# Patient Record
Sex: FEMALE | Race: WHITE | Employment: OTHER | URBAN - NONMETROPOLITAN AREA
[De-identification: names, ages, dates, MRNs, and addresses within clinical notes are randomized per-mention and may not be internally consistent; named-entity substitution may affect disease eponyms.]

---

## 2018-01-17 ENCOUNTER — NURSE TRIAGE (OUTPATIENT)
Dept: ADMINISTRATIVE | Age: 83
End: 2018-01-17

## 2021-11-19 ENCOUNTER — OFFICE VISIT (OUTPATIENT)
Dept: SURGERY | Age: 86
End: 2021-11-19
Payer: MEDICARE

## 2021-11-19 ENCOUNTER — TELEPHONE (OUTPATIENT)
Dept: SURGERY | Age: 86
End: 2021-11-19

## 2021-11-19 VITALS
BODY MASS INDEX: 23.92 KG/M2 | WEIGHT: 130 LBS | HEIGHT: 62 IN | SYSTOLIC BLOOD PRESSURE: 127 MMHG | HEART RATE: 75 BPM | DIASTOLIC BLOOD PRESSURE: 59 MMHG | OXYGEN SATURATION: 99 %

## 2021-11-19 DIAGNOSIS — K62.3 RECTAL PROLAPSE: Primary | ICD-10-CM

## 2021-11-19 DIAGNOSIS — F03.90 DEMENTIA WITHOUT BEHAVIORAL DISTURBANCE, UNSPECIFIED DEMENTIA TYPE: ICD-10-CM

## 2021-11-19 PROCEDURE — 4040F PNEUMOC VAC/ADMIN/RCVD: CPT | Performed by: SURGERY

## 2021-11-19 PROCEDURE — G8484 FLU IMMUNIZE NO ADMIN: HCPCS | Performed by: SURGERY

## 2021-11-19 PROCEDURE — G8420 CALC BMI NORM PARAMETERS: HCPCS | Performed by: SURGERY

## 2021-11-19 PROCEDURE — 1036F TOBACCO NON-USER: CPT | Performed by: SURGERY

## 2021-11-19 PROCEDURE — G8427 DOCREV CUR MEDS BY ELIG CLIN: HCPCS | Performed by: SURGERY

## 2021-11-19 PROCEDURE — 1123F ACP DISCUSS/DSCN MKR DOCD: CPT | Performed by: SURGERY

## 2021-11-19 PROCEDURE — 1090F PRES/ABSN URINE INCON ASSESS: CPT | Performed by: SURGERY

## 2021-11-19 PROCEDURE — 99205 OFFICE O/P NEW HI 60 MIN: CPT | Performed by: SURGERY

## 2021-11-19 RX ORDER — SODIUM CHLORIDE 0.9 % (FLUSH) 0.9 %
5-40 SYRINGE (ML) INJECTION EVERY 12 HOURS SCHEDULED
Status: CANCELLED | OUTPATIENT
Start: 2021-11-19

## 2021-11-19 RX ORDER — AMLODIPINE BESYLATE 5 MG/1
5 TABLET ORAL NIGHTLY
COMMUNITY
Start: 2021-11-15

## 2021-11-19 RX ORDER — ATORVASTATIN CALCIUM 20 MG/1
20 TABLET, FILM COATED ORAL NIGHTLY
COMMUNITY
Start: 2021-11-15

## 2021-11-19 RX ORDER — ACETAMINOPHEN 325 MG/1
1000 TABLET ORAL ONCE
Status: CANCELLED | OUTPATIENT
Start: 2021-11-19 | End: 2021-11-19

## 2021-11-19 RX ORDER — TRIAMTERENE AND HYDROCHLOROTHIAZIDE 37.5; 25 MG/1; MG/1
1 TABLET ORAL DAILY
COMMUNITY
Start: 2021-11-15

## 2021-11-19 RX ORDER — SODIUM CHLORIDE 9 MG/ML
25 INJECTION, SOLUTION INTRAVENOUS PRN
Status: CANCELLED | OUTPATIENT
Start: 2021-11-19

## 2021-11-19 RX ORDER — SODIUM CHLORIDE 0.9 % (FLUSH) 0.9 %
5-40 SYRINGE (ML) INJECTION PRN
Status: CANCELLED | OUTPATIENT
Start: 2021-11-19

## 2021-11-19 NOTE — TELEPHONE ENCOUNTER
Spoke with patient's son Hany Marquez, they have placed a hold on time at [facility] for surgery in January. She does have to see her PCP first for clearance, this will decide if they are going to have the surgery or not. Time held. Hany Marquez will call us.

## 2021-11-19 NOTE — PATIENT INSTRUCTIONS
RECTAL PROLAPSE INFORMATION    Rectal prolapse is a condition in which the rectum (the last part of the large intestine) loses the normal attachments that keep it fixed inside the body, allowing it to slide or telescope out through the anal opening, turning it inside out.  Rectal prolapse affects mostly adults, but women ages 48 and older have six times the risk as men. It can be embarrassing and often has a negative effect on a patients quality of life. Although not always required, the most effective treatment for rectal prolapse is surgery. CAUSES    While a number of factors are thought to be linked to rectal prolapse, there is no clear cut cause.  An estimated 30% to 67% of patients have chronic constipation (infrequent stools or severe straining) and an additional 15% have diarrhea. In the past, this condition was assumed to be linked to giving birth multiple times by vaginal delivery. However, as many as 35% of patients with rectal prolapse never gave birth, and it can occur in men. Occasionally, rectal prolapse can be caused by a polyp or mass that serves as the \"lead point\" of the bowel protrusion. SYMPTOMS    A common question is whether hemorrhoids and rectal prolapse are the same. Bleeding and/or tissue that protrudes from the rectum are common symptoms of both, but there is a major difference. Rectal prolapse involves an entire segment of the bowel located higher up within the body. Hemorrhoids only involve the inner layer of the bowel near the anal opening. Rectal prolapse can lead to fecal incontinence (not being able to fully control gas or bowel movements), bleeding, mucus discharge, and discomfort. DIAGNOSIS    During the first visit, your colon and rectal surgeon will perform a thorough medical history and anorectal exam. In some cases, a rectal prolapse may be \"hidden\" or internal, making diagnosis more difficult.  You may be asked to sit on a toilet at your physicians office and strain as if having a bowel movement. Other tests used for diagnosis include:    Video defecogram: X-rays are taken while you are having a bowel movement to test muscle movement. Anorectal Manometry: Evaluates muscle functions and reflexes of the pelvis, rectum and anus used during bowel movements. Colonoscopy or sigmoidoscopy: In addition to being helpful to regularly screen for colon and rectal cancer and polyps, your surgeon will want to make sure you have had a recent colonoscopy (full colon exam) or sigmoidoscopy (limited exam) to insure that the inside of the prolapsing segment is healthy. TREATMENT    Constipation and straining play a major role in worsening and causing this condition, so this must be addressed and corrected to optimize results after surgery. However, it is unlikely that the prolapse will \"fix itself\", and most patients require surgical correction. STOOL REGIMEN for constipation:    1) Eat a healthy diet with lots of fruits and vegetables. Exercise and be active. 2) Use a fiber supplement twice daily (Metamucil, Benefiber, Konsyl, Citrucel, generic brand, etc) per package instructions. - Women should aim for 25 grams of fiber daily.   - Men should aim for 30-35 grams of fiber daily. 3) Drink 6-8 glasses of water per day. 4) MiraLax can be used as needed daily to help lubricate stool. 5) Colace stool softeners can be used as needed as well. Avoid Senna and sennakot laxative products, as they may damage the colon with long-term use. Stools should be soft, mushy but formed consistency, not diarrhea, without the need to strain. Only a few minutes should be spent on the commode. SURGICAL TREATMENT OF RECTAL PROLAPSE:    There are several methods used to surgically repair rectal prolapse. Your colon and rectal surgeon will make the decision what surgery to use based on your age, physical condition, extent of prolapse and the results of tests.  Options include removing part of the rectum or pulling the rectum back up and anchoring it. Surgical approaches include:    Abdominal repair through traditional surgery (open approach)  Laparoscopic surgery  Robotically assisted surgery   Transperineal approach (incision through the rectum itself from below - no skin scars)    POST-TREATMENT PROGNOSIS    For a large majority of patients, surgery relieves or greatly improves symptoms. Prolapse or some other condition may have weakened the anal sphincter muscles. However, these muscles have the potential to regain strength after the prolapse has been corrected. Factors that influence outcome include:    Condition of the anal sphincter muscles before surgery  Whether the prolapse is internal or external  Overall health of the patient    It may take as long as one year to determine the impact of surgery on bowel function. Chronic constipation and straining after surgical correction should be avoided to avoid recurrence. WHAT IS A COLON AND RECTAL SURGEON? Colon and rectal surgeons are experts in the surgical and non-surgical treatment of diseases of the colon, rectum, and anus. They have completed advanced surgical training in the treatment of these diseases, as well as full general surgical training. They are well versed in the treatment of both benign and malignant diseases of the colon, rectum, and anus and are able to perform routine screening examinations and surgically treat conditions, if indicated to do so.     If you have any questions, please call Dr Jakob Kaba office at: [phone]

## 2021-11-19 NOTE — PROGRESS NOTES
[address] [facility]  [address] [facility]. [address]  [address] [place] [id number]  Dept: [phone]  Dept Fax: [phone]: [phone]    Visit Date: 11/19/2021    Karrie Lane is a 80 y.o. female who presents today for: New Patient (Rectal Prolapse)      HPI:       Karrie Lane is a 80 y.o. female referred to me for further evaluation regarding full-thickness rectal prolapse. Kinga Vu is here today with her  and her son. She states she has been having mucus and incontinence for almost 6 months. She does have a history of colonoscopy in the past.  Has had a surgical history of cholecystectomy about 3 to 4 years ago. States she has trouble with sensation and does have incontinence. Intermittent liquid and constipation. Patient's problem list, medications, past medical, surgical, family, and social histories were reviewed and updated in the chart as indicated today. Past Medical History:   Diagnosis Date    Hyperlipidemia     Hypertension        Past Surgical History:   Procedure Laterality Date    CHOLECYSTECTOMY      HERNIA REPAIR      HYSTERECTOMY, TOTAL ABDOMINAL         Cancer-related family history is not on file. Social History:   Social History     Tobacco Use    Smoking status: Never Smoker    Smokeless tobacco: Never Used   Substance Use Topics    Alcohol use: Yes      Tobacco cessation counseling provided as appropriate. REVIEW OF SYSTEMS:    Pertinent positives and negatives are mentioned in the HPI above. Otherwise, all other systems were reviewed and negative. Objective:     Physical Exam   BP (!) 127/59   Pulse 75   Ht 5' 2\" (1.575 m)   Wt 130 lb (59 kg)   SpO2 99%   BMI 23.78 kg/m²   Constitutional: Appears well-developed and well-nourished. Grooming appropriate. No gross deformities. Body mass index is 23.78 kg/m². Eyes: No scleral icterus. Conjunctiva/lids normal. Vision intact grossly. Pupils equal/symmetric, reactive bilaterally. ENT: External ears/nose without defect, scars, or masses. Hearing grossly intact. No facial deformity. Lips normal, normal dentition. Neck: No masses. Trachea midline. No crepitus. Thyroid not enlarged. Cardiovascular: Normal rate. No peripheral edema. Abdominal aorta normal size to palpation. Pulmonary/Chest: Effort normal. No respiratory distress. No wheezes. No use of accessory muscles. Musculoskeletal: Normal range of motion x all 4 extremities and head/neck, without deformity, pain, or crepitus, with normal strength and tone. Normal gait. Nails without clubbing or cyanosis. Neurological: Alert and oriented to person, place, and time. No gross deficits. Sensation intact. Skin: Skin is dry. No rashes noted. No pallor. No induration of nodules. Psychiatric: Normal mood and affect. Behavior normal. Oriented to person, place, and time. Judgment and insight reasonable. Abdominal/wound: Soft, nontender, nondistended    Anorectal exam with chaperone in room. Patient placed in left lateral position. Buttock spread. Stool staining seen around perianal skin. Digital rectal exam shows no masses. Patient having some difficulty lying on her side, but with straining some beginnings of prolapse noted. Patient brought over to the commode and examined with chaperone in room and angled mirror aimed at the perineum. Even without Valsalva, patient noted to have about a 7 cm long prolapse full-thickness of the rectum.     Labs reviewed: None  Radiology reviewed: None    Last colonoscopy: Over 10 years ago      Assessment/Plan:     A/P:  New problem(s): Full-thickness rectal prolapse  Established problem(s): Advanced age  Additional workup/treatment planned: Transperineal proctosigmoidectomy  Risk of complications/morbidity: High    I had a long discussion with Chencho Jimenez and her  and son regarding the pathophysiology, etiology, natural history, treatment options including surgical options for full-thickness rectal prolapse. Given her advanced age and her limited functional status, I think a transperineal approach would be better suited. Discussed Altmeyer proctosigmoidectomy with coloanal anastomosis. Discussed recurrence rates with this approach. Discussed other risks, such as bleeding, infection, leak, recurrence of prolapse. Discussed that this will not fix her incontinence issues per se, but this can be later addressed after the prolapse has been taken care of. They would like to proceed with surgical intervention as this is severely affecting her day-to-day life and quality of life. We will schedule in the coming weeks. I had a discussion with the patient, , and son regarding the risks, benefits, and alternatives of the procedure, including, but not limited to: bleeding, infection, anastomotic leak, poor wound healing or cosmetic result, hernia, bowel obstruction, fistula, need for reoperation or additional procedures, temporary or permanent ostomy, damage to other structures, such as bowel, bladder, ureter, stomach, liver, and neurovascular structures. Need for, and risks of general anesthesia discussed. Postoperative typical bowel function, urinary function, and sexual function were discussed. The likelihood of open operation was discussed as well. Prep, preoperative testing, typical hospital stay, and perioperative expectations were addressed, as well as typical expectations regarding pain control and time away from work and daily activities. All questions were answered to patient's satisfaction. They understand that even in technically successfully operations and even in healthy patients, complications can occur, including possibility of death. I provided and encouraged patients and family members to review AVS (after visit summary) information that I have provided, that contains even more information regarding surgical risks and complications. They were encouraged to reach out to my office if they have any questions before or after surgery. Patient understands higher risk of perioperative morbidity and mortality given: advanced age, previous abd surgeries     Continue with current medications    I provided written information in the After Visit Summary AVS Regarding: rectal prolapse    DISPOSITION:  Plan surgery soon    My findings will be relayed to consulting practitioner or PCP via Epic    Note completed using dictation software, please excuse any errors.     Electronically signed by Smith Holland MD on 11/19/2021 at 11:50 AM

## 2021-12-30 ENCOUNTER — TELEPHONE (OUTPATIENT)
Dept: SURGERY | Age: 86
End: 2021-12-30

## 2022-02-09 ENCOUNTER — NURSE TRIAGE (OUTPATIENT)
Dept: OTHER | Facility: CLINIC | Age: 87
End: 2022-02-09

## 2022-02-09 NOTE — TELEPHONE ENCOUNTER
Subjective: Caller (son, [place] mother has rectal prolapse and they recommended surgery. With her age we decided not to do that. Both of my parents are very forgetful. I went up to get her purse and noticed blood on their carpet. Dad said it was an accident and it was poop. She wears depends. Dad helped her change her pad and it was a lot of blood. The blood in the toilet is bright red. \" Son states the patient states her \"rectum is hanging out\"    Current Symptoms: rectal bleeding    Onset: this morning    Associated Symptoms: denies other symptoms    Pain Severity: denies pain    Temperature: denies fever    What has been tried: none    LMP: NA Pregnant: No    Recommended disposition: see in office today    Care advice provided, patient verbalizes understanding; denies any other questions or concerns; instructed to call back for any new or worsening symptoms. Patient/caller to follow-up with PCP     This triage is a result of a call to [address] [place] [address]. Please do not respond to the triage nurse through this encounter. Any subsequent communication should be directly with the patient.     Reason for Disposition   MODERATE rectal bleeding (small blood clots, passing blood without stool, or toilet water turns red)    Protocols used: RECTAL BLEEDING-ADULT-[place]

## 2022-02-11 ENCOUNTER — TELEPHONE (OUTPATIENT)
Dept: SURGERY | Age: 87
End: 2022-02-11

## 2022-02-11 ENCOUNTER — PREP FOR PROCEDURE (OUTPATIENT)
Dept: SURGERY | Age: 87
End: 2022-02-11

## 2022-02-11 RX ORDER — SODIUM CHLORIDE 0.9 % (FLUSH) 0.9 %
5-40 SYRINGE (ML) INJECTION PRN
Status: CANCELLED | OUTPATIENT
Start: 2022-02-11

## 2022-02-11 RX ORDER — ACETAMINOPHEN 325 MG/1
1000 TABLET ORAL ONCE
Status: CANCELLED | OUTPATIENT
Start: 2022-02-11 | End: 2022-02-11

## 2022-02-11 RX ORDER — NEOMYCIN SULFATE 500 MG/1
TABLET ORAL
Qty: 6 TABLET | Refills: 0 | Status: ON HOLD
Start: 2022-02-11 | End: 2022-02-20 | Stop reason: HOSPADM

## 2022-02-11 RX ORDER — METRONIDAZOLE 500 MG/1
TABLET ORAL
Qty: 3 TABLET | Refills: 0 | Status: ON HOLD
Start: 2022-02-11 | End: 2022-02-20 | Stop reason: HOSPADM

## 2022-02-11 RX ORDER — SODIUM CHLORIDE 9 MG/ML
25 INJECTION, SOLUTION INTRAVENOUS PRN
Status: CANCELLED | OUTPATIENT
Start: 2022-02-11

## 2022-02-11 RX ORDER — SODIUM CHLORIDE 0.9 % (FLUSH) 0.9 %
5-40 SYRINGE (ML) INJECTION EVERY 12 HOURS SCHEDULED
Status: CANCELLED | OUTPATIENT
Start: 2022-02-11

## 2022-02-11 NOTE — TELEPHONE ENCOUNTER
Patient has been scheduled for:    Procedure: carmelina Siddiqi sig  Date: 2/18/22  Time: 1:00pm  Arrival: 11:00AM  Hospital: [facility]    Covid: Vaccinated (will test on 2/12/22  ASA?: N/A  Prep? #2 reviewed with son, emailed, abx sent    Pre-op? PCP    Post-op Appt? [facility] 3/10/22 at 9:30AM    Orders faxed to surgery scheduling. Instructions have been emailed to:  [email or web address]

## 2022-02-11 NOTE — TELEPHONE ENCOUNTER
Pt's son, Ana Cristina Yap, called to inform pt is having trouble with her rectal prolapse. She is also having bright red bleeding. She went to the ER Wednesday. Blood count was down to 11.7 from 12.1 on Monday. Pt was prescribed hydrocortisone cream 2.5%. He is not sure what to do right now. Please call Ana Cristina Yap, [phone].

## 2022-02-11 NOTE — TELEPHONE ENCOUNTER
Spoke to her son extensively. Discussed that the only way to permanently fix her prolapse is surgery. Hydrocortisone cream will do nothing. Offered to speak to the PCP to discuss the situation. Rosa Menendez will talk to his parents and let us know if they would like to proceed with surgery.

## 2022-02-16 RX ORDER — CALCIUM CARBONATE 500(1250)
400 TABLET ORAL 2 TIMES DAILY
COMMUNITY

## 2022-02-16 NOTE — PROGRESS NOTES
[facility] [facility] time____1125________        Surgery time____1325________    Take the following medications with a sip of water: Follow your MD/Surgeons pre-procedure instructions regarding your medications    Do not eat or drink anything after 12:00 midnight prior to your surgery. This includes water chewing gum, mints and ice chips. You may brush your teeth and gargle the morning of your surgery, but do not swallow the water     Please see your family doctor/pediatrician for a history and physical and/or concerning medications. Bring any test results/reports from your physicians office. If you are under the care of a heart doctor or specialist doctor, please be aware that you may be asked to them for clearance    You may be asked to stop blood thinners such as Coumadin, Plavix, Fragmin, Lovenox, etc., or any anti-inflammatories such as:  Aspirin, Ibuprofen, Advil, Naproxen prior to your surgery. We also ask that you stop any OTC medications such as fish oil, vitamin E, glucosamine, garlic, Multivitamins, COQ 10, etc.    We ask that you do not smoke 24 hours prior to surgery  We ask that you do not  drink any alcoholic beverages 24 hours prior to surgery     You must make arrangements for a responsible adult to take you home after your surgery. For your safety you will not be allowed to leave alone or drive yourself home. Your surgery will be cancelled if you do not have a ride home. Also for your safety, it is strongly suggested that someone stay with you the first 24 hours after your surgery. A parent or legal guardian must accompany a child scheduled for surgery and plan to stay at the hospital until the child is discharged. Please do not bring other children with you. For your comfort, please wear simple loose fitting clothing to the hospital.  Please do not bring valuables.     Do not wear any make-up or nail polish on your fingers or toes      For your safety, please do not wear any jewelry or body piercing's on the day of surgery. All jewelry must be removed. If you have dentures, they will be removed before going to operating room. For your convenience, we will provide you with a container. If you wear contact lenses or glasses, they will be removed, please bring a case for them. If you have a living will and a durable power of  for healthcare, please bring in a copy. As part of our patient safety program to minimize surgical site infections, we ask you to do the following:    · Please notify your surgeon if you develop any illness between         now and the  day of your surgery. · This includes a cough, cold, fever, sore throat, nausea,         or vomiting, and diarrhea, etc.  ·  Please notify your surgeon if you experience dizziness, shortness         of breath or blurred vision between now and the time of your surgery. Do not shave your operative site 96 hours prior to surgery. For face and neck surgery, men may use an electric razor 48 hours   prior to surgery. You may shower the night before surgery or the morning of   your surgery with an antibacterial soap. You will need to bring a photo ID and insurance card    [facility] has an onsite pharmacy, would you like to utilize our pharmacy     If you will be staying overnight and use a C-pap machine, please bring   your C-pap to hospital     Our goal is to provide you with excellent care, therefore, visitors will be limited to two(2) in the room at a time so that we may focus on providing this care for you. Please contact pre-admission testing if you have any further questions. [facility] phone number:  [phone] PAT fax number:  [phone]  Please note these are generalized instructions for all surgical cases, you may be provided with more specific instructions according to your surgery.

## 2022-02-16 NOTE — PROGRESS NOTES
C-Difficile admission screening and protocol:     * Admitted with diarrhea? YES____    NO____X_     *Prior history of C-Diff. In last 3 months? YES____   NO__X__     *Antibiotic use in the past 6-8 weeks? NO___X___YES______                 If yes which  ANTIBIOTIC AND REASON______     *Prior hospitalization or nursing home in the last month?  YES____   NO_X___

## 2022-02-16 NOTE — PROGRESS NOTES
Covid testing done on 2/15/22 @ [facility] in [place]  If positive---Pt instructed to notify MD ASAP   If negative--pt was instructed to bring results DOP/DOS

## 2022-02-17 ENCOUNTER — ANESTHESIA EVENT (OUTPATIENT)
Dept: OPERATING ROOM | Age: 87
DRG: 334 | End: 2022-02-17
Payer: MEDICARE

## 2022-02-17 ENCOUNTER — TELEPHONE (OUTPATIENT)
Dept: SURGERY | Age: 87
End: 2022-02-17

## 2022-02-17 NOTE — TELEPHONE ENCOUNTER
I have placed a reminder call to patient for upcoming procedure. Did you speak directly to patient or leave a voicemail? Spoke to son Cary Fernandes    Prep?      NPO 12AM  Prep #2    Will be admitted    Arrive at the main entrance of [facility] at 11:00AM

## 2022-02-18 ENCOUNTER — HOSPITAL ENCOUNTER (INPATIENT)
Age: 87
LOS: 3 days | Discharge: HOME HEALTH | DRG: 334 | End: 2022-02-21
Attending: SURGERY | Admitting: SURGERY
Payer: MEDICARE

## 2022-02-18 ENCOUNTER — ANESTHESIA (OUTPATIENT)
Dept: OPERATING ROOM | Age: 87
DRG: 334 | End: 2022-02-18
Payer: MEDICARE

## 2022-02-18 VITALS
SYSTOLIC BLOOD PRESSURE: 120 MMHG | TEMPERATURE: 96.1 F | RESPIRATION RATE: 9 BRPM | DIASTOLIC BLOOD PRESSURE: 60 MMHG | OXYGEN SATURATION: 98 %

## 2022-02-18 DIAGNOSIS — K62.3 RECTAL PROLAPSE: ICD-10-CM

## 2022-02-18 LAB
ABO/RH: NORMAL
ANION GAP SERPL CALCULATED.3IONS-SCNC: 19 MMOL/L (ref 3–16)
ANTIBODY SCREEN: NORMAL
BUN BLDV-MCNC: 18 MG/DL (ref 7–20)
CALCIUM SERPL-MCNC: 9.6 MG/DL (ref 8.3–10.6)
CHLORIDE BLD-SCNC: 95 MMOL/L (ref 99–110)
CO2: 20 MMOL/L (ref 21–32)
CREAT SERPL-MCNC: 0.8 MG/DL (ref 0.6–1.2)
GFR AFRICAN AMERICAN: >60
GFR NON-AFRICAN AMERICAN: >60
GLUCOSE BLD-MCNC: 111 MG/DL (ref 70–99)
HCT VFR BLD CALC: 38.9 % (ref 36–48)
HEMOGLOBIN: 13.1 G/DL (ref 12–16)
MAGNESIUM: 1.8 MG/DL (ref 1.8–2.4)
MCH RBC QN AUTO: 27.7 PG (ref 26–34)
MCHC RBC AUTO-ENTMCNC: 33.7 G/DL (ref 31–36)
MCV RBC AUTO: 82.3 FL (ref 80–100)
PDW BLD-RTO: 14.6 % (ref 12.4–15.4)
PLATELET # BLD: 262 K/UL (ref 135–450)
PMV BLD AUTO: 9.8 FL (ref 5–10.5)
POTASSIUM REFLEX MAGNESIUM: 3.3 MMOL/L (ref 3.5–5.1)
RBC # BLD: 4.72 M/UL (ref 4–5.2)
SODIUM BLD-SCNC: 134 MMOL/L (ref 136–145)
WBC # BLD: 8.4 K/UL (ref 4–11)

## 2022-02-18 PROCEDURE — 2500000003 HC RX 250 WO HCPCS: Performed by: ANESTHESIOLOGY

## 2022-02-18 PROCEDURE — 3700000000 HC ANESTHESIA ATTENDED CARE: Performed by: SURGERY

## 2022-02-18 PROCEDURE — 0DBN7ZZ EXCISION OF SIGMOID COLON, VIA NATURAL OR ARTIFICIAL OPENING: ICD-10-PCS | Performed by: SURGERY

## 2022-02-18 PROCEDURE — 83735 ASSAY OF MAGNESIUM: CPT

## 2022-02-18 PROCEDURE — 1200000000 HC SEMI PRIVATE

## 2022-02-18 PROCEDURE — A4217 STERILE WATER/SALINE, 500 ML: HCPCS | Performed by: SURGERY

## 2022-02-18 PROCEDURE — 6360000002 HC RX W HCPCS: Performed by: SURGERY

## 2022-02-18 PROCEDURE — 94761 N-INVAS EAR/PLS OXIMETRY MLT: CPT

## 2022-02-18 PROCEDURE — 2709999900 HC NON-CHARGEABLE SUPPLY: Performed by: SURGERY

## 2022-02-18 PROCEDURE — 2580000003 HC RX 258: Performed by: ANESTHESIOLOGY

## 2022-02-18 PROCEDURE — 2580000003 HC RX 258: Performed by: SURGERY

## 2022-02-18 PROCEDURE — 3700000001 HC ADD 15 MINUTES (ANESTHESIA): Performed by: SURGERY

## 2022-02-18 PROCEDURE — 6370000000 HC RX 637 (ALT 250 FOR IP): Performed by: SURGERY

## 2022-02-18 PROCEDURE — 7100000001 HC PACU RECOVERY - ADDTL 15 MIN: Performed by: SURGERY

## 2022-02-18 PROCEDURE — 0DJD7ZZ INSPECTION OF LOWER INTESTINAL TRACT, VIA NATURAL OR ARTIFICIAL OPENING: ICD-10-PCS | Performed by: SURGERY

## 2022-02-18 PROCEDURE — 2500000003 HC RX 250 WO HCPCS: Performed by: SURGERY

## 2022-02-18 PROCEDURE — 86900 BLOOD TYPING SEROLOGIC ABO: CPT

## 2022-02-18 PROCEDURE — 3600000014 HC SURGERY LEVEL 4 ADDTL 15MIN: Performed by: SURGERY

## 2022-02-18 PROCEDURE — 85027 COMPLETE CBC AUTOMATED: CPT

## 2022-02-18 PROCEDURE — 80048 BASIC METABOLIC PNL TOTAL CA: CPT

## 2022-02-18 PROCEDURE — 7100000000 HC PACU RECOVERY - FIRST 15 MIN: Performed by: SURGERY

## 2022-02-18 PROCEDURE — 2700000000 HC OXYGEN THERAPY PER DAY

## 2022-02-18 PROCEDURE — C1887 CATHETER, GUIDING: HCPCS | Performed by: SURGERY

## 2022-02-18 PROCEDURE — 0DTP7ZZ RESECTION OF RECTUM, VIA NATURAL OR ARTIFICIAL OPENING: ICD-10-PCS | Performed by: SURGERY

## 2022-02-18 PROCEDURE — 86901 BLOOD TYPING SEROLOGIC RH(D): CPT

## 2022-02-18 PROCEDURE — 86850 RBC ANTIBODY SCREEN: CPT

## 2022-02-18 PROCEDURE — 88307 TISSUE EXAM BY PATHOLOGIST: CPT

## 2022-02-18 PROCEDURE — 3600000004 HC SURGERY LEVEL 4 BASE: Performed by: SURGERY

## 2022-02-18 PROCEDURE — 45130 EXCISION OF RECTAL PROLAPSE: CPT | Performed by: SURGERY

## 2022-02-18 PROCEDURE — 6360000002 HC RX W HCPCS: Performed by: ANESTHESIOLOGY

## 2022-02-18 PROCEDURE — C9290 INJ, BUPIVACAINE LIPOSOME: HCPCS | Performed by: SURGERY

## 2022-02-18 RX ORDER — SODIUM CHLORIDE 9 MG/ML
25 INJECTION, SOLUTION INTRAVENOUS PRN
Status: DISCONTINUED | OUTPATIENT
Start: 2022-02-18 | End: 2022-02-18 | Stop reason: HOSPADM

## 2022-02-18 RX ORDER — ONDANSETRON 2 MG/ML
4 INJECTION INTRAMUSCULAR; INTRAVENOUS
Status: DISCONTINUED | OUTPATIENT
Start: 2022-02-18 | End: 2022-02-18 | Stop reason: HOSPADM

## 2022-02-18 RX ORDER — ONDANSETRON 2 MG/ML
INJECTION INTRAMUSCULAR; INTRAVENOUS PRN
Status: DISCONTINUED | OUTPATIENT
Start: 2022-02-18 | End: 2022-02-18 | Stop reason: SDUPTHER

## 2022-02-18 RX ORDER — POTASSIUM CHLORIDE 20 MEQ/1
40 TABLET, EXTENDED RELEASE ORAL PRN
Status: DISCONTINUED | OUTPATIENT
Start: 2022-02-18 | End: 2022-02-21 | Stop reason: HOSPADM

## 2022-02-18 RX ORDER — LEVOFLOXACIN 5 MG/ML
500 INJECTION, SOLUTION INTRAVENOUS
Status: DISCONTINUED | OUTPATIENT
Start: 2022-02-18 | End: 2022-02-18 | Stop reason: SDUPTHER

## 2022-02-18 RX ORDER — LIDOCAINE HYDROCHLORIDE 20 MG/ML
INJECTION, SOLUTION EPIDURAL; INFILTRATION; INTRACAUDAL; PERINEURAL PRN
Status: DISCONTINUED | OUTPATIENT
Start: 2022-02-18 | End: 2022-02-18 | Stop reason: SDUPTHER

## 2022-02-18 RX ORDER — TRIAMTERENE AND HYDROCHLOROTHIAZIDE 37.5; 25 MG/1; MG/1
1 TABLET ORAL DAILY
Status: DISCONTINUED | OUTPATIENT
Start: 2022-02-19 | End: 2022-02-21 | Stop reason: HOSPADM

## 2022-02-18 RX ORDER — ACETAMINOPHEN 500 MG
1000 TABLET ORAL ONCE
Status: COMPLETED | OUTPATIENT
Start: 2022-02-18 | End: 2022-02-18

## 2022-02-18 RX ORDER — FENTANYL CITRATE 50 UG/ML
25 INJECTION, SOLUTION INTRAMUSCULAR; INTRAVENOUS EVERY 5 MIN PRN
Status: DISCONTINUED | OUTPATIENT
Start: 2022-02-18 | End: 2022-02-18 | Stop reason: HOSPADM

## 2022-02-18 RX ORDER — SODIUM CHLORIDE 0.9 % (FLUSH) 0.9 %
10 SYRINGE (ML) INJECTION EVERY 12 HOURS SCHEDULED
Status: DISCONTINUED | OUTPATIENT
Start: 2022-02-18 | End: 2022-02-18 | Stop reason: HOSPADM

## 2022-02-18 RX ORDER — ONDANSETRON 4 MG/1
4 TABLET, ORALLY DISINTEGRATING ORAL EVERY 8 HOURS PRN
Status: DISCONTINUED | OUTPATIENT
Start: 2022-02-18 | End: 2022-02-21 | Stop reason: HOSPADM

## 2022-02-18 RX ORDER — SODIUM CHLORIDE 0.9 % (FLUSH) 0.9 %
5-40 SYRINGE (ML) INJECTION EVERY 12 HOURS SCHEDULED
Status: DISCONTINUED | OUTPATIENT
Start: 2022-02-18 | End: 2022-02-18 | Stop reason: SDUPTHER

## 2022-02-18 RX ORDER — ATORVASTATIN CALCIUM 20 MG/1
20 TABLET, FILM COATED ORAL NIGHTLY
Status: DISCONTINUED | OUTPATIENT
Start: 2022-02-18 | End: 2022-02-21 | Stop reason: HOSPADM

## 2022-02-18 RX ORDER — DEXTROSE AND SODIUM CHLORIDE 5; .45 G/100ML; G/100ML
INJECTION, SOLUTION INTRAVENOUS CONTINUOUS
Status: DISCONTINUED | OUTPATIENT
Start: 2022-02-18 | End: 2022-02-21 | Stop reason: HOSPADM

## 2022-02-18 RX ORDER — EPHEDRINE SULFATE/0.9% NACL/PF 50 MG/5 ML
SYRINGE (ML) INTRAVENOUS PRN
Status: DISCONTINUED | OUTPATIENT
Start: 2022-02-18 | End: 2022-02-18 | Stop reason: SDUPTHER

## 2022-02-18 RX ORDER — SODIUM CHLORIDE 9 MG/ML
25 INJECTION, SOLUTION INTRAVENOUS PRN
Status: DISCONTINUED | OUTPATIENT
Start: 2022-02-18 | End: 2022-02-21 | Stop reason: HOSPADM

## 2022-02-18 RX ORDER — OXYCODONE HYDROCHLORIDE 5 MG/1
5 TABLET ORAL
Status: DISCONTINUED | OUTPATIENT
Start: 2022-02-18 | End: 2022-02-18 | Stop reason: HOSPADM

## 2022-02-18 RX ORDER — ULTRASOUND COUPLING MEDIUM
GEL (GRAM) TOPICAL
Status: COMPLETED | OUTPATIENT
Start: 2022-02-18 | End: 2022-02-18

## 2022-02-18 RX ORDER — SODIUM CHLORIDE 0.9 % (FLUSH) 0.9 %
10 SYRINGE (ML) INJECTION EVERY 12 HOURS SCHEDULED
Status: DISCONTINUED | OUTPATIENT
Start: 2022-02-18 | End: 2022-02-21 | Stop reason: HOSPADM

## 2022-02-18 RX ORDER — AMLODIPINE BESYLATE 5 MG/1
5 TABLET ORAL NIGHTLY
Status: DISCONTINUED | OUTPATIENT
Start: 2022-02-18 | End: 2022-02-21 | Stop reason: HOSPADM

## 2022-02-18 RX ORDER — ACETAMINOPHEN 325 MG/1
650 TABLET ORAL ONCE
Status: DISCONTINUED | OUTPATIENT
Start: 2022-02-18 | End: 2022-02-18 | Stop reason: SDUPTHER

## 2022-02-18 RX ORDER — DROPERIDOL 2.5 MG/ML
0.62 INJECTION, SOLUTION INTRAMUSCULAR; INTRAVENOUS
Status: DISCONTINUED | OUTPATIENT
Start: 2022-02-18 | End: 2022-02-18 | Stop reason: HOSPADM

## 2022-02-18 RX ORDER — SODIUM CHLORIDE 0.9 % (FLUSH) 0.9 %
5-40 SYRINGE (ML) INJECTION EVERY 12 HOURS SCHEDULED
Status: DISCONTINUED | OUTPATIENT
Start: 2022-02-18 | End: 2022-02-18 | Stop reason: HOSPADM

## 2022-02-18 RX ORDER — SODIUM CHLORIDE 0.9 % (FLUSH) 0.9 %
10 SYRINGE (ML) INJECTION PRN
Status: DISCONTINUED | OUTPATIENT
Start: 2022-02-18 | End: 2022-02-18 | Stop reason: HOSPADM

## 2022-02-18 RX ORDER — ACETAMINOPHEN 325 MG/1
325 TABLET ORAL EVERY 6 HOURS PRN
Status: DISCONTINUED | OUTPATIENT
Start: 2022-02-18 | End: 2022-02-21 | Stop reason: HOSPADM

## 2022-02-18 RX ORDER — MAGNESIUM HYDROXIDE 1200 MG/15ML
LIQUID ORAL CONTINUOUS PRN
Status: COMPLETED | OUTPATIENT
Start: 2022-02-18 | End: 2022-02-18

## 2022-02-18 RX ORDER — SODIUM CHLORIDE 0.9 % (FLUSH) 0.9 %
5-40 SYRINGE (ML) INJECTION PRN
Status: DISCONTINUED | OUTPATIENT
Start: 2022-02-18 | End: 2022-02-18 | Stop reason: SDUPTHER

## 2022-02-18 RX ORDER — ACETAMINOPHEN 500 MG
1000 TABLET ORAL ONCE
Status: DISCONTINUED | OUTPATIENT
Start: 2022-02-18 | End: 2022-02-18 | Stop reason: SDUPTHER

## 2022-02-18 RX ORDER — SODIUM CHLORIDE 0.9 % (FLUSH) 0.9 %
10 SYRINGE (ML) INJECTION PRN
Status: DISCONTINUED | OUTPATIENT
Start: 2022-02-18 | End: 2022-02-21 | Stop reason: HOSPADM

## 2022-02-18 RX ORDER — FENTANYL CITRATE 50 UG/ML
INJECTION, SOLUTION INTRAMUSCULAR; INTRAVENOUS PRN
Status: DISCONTINUED | OUTPATIENT
Start: 2022-02-18 | End: 2022-02-18 | Stop reason: SDUPTHER

## 2022-02-18 RX ORDER — MEPERIDINE HYDROCHLORIDE 25 MG/ML
12.5 INJECTION INTRAMUSCULAR; INTRAVENOUS; SUBCUTANEOUS EVERY 5 MIN PRN
Status: DISCONTINUED | OUTPATIENT
Start: 2022-02-18 | End: 2022-02-18 | Stop reason: HOSPADM

## 2022-02-18 RX ORDER — LEVOFLOXACIN 5 MG/ML
500 INJECTION, SOLUTION INTRAVENOUS
Status: COMPLETED | OUTPATIENT
Start: 2022-02-18 | End: 2022-02-18

## 2022-02-18 RX ORDER — PROPOFOL 10 MG/ML
INJECTION, EMULSION INTRAVENOUS PRN
Status: DISCONTINUED | OUTPATIENT
Start: 2022-02-18 | End: 2022-02-18 | Stop reason: SDUPTHER

## 2022-02-18 RX ORDER — ONDANSETRON 2 MG/ML
4 INJECTION INTRAMUSCULAR; INTRAVENOUS EVERY 6 HOURS PRN
Status: DISCONTINUED | OUTPATIENT
Start: 2022-02-18 | End: 2022-02-21 | Stop reason: HOSPADM

## 2022-02-18 RX ORDER — SODIUM CHLORIDE 9 MG/ML
INJECTION, SOLUTION INTRAVENOUS CONTINUOUS PRN
Status: DISCONTINUED | OUTPATIENT
Start: 2022-02-18 | End: 2022-02-18 | Stop reason: SDUPTHER

## 2022-02-18 RX ORDER — POTASSIUM CHLORIDE 7.45 MG/ML
10 INJECTION INTRAVENOUS PRN
Status: DISCONTINUED | OUTPATIENT
Start: 2022-02-18 | End: 2022-02-21 | Stop reason: HOSPADM

## 2022-02-18 RX ORDER — ROCURONIUM BROMIDE 10 MG/ML
INJECTION, SOLUTION INTRAVENOUS PRN
Status: DISCONTINUED | OUTPATIENT
Start: 2022-02-18 | End: 2022-02-18 | Stop reason: SDUPTHER

## 2022-02-18 RX ORDER — FENTANYL CITRATE 50 UG/ML
50 INJECTION, SOLUTION INTRAMUSCULAR; INTRAVENOUS EVERY 5 MIN PRN
Status: DISCONTINUED | OUTPATIENT
Start: 2022-02-18 | End: 2022-02-18 | Stop reason: HOSPADM

## 2022-02-18 RX ORDER — SODIUM CHLORIDE 9 MG/ML
25 INJECTION, SOLUTION INTRAVENOUS PRN
Status: DISCONTINUED | OUTPATIENT
Start: 2022-02-18 | End: 2022-02-18 | Stop reason: SDUPTHER

## 2022-02-18 RX ORDER — LIDOCAINE 4 G/G
1 PATCH TOPICAL EVERY 12 HOURS
Status: DISCONTINUED | OUTPATIENT
Start: 2022-02-18 | End: 2022-02-21 | Stop reason: HOSPADM

## 2022-02-18 RX ORDER — OXYCODONE HYDROCHLORIDE 5 MG/1
5 TABLET ORAL EVERY 6 HOURS PRN
Status: DISCONTINUED | OUTPATIENT
Start: 2022-02-18 | End: 2022-02-21 | Stop reason: HOSPADM

## 2022-02-18 RX ORDER — DEXAMETHASONE SODIUM PHOSPHATE 4 MG/ML
INJECTION, SOLUTION INTRA-ARTICULAR; INTRALESIONAL; INTRAMUSCULAR; INTRAVENOUS; SOFT TISSUE PRN
Status: DISCONTINUED | OUTPATIENT
Start: 2022-02-18 | End: 2022-02-18 | Stop reason: SDUPTHER

## 2022-02-18 RX ORDER — MAGNESIUM SULFATE 1 G/100ML
1000 INJECTION INTRAVENOUS PRN
Status: DISCONTINUED | OUTPATIENT
Start: 2022-02-18 | End: 2022-02-21 | Stop reason: HOSPADM

## 2022-02-18 RX ORDER — SODIUM CHLORIDE 0.9 % (FLUSH) 0.9 %
5-40 SYRINGE (ML) INJECTION PRN
Status: DISCONTINUED | OUTPATIENT
Start: 2022-02-18 | End: 2022-02-18 | Stop reason: HOSPADM

## 2022-02-18 RX ORDER — SODIUM CHLORIDE 9 MG/ML
INJECTION, SOLUTION INTRAVENOUS CONTINUOUS
Status: DISCONTINUED | OUTPATIENT
Start: 2022-02-18 | End: 2022-02-18

## 2022-02-18 RX ADMIN — ATORVASTATIN CALCIUM 20 MG: 20 TABLET, FILM COATED ORAL at 20:11

## 2022-02-18 RX ADMIN — METRONIDAZOLE 500 MG: 500 INJECTION, SOLUTION INTRAVENOUS at 13:16

## 2022-02-18 RX ADMIN — LIDOCAINE HYDROCHLORIDE 40 MG: 20 INJECTION, SOLUTION EPIDURAL; INFILTRATION; INTRACAUDAL; PERINEURAL at 13:59

## 2022-02-18 RX ADMIN — SUGAMMADEX 200 MG: 100 INJECTION, SOLUTION INTRAVENOUS at 14:40

## 2022-02-18 RX ADMIN — DEXTROSE AND SODIUM CHLORIDE: 5; 450 INJECTION, SOLUTION INTRAVENOUS at 18:48

## 2022-02-18 RX ADMIN — DEXAMETHASONE SODIUM PHOSPHATE 8 MG: 4 INJECTION, SOLUTION INTRAMUSCULAR; INTRAVENOUS at 13:23

## 2022-02-18 RX ADMIN — LIDOCAINE HYDROCHLORIDE 40 MG: 20 INJECTION, SOLUTION EPIDURAL; INFILTRATION; INTRACAUDAL; PERINEURAL at 13:23

## 2022-02-18 RX ADMIN — FENTANYL CITRATE 50 MCG: 50 INJECTION INTRAMUSCULAR; INTRAVENOUS at 13:47

## 2022-02-18 RX ADMIN — SODIUM CHLORIDE: 9 INJECTION, SOLUTION INTRAVENOUS at 12:08

## 2022-02-18 RX ADMIN — ROCURONIUM BROMIDE 50 MG: 10 SOLUTION INTRAVENOUS at 13:25

## 2022-02-18 RX ADMIN — ROCURONIUM BROMIDE 10 MG: 10 SOLUTION INTRAVENOUS at 14:12

## 2022-02-18 RX ADMIN — PROPOFOL 50 MG: 10 INJECTION, EMULSION INTRAVENOUS at 13:23

## 2022-02-18 RX ADMIN — Medication 10 MG: at 13:35

## 2022-02-18 RX ADMIN — SODIUM CHLORIDE: 9 INJECTION, SOLUTION INTRAVENOUS at 13:16

## 2022-02-18 RX ADMIN — ENOXAPARIN SODIUM 30 MG: 30 INJECTION SUBCUTANEOUS at 12:17

## 2022-02-18 RX ADMIN — METRONIDAZOLE 500 MG: 500 INJECTION, SOLUTION INTRAVENOUS at 12:15

## 2022-02-18 RX ADMIN — LEVOFLOXACIN 500 MG: 5 INJECTION, SOLUTION INTRAVENOUS at 13:25

## 2022-02-18 RX ADMIN — FENTANYL CITRATE 50 MCG: 50 INJECTION INTRAMUSCULAR; INTRAVENOUS at 14:19

## 2022-02-18 RX ADMIN — ONDANSETRON 4 MG: 2 INJECTION INTRAMUSCULAR; INTRAVENOUS at 14:26

## 2022-02-18 RX ADMIN — ACETAMINOPHEN 1000 MG: 500 TABLET ORAL at 12:13

## 2022-02-18 ASSESSMENT — PULMONARY FUNCTION TESTS
PIF_VALUE: 12
PIF_VALUE: 1
PIF_VALUE: 12
PIF_VALUE: 7
PIF_VALUE: 12
PIF_VALUE: 11
PIF_VALUE: 12
PIF_VALUE: 14
PIF_VALUE: 12
PIF_VALUE: 1
PIF_VALUE: 1
PIF_VALUE: 12
PIF_VALUE: 6
PIF_VALUE: 12
PIF_VALUE: 3
PIF_VALUE: 12
PIF_VALUE: 15
PIF_VALUE: 11
PIF_VALUE: 12
PIF_VALUE: 1
PIF_VALUE: 12
PIF_VALUE: 5
PIF_VALUE: 12
PIF_VALUE: 3
PIF_VALUE: 12
PIF_VALUE: 4
PIF_VALUE: 12
PIF_VALUE: 11
PIF_VALUE: 4
PIF_VALUE: 12
PIF_VALUE: 16
PIF_VALUE: 12
PIF_VALUE: 3
PIF_VALUE: 12
PIF_VALUE: 23
PIF_VALUE: 12
PIF_VALUE: 4
PIF_VALUE: 12
PIF_VALUE: 14
PIF_VALUE: 12
PIF_VALUE: 12
PIF_VALUE: 0
PIF_VALUE: 1
PIF_VALUE: 12
PIF_VALUE: 2

## 2022-02-18 ASSESSMENT — PAIN SCALES - GENERAL
PAINLEVEL_OUTOF10: 0

## 2022-02-18 ASSESSMENT — PAIN - FUNCTIONAL ASSESSMENT: PAIN_FUNCTIONAL_ASSESSMENT: 0-10

## 2022-02-18 ASSESSMENT — ENCOUNTER SYMPTOMS: SHORTNESS OF BREATH: 0

## 2022-02-18 NOTE — ANESTHESIA PRE PROCEDURE
Department of Anesthesiology  Preprocedure Note       Name:  Una Lebron   Age:  80 y.o.  :  1933                                          MRN:  [id number]         Date:  2022      Surgeon: Otoniel Israel):  Renato Flores MD    Procedure: Procedure(s):  TRANSPERINEAL RECTOSIGMOIDECTOMY (ALTEMEIER PROCEDURE), FLEXIBLE SIGMOIDOSCOPY    Medications prior to admission:   Prior to Admission medications    Medication Sig Start Date End Date Taking? Authorizing Provider   calcium carbonate (OSCAL) 500 MG TABS tablet Take 400 mg by mouth 2 times daily   Yes Historical Provider, MD   atorvastatin (LIPITOR) 20 MG tablet Take 20 mg by mouth at bedtime  11/15/21  Yes Historical Provider, MD   triamterene-hydroCHLOROthiazide (MAXZIDE-25) 37.5-25 MG per tablet Take 1 tablet by mouth daily  11/15/21  Yes Historical Provider, MD   amLODIPine (NORVASC) 5 MG tablet Take 5 mg by mouth nightly  11/15/21  Yes Historical Provider, MD   metroNIDAZOLE (FLAGYL) 500 MG tablet Take one tablet by mouth 3 times on the day prior to surgery. Take one tablet at 1pm, 2pm and 9pm. 22   Renato Flores MD   neomycin (MYCIFRADIN) 500 MG tablet Take two tablets 3 times the day before surgery.  Take two tablets at 1pm, two tablets at 2pm and two tablets at 9pm. 22   Renato Flores MD       Current medications:    Current Facility-Administered Medications   Medication Dose Route Frequency Provider Last Rate Last Admin    0.9 % sodium chloride infusion   IntraVENous Continuous Lucia Ortega MD        sodium chloride flush 0.9 % injection 10 mL  10 mL IntraVENous 2 times per day Lucia Ortega MD        sodium chloride flush 0.9 % injection 10 mL  10 mL IntraVENous PRN Lucia Ortega MD        0.9 % sodium chloride infusion  25 mL IntraVENous PRN Lucia Ortega MD        acetaminophen (TYLENOL) tablet 1,000 mg  1,000 mg Oral Once Renato Flores MD        enoxaparin (LOVENOX) injection 30 mg  30 mg SubCUTAneous Once Renato Flores MD  metronidazole (FLAGYL) 500 mg in NaCl 100 mL IVPB premix  500 mg IntraVENous On Call to Via Trever Lerma MD        And    levoFLOXacin (LEVAQUIN) 500 MG/100ML infusion 500 mg  500 mg IntraVENous On Call to Via Trever Lerma MD           Allergies:  No Known Allergies    Problem List:  There is no problem list on file for this patient. Past Medical History:        Diagnosis Date    Arthritis     bilateral knee    Forgetfulness     Hx of blood clots 2020    DVT    Hyperlipidemia     Hypertension     Wears glasses     reading       Past Surgical History:        Procedure Laterality Date    CHOLECYSTECTOMY      HERNIA REPAIR      hiatal hernia    HYSTERECTOMY, TOTAL ABDOMINAL      complete       Social History:    Social History     Tobacco Use    Smoking status: Never Smoker    Smokeless tobacco: Never Used   Substance Use Topics    Alcohol use: Yes     Comment: socially--wine                                Counseling given: Not Answered      Vital Signs (Current):   Vitals:    02/16/22 1252   Weight: 130 lb (59 kg)   Height: 5' (1.524 m)                                              BP Readings from Last 3 Encounters:   11/19/21 (!) 127/59       NPO Status:                                                                                 BMI:   Wt Readings from Last 3 Encounters:   02/16/22 130 lb (59 kg)   11/19/21 130 lb (59 kg)     Body mass index is 25.39 kg/m². CBC: No results found for: WBC, RBC, HGB, HCT, MCV, RDW, PLT    CMP: No results found for: NA, K, CL, CO2, BUN, CREATININE, GFRAA, AGRATIO, LABGLOM, GLUCOSE, GLU, PROT, CALCIUM, BILITOT, ALKPHOS, AST, ALT    POC Tests: No results for input(s): POCGLU, POCNA, POCK, POCCL, POCBUN, POCHEMO, POCHCT in the last 72 hours.     Coags: No results found for: PROTIME, INR, APTT    HCG (If Applicable): No results found for: PREGTESTUR, PREGSERUM, HCG, HCGQUANT     ABGs: No results found for: PHART, PO2ART, MPK4WFR, GKH8OFQ, BEART, X8GVLCJJ Type & Screen (If Applicable):  No results found for: LABABO, LABRH    Drug/Infectious Status (If Applicable):  No results found for: HIV, HEPCAB    COVID-19 Screening (If Applicable): No results found for: COVID19        Anesthesia Evaluation  Patient summary reviewed no history of anesthetic complications:   Airway: Mallampati: II  TM distance: >3 FB   Neck ROM: full  Comment: Prior airway in 2017:  :Standard; ETT Size:7 mm; Cuffed:Cuffed; Direct Laryngoscopy attempts:1; Laryngoscope type:Jerry; Laryngoscope size:2; Grade view:II; Rapid sequence:No; Cricoid pressure applied:No; Procedure status:No trauma, Dentition as pre-op  Mouth opening: > = 3 FB Dental:          Pulmonary: breath sounds clear to auscultation      (-) COPD, asthma, shortness of breath and recent URI                           Cardiovascular:  Exercise tolerance: no interval change,   (+) hypertension:,     (-) pacemaker, past MI, CAD, CABG/stent,  angina,  CHF, orthopnea, PND and  GOMEZ      Rhythm: regular  Rate: normal                 ROS comment: Prior TTE 2017: The left ventricle is small in size. There is mild concentric left ventricular hypertrophy. Left ventricular systolic function is normal. (LVEF>/=55%)  Overall left ventricular ejection fraction is estimated to be 55-60%. Septal motion is consistent with conduction abnormality. The diastolic function is impaired and classified as Grade 1  (impaired relaxation). Trace pericardial effusion. Neuro/Psych:      (-) seizures, TIA and CVA            ROS comment: Short term memory loss, possible POCD GI/Hepatic/Renal:        (-) liver disease and no renal disease       Endo/Other: Negative Endo/Other ROS                    Abdominal:             Vascular:     - PVD, DVT and PE. Other Findings:           Anesthesia Plan      general     ASA 3     (Standard ASA monitors)  Induction: intravenous.     MIPS: Postoperative opioids intended and Prophylactic antiemetics administered. Anesthetic plan and risks discussed with patient.                       Kody Goncalves MD   2/18/2022

## 2022-02-18 NOTE — BRIEF OP NOTE
Brief Postoperative Note      Patient: Fatmata Hough  YOB: 1933  MRN: [id number]    Date of Procedure: 2/18/2022    Pre-Op Diagnosis: RECTAL PROLAPSE    Post-Op Diagnosis: Same       Procedure(s):  TRANSPERINEAL RECTOSIGMOIDECTOMY (ALTEMEIER PROCEDURE), FLEXIBLE SIGMOIDOSCOPY    Surgeon(s):  Lasha Valdez MD    Assistant:  Surgical Assistant: Corrie Young    Anesthesia: General    Estimated Blood Loss (mL): less than 50     Complications: None    Specimens:   ID Type Source Tests Collected by Time Destination   A : A.  Rectosigmoidectomy Tissue Tissue SURGICAL PATHOLOGY Lasha Valdez MD 2/18/2022 1414        Implants:  * No implants in log *      Drains:   Urethral Catheter Double-lumen;Non-latex 16 fr (Active)       Findings: 8 inches of rectosigmoid prolapse    Electronically signed by Lasha Valdez MD on 2/18/2022 at 2:54 PM

## 2022-02-18 NOTE — H&P
PRE-OP/PRE-PROCEDURE H&P    Visit Date: 2/18/2022    History:     Luis Angel Pereira is a 80 y.o. female who presents today for procedure. See my/PCP/oncologist office notes for indications and details. There is no problem list on file for this patient. Current Facility-Administered Medications:     0.9 % sodium chloride infusion, , IntraVENous, Continuous, Shiva Shukla MD, Last Rate: 125 mL/hr at 02/18/22 1208, New Bag at 02/18/22 1208    sodium chloride flush 0.9 % injection 10 mL, 10 mL, IntraVENous, 2 times per day, Shiva Shukla MD    sodium chloride flush 0.9 % injection 10 mL, 10 mL, IntraVENous, PRN, Shiva Shukla MD    0.9 % sodium chloride infusion, 25 mL, IntraVENous, PRN, Shiva Shukla MD    metronidazole (FLAGYL) 500 mg in NaCl 100 mL IVPB premix, 500 mg, IntraVENous, On Call to OR, Last Rate: 100 mL/hr at 02/18/22 1215, 500 mg at 02/18/22 1215 **AND** levoFLOXacin (LEVAQUIN) 500 MG/100ML infusion 500 mg, 500 mg, IntraVENous, On Call to OR, Latasha Chappell MD  Prior to Admission medications    Medication Sig Start Date End Date Taking? Authorizing Provider   calcium carbonate (OSCAL) 500 MG TABS tablet Take 400 mg by mouth 2 times daily   Yes Historical Provider, MD   metroNIDAZOLE (FLAGYL) 500 MG tablet Take one tablet by mouth 3 times on the day prior to surgery. Take one tablet at 1pm, 2pm and 9pm. 2/11/22  Yes Latasha Chappell MD   neomycin (MYCIFRADIN) 500 MG tablet Take two tablets 3 times the day before surgery.  Take two tablets at 1pm, two tablets at 2pm and two tablets at 9pm. 2/11/22  Yes Latasha Chappell MD   atorvastatin (LIPITOR) 20 MG tablet Take 20 mg by mouth at bedtime  11/15/21  Yes Historical Provider, MD   triamterene-hydroCHLOROthiazide (MAXZIDE-25) 37.5-25 MG per tablet Take 1 tablet by mouth daily  11/15/21  Yes Historical Provider, MD   amLODIPine (NORVASC) 5 MG tablet Take 5 mg by mouth nightly  11/15/21  Yes Historical Provider, MD     No Known Allergies  Past Medical History:   Diagnosis Date    Arthritis     bilateral knee    Forgetfulness     Hx of blood clots 2020    DVT    Hyperlipidemia     Hypertension     Wears glasses     reading     Past Surgical History:   Procedure Laterality Date    CHOLECYSTECTOMY      HERNIA REPAIR      hiatal hernia    HYSTERECTOMY, TOTAL ABDOMINAL      complete         Physical Exam:     BP (!) 133/59   Pulse 74   Temp 97.3 °F (36.3 °C) (Temporal)   Resp 16   Ht 5' (1.524 m)   Wt 130 lb (59 kg)   SpO2 98%   BMI 25.39 kg/m²  Body mass index is 25.39 kg/m². Constitutional: Appears well-developed and well-nourished. Head: Normocephalic, atraumatic. Eyes: No scleral icterus. Vision intact grossly. ENT: Hearing grossly intact. No facial deformity. Neck: Normal range of motion. No tracheal deviation. Cardiovascular: Normal rate. No peripheral edema. Pulmonary/Chest: Effort normal. No respiratory distress. No wheezes. No use of accessory muscles. Musculoskeletal: No gross deformity. Neurological: Alert and oriented to person, place, and time. No gross deficits. Skin: Skin is dry. No rash noted. No pallor. Psychiatric: Normal mood and affect. Behavior normal. Oriented to person, place, and time. Abdomen: soft, NTTP, non distended    Recent labs and imaging reviewed as necessary. Assessment/Plan:       Proceed as planned for Altemeier procedure    Risks/benefits/alternatives of procedure/surgery discussed with patient and any present family members (or appropriate guardian) and understanding verbalized. All questions answered. Patient wishes to proceed.     Electronically signed by Los Ho MD on 2/18/2022 at 12:22 PM

## 2022-02-19 LAB
ANION GAP SERPL CALCULATED.3IONS-SCNC: 14 MMOL/L (ref 3–16)
BUN BLDV-MCNC: 15 MG/DL (ref 7–20)
CALCIUM SERPL-MCNC: 8.4 MG/DL (ref 8.3–10.6)
CHLORIDE BLD-SCNC: 102 MMOL/L (ref 99–110)
CO2: 23 MMOL/L (ref 21–32)
CREAT SERPL-MCNC: 0.8 MG/DL (ref 0.6–1.2)
GFR AFRICAN AMERICAN: >60
GFR NON-AFRICAN AMERICAN: >60
GLUCOSE BLD-MCNC: 125 MG/DL (ref 70–99)
HCT VFR BLD CALC: 31 % (ref 36–48)
HEMOGLOBIN: 10.4 G/DL (ref 12–16)
MAGNESIUM: 1.6 MG/DL (ref 1.8–2.4)
MCH RBC QN AUTO: 27.9 PG (ref 26–34)
MCHC RBC AUTO-ENTMCNC: 33.7 G/DL (ref 31–36)
MCV RBC AUTO: 82.8 FL (ref 80–100)
PDW BLD-RTO: 14.4 % (ref 12.4–15.4)
PLATELET # BLD: 225 K/UL (ref 135–450)
PMV BLD AUTO: 9.6 FL (ref 5–10.5)
POTASSIUM REFLEX MAGNESIUM: 2.9 MMOL/L (ref 3.5–5.1)
RBC # BLD: 3.74 M/UL (ref 4–5.2)
SODIUM BLD-SCNC: 139 MMOL/L (ref 136–145)
WBC # BLD: 11.4 K/UL (ref 4–11)

## 2022-02-19 PROCEDURE — 99024 POSTOP FOLLOW-UP VISIT: CPT | Performed by: SURGERY

## 2022-02-19 PROCEDURE — 83735 ASSAY OF MAGNESIUM: CPT

## 2022-02-19 PROCEDURE — 6370000000 HC RX 637 (ALT 250 FOR IP): Performed by: SURGERY

## 2022-02-19 PROCEDURE — 94760 N-INVAS EAR/PLS OXIMETRY 1: CPT

## 2022-02-19 PROCEDURE — 2580000003 HC RX 258: Performed by: SURGERY

## 2022-02-19 PROCEDURE — 80048 BASIC METABOLIC PNL TOTAL CA: CPT

## 2022-02-19 PROCEDURE — 1200000000 HC SEMI PRIVATE

## 2022-02-19 PROCEDURE — 6360000002 HC RX W HCPCS: Performed by: SURGERY

## 2022-02-19 PROCEDURE — APPNB45 APP NON BILLABLE 31-45 MINUTES: Performed by: NURSE PRACTITIONER

## 2022-02-19 PROCEDURE — 36415 COLL VENOUS BLD VENIPUNCTURE: CPT

## 2022-02-19 PROCEDURE — 6370000000 HC RX 637 (ALT 250 FOR IP): Performed by: NURSE PRACTITIONER

## 2022-02-19 PROCEDURE — 85027 COMPLETE CBC AUTOMATED: CPT

## 2022-02-19 PROCEDURE — 51702 INSERT TEMP BLADDER CATH: CPT

## 2022-02-19 RX ORDER — LANOLIN ALCOHOL/MO/W.PET/CERES
400 CREAM (GRAM) TOPICAL ONCE
Status: COMPLETED | OUTPATIENT
Start: 2022-02-19 | End: 2022-02-19

## 2022-02-19 RX ORDER — POTASSIUM CHLORIDE 20 MEQ/1
40 TABLET, EXTENDED RELEASE ORAL ONCE
Status: COMPLETED | OUTPATIENT
Start: 2022-02-19 | End: 2022-02-19

## 2022-02-19 RX ADMIN — Medication 400 MG: at 13:16

## 2022-02-19 RX ADMIN — Medication 10 MEQ: at 13:17

## 2022-02-19 RX ADMIN — TRIAMTERENE AND HYDROCHLOROTHIAZIDE 1 TABLET: 37.5; 25 TABLET ORAL at 08:25

## 2022-02-19 RX ADMIN — Medication 10 MEQ: at 06:57

## 2022-02-19 RX ADMIN — Medication 10 MEQ: at 08:26

## 2022-02-19 RX ADMIN — DEXTROSE AND SODIUM CHLORIDE: 5; 450 INJECTION, SOLUTION INTRAVENOUS at 06:38

## 2022-02-19 RX ADMIN — POTASSIUM CHLORIDE 40 MEQ: 20 TABLET, EXTENDED RELEASE ORAL at 13:15

## 2022-02-19 RX ADMIN — ATORVASTATIN CALCIUM 20 MG: 20 TABLET, FILM COATED ORAL at 20:47

## 2022-02-19 RX ADMIN — ENOXAPARIN SODIUM 40 MG: 100 INJECTION SUBCUTANEOUS at 08:27

## 2022-02-19 RX ADMIN — Medication 10 MEQ: at 10:11

## 2022-02-19 RX ADMIN — OXYCODONE 5 MG: 5 TABLET ORAL at 20:47

## 2022-02-19 RX ADMIN — AMLODIPINE BESYLATE 5 MG: 5 TABLET ORAL at 20:47

## 2022-02-19 ASSESSMENT — PAIN SCALES - GENERAL
PAINLEVEL_OUTOF10: 0
PAINLEVEL_OUTOF10: 0
PAINLEVEL_OUTOF10: 6
PAINLEVEL_OUTOF10: 6

## 2022-02-19 ASSESSMENT — PAIN DESCRIPTION - PROGRESSION: CLINICAL_PROGRESSION: GRADUALLY IMPROVING

## 2022-02-19 ASSESSMENT — PAIN DESCRIPTION - PAIN TYPE: TYPE: CHRONIC PAIN

## 2022-02-19 ASSESSMENT — PAIN DESCRIPTION - FREQUENCY: FREQUENCY: INTERMITTENT

## 2022-02-19 ASSESSMENT — PAIN DESCRIPTION - LOCATION: LOCATION: GENERALIZED

## 2022-02-19 ASSESSMENT — PAIN DESCRIPTION - ONSET: ONSET: ON-GOING

## 2022-02-19 ASSESSMENT — PAIN DESCRIPTION - DESCRIPTORS: DESCRIPTORS: ACHING;DISCOMFORT;DULL

## 2022-02-19 ASSESSMENT — PAIN DESCRIPTION - ORIENTATION: ORIENTATION: RIGHT;LEFT;UPPER;OUTER

## 2022-02-19 ASSESSMENT — PAIN - FUNCTIONAL ASSESSMENT: PAIN_FUNCTIONAL_ASSESSMENT: PREVENTS OR INTERFERES SOME ACTIVE ACTIVITIES AND ADLS

## 2022-02-19 NOTE — PLAN OF CARE
Problem: Falls - Risk of:  Goal: Will remain free from falls  Description: Will remain free from falls  2/18/2022 2314 by Elias Rangel RN  Outcome: Ongoing  2/18/2022 1924 by Simone Dutta RN  Outcome: Ongoing  Goal: Absence of physical injury  Description: Absence of physical injury  2/18/2022 2314 by Elias Rangel RN  Outcome: Ongoing  2/18/2022 1924 by Simone Dutta RN  Outcome: Ongoing   Pt free from falls this shift. Fall precautions in place at all times. Call light always within reach. Pt able and agreeable to contact for safety appropriately. Problem: Skin Integrity:  Goal: Will show no infection signs and symptoms  Description: Will show no infection signs and symptoms  2/18/2022 2314 by Elias Rangel RN  Outcome: Ongoing  2/18/2022 1924 by Simone Dutta RN  Outcome: Ongoing  Goal: Absence of new skin breakdown  Description: Absence of new skin breakdown  2/18/2022 2314 by Elias Rangel RN  Outcome: Ongoing  2/18/2022 1924 by Simone Dutta RN  Outcome: Ongoing   Skin assessment performed each shift per protocol. Patient turned and repositioned every two hours and prn with pillow support. Patient checked for incontence every two hours.

## 2022-02-19 NOTE — FLOWSHEET NOTE
Pt bed alarm sounding. Charge nurse went into room to find pt on side of bed with IV pulled out and blood all over sheets, pt pulled her gore out with bulb intact and had all clothes removed. Pt very confused and disoriented. Staff x 3 to assist pt with clean up. Pt was put on wait list for a tele camera.

## 2022-02-19 NOTE — FLOWSHEET NOTE
1 of 6 K runners started at this time. Pt remains acutely confused and disoriented. Clay and peripheral IV remain intact at this time. Pneumo boots on pt and functioning properly. All fall precautions in place.

## 2022-02-19 NOTE — PROGRESS NOTES
General Surgery  Daily Progress Note    Pt Name: [facility]  Medical Record Number: [id number]  Date of Birth 6/21/1933   Today's Date: 2/19/2022    No chief complaint on file. ASSESSMENT/PLAN  1. Rectal prolapse s/p Altemeier POD #1 - doing well. Gore self removed overnight and replaced. Will d/c gore this morning. Regular diet. Hopefully will be able to discharge later today. 2. Post operative hypoxia - resolved, off of oxygen  3. Hypokalemia -replace  4. Ambulate/OOB      SUBJECTIVE  Lore Bride has improved from yesterday. Denies any abdominal pain. Tolerating diet. OBJECTIVE  VITALS:  height is 5' (1.524 m) and weight is 127 lb 3.3 oz (57.7 kg). Her axillary temperature is 97.4 °F (36.3 °C). Her blood pressure is 118/72 and her pulse is 75. Her respiration is 16 and oxygen saturation is 95%. GENERAL: alert, no distress  LUNGS: normal respiratory effort, no accessory muscle use  ABDOMEN: soft, NT, ND  EXTREMITY: no cyanosis and no clubbing  I/O last 3 completed shifts: In: 1400.4 [I.V.:1327.3;  IV Piggyback:73]  Out: 50 [Urine:50]  I/O this shift:  In: 360 [P.O.:360]  Out: -     LABS  Recent Labs     02/19/22  0436   WBC 11.4*   HGB 10.4*   HCT 31.0*         K 2.9*      CO2 23   BUN 15   CREATININE 0.8   MG 1.60*   CALCIUM 8.4     CBC with Differential:    Lab Results   Component Value Date    WBC 11.4 02/19/2022    RBC 3.74 02/19/2022    HGB 10.4 02/19/2022    HCT 31.0 02/19/2022     02/19/2022    MCV 82.8 02/19/2022    MCH 27.9 02/19/2022    MCHC 33.7 02/19/2022    RDW 14.4 02/19/2022     BMP:    Lab Results   Component Value Date     02/19/2022    K 2.9 02/19/2022     02/19/2022    CO2 23 02/19/2022    BUN 15 02/19/2022    CREATININE 0.8 02/19/2022    CALCIUM 8.4 02/19/2022    GFRAA >60 02/19/2022    LABGLOM >60 02/19/2022    GLUCOSE 125 02/19/2022     Hepatic Function Panel:  No results found for: ALKPHOS, ALT, AST, PROT, BILITOT, BILIDIR, IBILI, Deisy Leon MD  Electronically signed 2/19/2022 at 11:25 AM

## 2022-02-19 NOTE — PROGRESS NOTES
Pt arrived to room 4103. VSS. Oriented pt to room call light in reach. Bed in lowest position. Will continue to monitor.

## 2022-02-19 NOTE — PLAN OF CARE
Problem: Falls - Risk of:  Goal: Will remain free from falls  Description: Will remain free from falls  2/19/2022 6283 by Susy Hoyos RN  Outcome: Ongoing  2/18/2022 2314 by Ladonna Brink RN  Outcome: Ongoing  2/18/2022 1924 by Elliot Mathews RN  Outcome: Ongoing  Goal: Absence of physical injury  Description: Absence of physical injury  2/19/2022 0712 by Susy Hoyos RN  Outcome: Ongoing  2/18/2022 2314 by Ladonna Brink RN  Outcome: Ongoing  2/18/2022 1924 by Elliot Mathews RN  Outcome: Ongoing     Problem: Skin Integrity:  Goal: Will show no infection signs and symptoms  Description: Will show no infection signs and symptoms  2/19/2022 0712 by Susy Hoyos RN  Outcome: Ongoing  2/18/2022 2314 by Ladonna Brink RN  Outcome: Ongoing  2/18/2022 1924 by Elliot Mathews RN  Outcome: Ongoing  Goal: Absence of new skin breakdown  Description: Absence of new skin breakdown  2/19/2022 0712 by Susy Hoyos RN  Outcome: Ongoing  2/18/2022 2314 by Ladonna Brink RN  Outcome: Ongoing  2/18/2022 1924 by Elliot Mathews RN  Outcome: Ongoing

## 2022-02-19 NOTE — PROGRESS NOTES
4 Eyes Skin Assessment     NAME:  Vivian Pabon  YOB: 1933  MEDICAL RECORD NUMBER:  [id number]    The patient is being assess for  Admission    I agree that 2 RN's have performed a thorough Head to Toe Skin Assessment on the patient. ALL assessment sites listed below have been assessed. Areas assessed by both nurses:    Head, Face, Ears, Shoulders, Back, Chest, Arms, Elbows, Hands and Legs. Feet and Heels        Does the Patient have a Wound?  No noted wound(s)       Roque Prevention initiated:  No   Wound Care Orders initiated:  No    Pressure Injury (Stage 3,4, Unstageable, DTI, NWPT, and Complex wounds) if present place consult order under [de-identified] No    New and Established Ostomies if present place consult order under : No      Nurse 1 eSignature: Electronically signed by Rosalee Ortiz RN on 2/18/22 at 7:40 PM EST    **SHARE this note so that the co-signing nurse is able to place an eSignature**    Nurse 2 eSignature: Electronically signed by Amanda Boateng RN on 2/18/22 at 11:21 PM EST

## 2022-02-19 NOTE — PROGRESS NOTES
Removed patient gore. Pt tolerated wll, there ewas 800 urine in bag  Educated patient to call for assistance getting up to use the restroom. Pt verbalized understanding. Bed is low, alarm on, brake locked and call light within reach. Pt sons also present at bedside.     Electronically signed by Merari Fitzpatrick RN on 2/19/2022

## 2022-02-19 NOTE — OP NOTE
[address] [place]                                 OPERATIVE REPORT    PATIENT NAME: Celeste Barnes                     :        1933  MED REC NO:   [id number]                          ROOM:       4103  ACCOUNT NO:   [de-identified]                           ADMIT DATE: 2022  PROVIDER:     Nikita Santoro. MD Adriana      DATE OF PROCEDURE:  2022    PREOPERATIVE DIAGNOSIS:  Full-thickness rectal prolapse. POSTOPERATIVE DIAGNOSIS:  Full-thickness rectal prolapse. OPERATION PERFORMED:  Perineal approach proctosigmoidectomy with  coloanal anastomosis (Altemeier's procedure). SURGEON:  Nikita Santoro. MD Adriana    EBL:  50 mL. COMPLICATIONS:  None. INDICATIONS:  The patient is an 60-year-old female. She presented with  rectal prolapse in the office. She is having severe symptoms that are  interfering with daily life and despite her old age, she is otherwise  decently healthy other than some memory issues and hypertension. I  offered a perineal approach proctosigmoidectomy. I discussed with her  and her  as well as her son the risks of the procedure including  but not limited to bleeding, infection, anastomotic leak, need for  additional operations. We discussed the risk of recurrence, discussed  postoperative expectations including hospital stay. They understood all  the risks and agreed to proceed. OPERATIVE PROCEDURE:  The patient was brought to the operating theater  and placed supine on the operating table. General endotracheal  intubation was performed by Anesthesiology. The patient was placed in  lithotomy position. A Clay catheter was placed draining clear yellow  urine. Her perineum was then prepped and draped in the usual sterile  fashion using Betadine prep solution. A time-out was performed  confirming the patient's identity as well as the operative site.    Antibiotics were confirmed to be perfusing. All safety points were  followed. SCDs were on and functioning. Lovenox was confirmed given. I began by using _____ forceps to prolapse the segment as much as it  would come out. The [facility] [place] retractor was then placed with sharp  hooks circumferentially around the perineum. I was then able to  evaluate the dentate line. I chose an area about 1.5 cm from the  dentate line and used cautery to make a margoth around circumferentially. I then deepened that into a mucosal and submucosal incision until the  muscle layer was exposed. I continued using combination of cautery as  well as the LigaSure device to come through the muscle layer until I  reached the mesorectum. I came through the mesorectum with the LigaSure  device. At this point, I was able to unravel the distal rectum and  prolapse it out of the perineum. I noted the vagina was well out of  harm's way. I was then able to enter the hernia sac of the anterior  peritoneal reflection. I noted no evidence of small bowel prolapse in  the pelvis. I then used this as my marker to continue along   the entirety of the perineal attachments. I noted that the prolapse  came all the way down to about the distal sigmoid. She was noted to  have severe amount of diverticulosis. I found an area where there was  just slight touch of tension and determined that this would be the area  for our anastomosis. I then circumferentially dissected around the sigmoid colon, used the  LigaSure device to take down all the remaining mesentery of the sigmoid  colon. I then placed sutures in all four anterior, posterior, left and  right lateral positions through the perineum and left the other side  dangling. I then used the cautery to make a proctotomy on the anterior  surface. Once I made a small colotomy, I then used the previously  placed suture to perform a full-thickness suture.   I did this similarly  left and right lateral and then finally on the posterior midline. At  this point, the specimen was completely excised and passed off the table  labeled as Altemeier's procedure. I then used several 2-0 Vicryl sutures to then start filling in the  gaps. I started again by securing down the cardinal directions left and  right lateral, posterior and anterior midline. I then used sutures in  between each of those. I then continued placing sutures in between  sutures as needed until the entire anastomosis was created in a single  layer of full-thickness interrupted coloanal anastomosis using several  2-0 Vicryl sutures. I then used the lighted anoscope to ensure that all  areas were completely sutured and that there were no gaps in the  anastomosis. I then removed the hooks of the [employer]. I then performed a  flexible sigmoidoscopy. I confirmed that the entirety of the mucosa  appeared to be healthy and viable. The anastomosis was completely  intact. I was very happy with how this looked. A 20 mL of Exparel was  injected in the perineum. A Gelfoam was then rolled up and placed into  the rectum. Guaze and mesh mesh pants was then used on the perineum. The patient tolerated the procedure well. She was then extubated and  brought to the PACU in stable condition. All counts were correct x2 at  the end of the procedure. There were no complications. The patient's  family was updated on the operative findings.         Queta Marin MD    D: 02/18/2022 15:00:32       T: 02/18/2022 23:27:37     [email or web address]  Job#: [id number]     Doc#: [id number]    CC:

## 2022-02-20 LAB
ANION GAP SERPL CALCULATED.3IONS-SCNC: 13 MMOL/L (ref 3–16)
BASOPHILS ABSOLUTE: 0 K/UL (ref 0–0.2)
BASOPHILS RELATIVE PERCENT: 0.4 %
BUN BLDV-MCNC: 13 MG/DL (ref 7–20)
CALCIUM SERPL-MCNC: 8.3 MG/DL (ref 8.3–10.6)
CHLORIDE BLD-SCNC: 102 MMOL/L (ref 99–110)
CO2: 23 MMOL/L (ref 21–32)
CREAT SERPL-MCNC: 0.8 MG/DL (ref 0.6–1.2)
EOSINOPHILS ABSOLUTE: 0.2 K/UL (ref 0–0.6)
EOSINOPHILS RELATIVE PERCENT: 1.7 %
GFR AFRICAN AMERICAN: >60
GFR NON-AFRICAN AMERICAN: >60
GLUCOSE BLD-MCNC: 128 MG/DL (ref 70–99)
HCT VFR BLD CALC: 31.3 % (ref 36–48)
HEMOGLOBIN: 10.6 G/DL (ref 12–16)
LYMPHOCYTES ABSOLUTE: 0.9 K/UL (ref 1–5.1)
LYMPHOCYTES RELATIVE PERCENT: 8.1 %
MCH RBC QN AUTO: 28.1 PG (ref 26–34)
MCHC RBC AUTO-ENTMCNC: 33.8 G/DL (ref 31–36)
MCV RBC AUTO: 83.2 FL (ref 80–100)
MONOCYTES ABSOLUTE: 0.9 K/UL (ref 0–1.3)
MONOCYTES RELATIVE PERCENT: 8.4 %
NEUTROPHILS ABSOLUTE: 8.6 K/UL (ref 1.7–7.7)
NEUTROPHILS RELATIVE PERCENT: 81.4 %
PDW BLD-RTO: 14.7 % (ref 12.4–15.4)
PLATELET # BLD: 209 K/UL (ref 135–450)
PMV BLD AUTO: 9.6 FL (ref 5–10.5)
POTASSIUM REFLEX MAGNESIUM: 3.7 MMOL/L (ref 3.5–5.1)
RBC # BLD: 3.76 M/UL (ref 4–5.2)
SODIUM BLD-SCNC: 138 MMOL/L (ref 136–145)
WBC # BLD: 10.5 K/UL (ref 4–11)

## 2022-02-20 PROCEDURE — 6360000002 HC RX W HCPCS: Performed by: SURGERY

## 2022-02-20 PROCEDURE — 36415 COLL VENOUS BLD VENIPUNCTURE: CPT

## 2022-02-20 PROCEDURE — 99024 POSTOP FOLLOW-UP VISIT: CPT | Performed by: SURGERY

## 2022-02-20 PROCEDURE — 80048 BASIC METABOLIC PNL TOTAL CA: CPT

## 2022-02-20 PROCEDURE — 85025 COMPLETE CBC W/AUTO DIFF WBC: CPT

## 2022-02-20 PROCEDURE — 6370000000 HC RX 637 (ALT 250 FOR IP): Performed by: SURGERY

## 2022-02-20 PROCEDURE — 1200000000 HC SEMI PRIVATE

## 2022-02-20 RX ORDER — DOCUSATE SODIUM 100 MG/1
100 CAPSULE, LIQUID FILLED ORAL 2 TIMES DAILY PRN
Qty: 60 CAPSULE | Refills: 0 | Status: SHIPPED | OUTPATIENT
Start: 2022-02-20

## 2022-02-20 RX ADMIN — ACETAMINOPHEN 325 MG: 325 TABLET ORAL at 17:52

## 2022-02-20 RX ADMIN — OXYCODONE 5 MG: 5 TABLET ORAL at 10:57

## 2022-02-20 RX ADMIN — ATORVASTATIN CALCIUM 20 MG: 20 TABLET, FILM COATED ORAL at 20:25

## 2022-02-20 RX ADMIN — AMLODIPINE BESYLATE 5 MG: 5 TABLET ORAL at 20:25

## 2022-02-20 RX ADMIN — ENOXAPARIN SODIUM 40 MG: 100 INJECTION SUBCUTANEOUS at 09:30

## 2022-02-20 RX ADMIN — TRIAMTERENE AND HYDROCHLOROTHIAZIDE 1 TABLET: 37.5; 25 TABLET ORAL at 09:30

## 2022-02-20 ASSESSMENT — PAIN SCALES - GENERAL
PAINLEVEL_OUTOF10: 6
PAINLEVEL_OUTOF10: 7
PAINLEVEL_OUTOF10: 0

## 2022-02-20 NOTE — PROGRESS NOTES
Ambulated patient in lemos with pt son present and PCA assist  Pt tolerates and needs encouragement from family. Pt family declines needing assistance or the need for PT/OT or home care. Pt  states \"she doesn't want strangers helping her\"  Pt son asked for advice on \"how to help her in the bathroom\"    I asked for clarification; If patient needs help in the restroom and patient son said \"No, never mind\"  Pt c/o arthritic pain in her knees. PRN Medication given per request. (SEE MAR)    IV removed and discharge teaching provided to patient and her family. They indicated they will dress her and take her home. No further needs expressed. All questions answered.     Electronically signed by Rochelle Ott RN on 2/20/2022 at 11:16 AM

## 2022-02-20 NOTE — PROGRESS NOTES
General Surgery  Daily Progress Note    Pt Name: Fazal Loomis  Medical Record Number: [id number]  Date of Birth 6/21/1933   Today's Date: 2/20/2022    No chief complaint on file. ASSESSMENT/PLAN  1. Rectal prolapse s/p [place] POD #2 - doing well. Voiding after gore removed yesterday. Tolerating regular diet. Will ambulate today to determine possible discharge. Otherwise, will get PT/OT eval.  2. Post operative hypoxia - resolved, off oxygen  3. Hypokalemia -replace  4. Ambulate/OOB      SUBJECTIVE  Arielle Molina has improved from yesterday. Denies any abdominal pain. Tolerating diet. OBJECTIVE  VITALS:  height is 5' (1.524 m) and weight is 139 lb 15.9 oz (63.5 kg). Her oral temperature is 99.8 °F (37.7 °C). Her blood pressure is 129/53 (abnormal) and her pulse is 90. Her respiration is 18 and oxygen saturation is 92%. GENERAL: alert, no distress  LUNGS: normal respiratory effort, no accessory muscle use  ABDOMEN: soft, NT, ND  EXTREMITY: no cyanosis and no clubbing  I/O last 3 completed shifts: In: 1260.4 [P.O.:360; I.V.:827.3; IV Piggyback:73]  Out: -   No intake/output data recorded. LABS  Recent Labs     02/19/22  0436 02/19/22  0436 02/20/22  0437 02/20/22  0438   WBC 11.4*   < >  --  10.5   HGB 10.4*   < >  --  10.6*   HCT 31.0*   < >  --  31.3*      < >  --  209      < > 138  --    K 2.9*   < > 3.7  --       < > 102  --    CO2 23   < > 23  --    BUN 15   < > 13  --    CREATININE 0.8   < > 0.8  --    MG 1.60*  --   --   --    CALCIUM 8.4   < > 8.3  --     < > = values in this interval not displayed.      CBC with Differential:    Lab Results   Component Value Date    WBC 10.5 02/20/2022    RBC 3.76 02/20/2022    HGB 10.6 02/20/2022    HCT 31.3 02/20/2022     02/20/2022    MCV 83.2 02/20/2022    MCH 28.1 02/20/2022    MCHC 33.8 02/20/2022    RDW 14.7 02/20/2022    LYMPHOPCT 8.1 02/20/2022    MONOPCT 8.4 02/20/2022    BASOPCT 0.4 02/20/2022    MONOSABS 0.9 02/20/2022    LYMPHSABS 0.9 02/20/2022    EOSABS 0.2 02/20/2022    BASOSABS 0.0 02/20/2022     BMP:    Lab Results   Component Value Date     02/20/2022    K 3.7 02/20/2022     02/20/2022    CO2 23 02/20/2022    BUN 13 02/20/2022    CREATININE 0.8 02/20/2022    CALCIUM 8.3 02/20/2022    GFRAA >60 02/20/2022    LABGLOM >60 02/20/2022    GLUCOSE 128 02/20/2022     Hepatic Function Panel:  No results found for: ALKPHOS, ALT, AST, PROT, BILITOT, BILIDIR, IBILI, Earezekiel Hughes MD  Electronically signed 2/20/2022 at 10:49 AM

## 2022-02-20 NOTE — FLOWSHEET NOTE
Pt slept well overnight. No behaviors compared to night before. Remains pleasantly confused. Resting comfortably at this time with all fall precautions in place. Call light in reach.

## 2022-02-20 NOTE — CARE COORDINATION
Discharge order noted. Chart reviewed. Spoke with RN. Family denies needs at this time. Plan is to return home with family.     Electronically signed by Nhi Carter RN MSN on 2/20/2022 at 10:58 AM Patient:  Cortez George Location:  [facility]   :  1942 Attending Physician:  Radha Espinosa M.D.     Date:  Dec 23, 2019 Note Type: Progress Note       Complaint/Presenting Problem:  Right upper lobe large mass/Pancoast tumor 9.2 x 7.3 x 11.3 cm, status post core biopsy and pathology consistent with squamous cell carcinoma, moderately differentiated.    HPI:  77-year old (at diagnosis) male patient with past medical history of hyperlipidemia, abdominal aneurysm status post stent placement in , prior history of significant tobacco abuse (more than 50 pack years quit in ), recently diagnosed with large right upper lobe lung mass here for initial oncologic consultation on further management.    Patient reports a history of persistent and worsening cough over the last year, weight loss of over 15 pounds over the last several months, gradually worsening pain, numbness, pulling sensation in right neck with radiation to his shoulder over last several weeks.  He has a heavy history of tobacco abuse 1 pack/day for over 50 years but he was able to quit in .  Denies fevers, chills, shortness of breath, dyspnea on exertion, chest pain, abdominal pain, hemoptysis, hematemesis, melena, hematochezia.    9/15/2019: CT neck and soft tissues: Right upper lobe heterogeneous mass 8 x 7 x 11 cm involving the anterior chest wall extending into the upper mediastinum with compression of the right subclavian vein and superior vena cava, multiple collateral venous structures are seen in the posterior aspect of the neck bilaterally, right larger than left.  No cervical lymphadenopathy noted.    2019: CT chest with IV contrast: Large mass occupying the anterior and superior aspect of right upper and middle lobes with septal thickening in the same region, necrotic, 9.2 x 7.3 x 11.3 cm with adjacent groundglass opacity along the margins of the mass (atelectasis versus tumor infiltration into the adjacent  lung parenchyma?),  No pleural effusion, multiple right renal cysts, largest 5 x 5 cm, no acute bone abnormality.  No pathologic lymphadenopathy.    9/26/2019: Right upper lobe core needle biopsy ([facility]): Squamous cell carcinoma, moderately differentiated.    Patient has no personal history of other cancer.  Family history is significant for cancer in his mother at age 85 (unknown histology) and cancer in his brother at age 75 (unknown histology).  The patient is independent in all his ADLs and enjoys a good quality of life.    10/21/2019: Patient returns to discuss imaging results and further treatment recommendations.  Continues to endorse right neck pain with radiation to his shoulder and persistent cough.  Denies hemoptysis, hematemesis, dyspnea on exertion, chest pain, shortness of breath.  10/31/2019: Patient returns for discussion of his oncologic care plan.  He continues to have right neck pain with radiation to his shoulder and persistent cough.  No hemoptysis/hematemesis/dyspnea on exertion/chest pain or shortness of breath.  11/13/2019: Patient returns for C1 D1 carboplatin and paclitaxel with initiation of radiation therapy later today.  He offers no new complaints, continues to have intermittent cough and right cervical pain with radiation to shoulder.  11/27/2019: Patient returns for C1 D15 carboplatin and paclitaxel concurrent with radiation therapy.  In the interim, he went to the ED for acute onset abdominal pain.  Work-up negative, patient subsided with Tums as needed and Protonix.  All his symptoms have now resolved.  He offers no new complaints.  He has noticed improvement in his right upper extremity mobility with ability to abduct his arm post initiation of therapy.  12/4/2019: Patient returns for C1 day 22 carboplatin and paclitaxel concurrent with radiation therapy therapy.  C1 day 15 not given secondary to grade 3 neutropenia.  He is overall doing well.  Improvement in  symptoms as outlined above.  12/16/19: Returns for follow-up. Reports mild pain with swallowing localized to the neck. Otherwise tolerating treatment well. Plan per radiology is for three more fractions of radiation before temporarily holding for repeat imaging.  12/23/2019: Patient returns for follow-up and C1 day 29 carboplatin and paclitaxel concurrent with radiation therapy.  Tolerating treatment well.  CT chest 12/20/2019 reveals interval decrease in mass now measuring at 7 cm with necrosis (previously 10 cm).    PMH:   Mr. George's medical history consists of asthma and hyperlipidemia.    Current Medications:   Benzonatate, Carboplatin, Dexamethasone, DiphenhydrAMINE HCl, Granisetron HCl, Paclitaxel, Pantoprazole Sodium, Prochlorperazine Maleate, Ranitidine HCl, Lipitor, Tylenol  Medications were reviewed and updated.      Allergies:   No Known Allergies.  Allergies were reviewed. No new allergies reported.    Past Surgical History:  Mr. George's surgical/procedural history consists of cataract removal in 2019 - bilateral, R rotator cuff repair in 2004, and Belly button removal in 1980.    Family History:  Mother and brother both have a history of cancer, but patient is not aware of what type.     Personal/Social History:   Mr. George is  and he is retired. Mr. George no longer smokes but had smoked 1.0 pack/day for 50 years. He has no history of drinking. He has indicated exposure to the following products: cigarettes.  Mr. George reports the following support systems: lives with spouse, significant other, family, or friends. His diet consists of regular meals. He indicates his activity level as: daily activities.     Review of Systems:     Constitutional Normal - No fevers, chills, night sweats, excessive fatigue.   Allergic/Immunologic Normal - No reactions.   Eyes Normal - No significant visual difficulties. No diplopia.   ENMT Normal - No problems with hearing, no sore throat, no sinus  drainage.   Endocrine Normal - No diabetes, thyroid disease or hormone replacement. No hot flashes or night sweats.   Hematologic/Lymphatic Normal - No easy bruising or bleeding.  The patient denies any tender or palpable lymph nodes.   Breasts Normal - No abnormal masses of breast, no nipple discharge or pain.   Respiratory Abnormal - No dyspnea on exertion, chest pain, cough or hemoptysis.  Reports improvement in cough since initiation of concurrent chemo RT   Cardiovascular Normal - No anginal chest pain, palpitations or orthopnea.   Gastrointestinal Normal - No nausea, vomiting, diarrhea, GI bleeding, or constipation. No change in bowel habits, no heartburn or early satiety.   Genitourinary (M) Normal - No hematuria, dysuria, increased frequency, urgency, hesitancy or incontinence.   Musculoskeletal Abnormal - No joint pain, swelling or redness. No decreased range of motion.  Chronic joint pain secondary to arthritis   Integumentary Normal - No chronic rashes, inflammation, ulcerations or skin changes.   Neurologic Abnormal - No headache, blurred vision, and no areas of focal weakness or numbness. Normal gait. No sensory problems.  Reports pain in his right neck radiating to his right shoulder over the last several weeks-now improved post initiation of concurrent chemo RT, patient able to abduct his right upper extremity above shoulder height   Psychiatric Normal - No insomnia, depression, josh or mood swings.  No psychotropic drugs.       Physical Examination:  Performed on Dec 23, 2019 09:24  Height - 158.20 cms   Weight - 47.45 kg (LOW) Shoes on   BSA - 1.46 sq.m   BMI - 18.9600   Temperature - 98.0 F   Pulse - 102 /min   Respiration - 18 /min   BP - 130/66 mm(hg)   Pain - 2 Shoulder left  0 - Fully active, able to carry on all predisease activities without restrictions. (ECOG)    Constitutional Alert, cooperative, oriented. Mood and affect appropriate. Appears close to chronological age.  Well developed.   Early cachexia   Head Normocephalic; no scars.   Eyes Conjunctivae and sclerae are clear and without icterus. Pupils are reactive and equal.   ENMT Sinuses are nontender.  No oral exudates, ulcers, masses, thrush or mucositis. Oropharynx clear.  Tongue normal.   Neck Supple without masses or thyromegaly. No jugular venous distension.   Hematologic/Lymphatic No petechiae or purpura.  No tender or palpable lymph nodes in the cervical, supraclavicular, axillary or inguinal area.   Respiratory Lungs are clear to auscultation without rhonchi or wheezing.   Cardiovascular Regular rate and rhythm of heart without murmurs, gallops or rubs. Tachycardic   Chest Chest is symmetric without chest wall deformities.   Abdomen Non-tender, non-distended, no masses, ascites or hepatosplenomegaly. Good bowel sounds. No guarding or rebound tenderness. No pulsatile masses.   Back/Spine No kyphosis, scoliosis, compression fractures.  Non-tender to palpation.no s/s suggestive of danish syndrome   Extremities No visible deformities, no cyanosis, clubbing or edema. Pulses 4+ and equal bilaterally.   Musculoskeletal No tenderness or swelling, normal range of motion without obvious weakness.   Integumentary No rashes, scars, or lesions suggestive of malignancy.   Neurologic No sensory or motor deficits, normal cerebellar function, normal gait, cranial nerves intact.   Psychiatric Alert and oriented times three. Coherent speech. Verbalizes understanding of our discussions today.        Laboratory:  Test performed on Dec 16, 2019 09:03    WBC 3.4 K/mcL(LOW)  RBC 3.87 mil/mcL(LOW)    Hgb 11.6 g/dL(LOW)  HCT 36.0 %(LOW)    MCV 93.0 fl  MCH 30.0 pg    MCHC 32.2 g/dL  RDW- CV 15.4 %(HIGH)    Platelet Count 180 K/mcL  Diff Type AUTOMATED DIFFERENTIAL     Neutrophils 2.5 K/mcL  Lymphocytes 0.5 K/mcL(LOW)    Mid Cells 0.4 K/mcL  Neutrophil % 76 %    Lymphocyte % 14 %  Mid Cells % 10 %      Test performed on Dec 04, 2019 08:43    Glucose 113  mg/dL(HIGH)  BUN 20 mg/dL    Creatinine 0.80 mg/dL  Creatinine Clearance (Est) 52.4500 mL/min    GFR -American > 90 mL/min  GFR Non--American 86 mL/min    BUN/Creatinine Ratio 25   Sodium 143 mmol/L    Potassium 4.6 mmol/L  Chloride 104 mmol/L    CO2 30 mmol/L  Calcium 8.9 mg/dL    Protein, Total 7.0 g/dL  Albumin 3.1 g/dL(LOW)    Globulin 3.9 g/dL  A/G Ratio 0.8 (LOW)    Bilirubin, Total 0.5 mg/dL  Alkaline Phosphatase 109 Units/L    AST (SGOT) 16 Units/L  ALT (SGPT) 35 Units/L    Anion Gap 14 mmol/L      Test performed on Nov 20, 2019 09:20    Fasting Status (Quant) 0 hrs      Test performed on Oct 07, 2019 14:30    PT 10.4 sec  INR 1.0     PTT 29 sec  Monocytes 0.9 K/mcL    Eosinophils 0.4 K/mcL  Basophils 0.1 K/mcL    Immature Granulocytes # 0.0 K/mcl  Monocyte % 9 %    Eosinophil % 4 %  Basophils % 1 %    Immature Granulocytes % 0 %  NRBC 0 /100 WBC    CEA 20.4 ng/mL(HIGH)        These labs have been reviewed with the patient and he verbalizes understanding of the results.    Pain Assessment Score: 0  Current Pain Management:N/A      Impression:   Right upper lobe large mass/Pancoast tumor 9.2 x 7.3 x 11.3 cm, status post core biopsy and pathology consistent with squamous cell carcinoma, moderately differentiated.  MRI brain 10/14/2019 negative.  MRI neck/chest/brachial plexus 10/14/2019 with 7 x 9 x 11 cm lobulated mass in anterior medial right upper and middle lobes with internal necrosis, associated lobe bronchial occlusion and possible occlusion of right upper lobe pulmonary arteries, invading adjacent pleura with no gross anterior extrapleural invasion, invading into right mediastinum, abutting and encasing of right brachiocephalic vein and left brachiocephalic vein, superior vena cava, abutting ascending aorta no clear contact with right subclavian vein and no evident venous occlusion, mild atelectasis of right upper and middle lobes with trace loculated right pleural fluid.  PET/CT  10/17/2019 with intensely FDG avid necrotic mass in the right upper hemithorax, 8 x 6.4 cm, SUV 13.12 (necrotic component anteriorly, intense FDG activity in posterior medial aspect of mass) extending up to the anterior lateral pleura and into the mediastinum medially.  No other lung lesions and no FDG avid mediastinal or axillary lymphadenopathy, no pleural or pericardial effusion, no evidence of hypermetabolic distant metastatic disease.  10/8/2019 - No PDL 1 expression (tumor proportion score less than 1%). No need for additional biomarkers testing given squamous histology and long standing history of tobacco abuse.  10/28/2019: Status post bronchoscopy/endobronchial ultrasound, FNA of subcarinal, left paratracheal and right paratracheal lymph node.  Subcarinal and left paratracheal negative for malignancy, right paratracheal lymph node unsatisfactory for evaluation.  10/29/2019: Status post initial radiation oncology consultation with Dr. Ford.  10/31/2019: Pulmonary function testing.    Given no evidence of distant metastatic disease, excellent performance status and superior sulcus tumor, recommend neoadjuvant chemoradiation x5 weeks followed by reassessment with imaging to assess for response and possible surgical eligibility.  If good response and patient eligible for surgery, will proceed with surgery.  If not a surgical candidate, plan to complete adjuvant chemoradiation, 2 cycles of consolidation therapy (carboplatin AUC 6 and paclitaxel 200 mg/m²) +/- durvalumab maintenance (given no clear benefit seen for PDL 1 expression less than 1%).      Plan for carboplatin AUC 2 and paclitaxel 50 mg/m² weekly for the duration of radiation therapy.Reviewed with patient efficacy, side effect and toxicity profile of carboplatin and paclitaxel, namely, infusion reaction, and need for premedication, nausea/vomiting and need for antinausea medication, myelosuppression and increased risk of infection, fatigue,  bleeding, peripheral neuropathy, hair loss, nail changes.    C1 D1 carboplatin and paclitaxel with radiotherapy initiated on 11/13/2019.  Patient tolerating therapy well.  Significant clinical and radiographic improvement already with concurrent chemo RT. C1 D 15 not given secondary to grade 3 neutropenia, ANC 0.9.  Counts are at goal.  Proceed with C1 D 29 carboplatin and paclitaxel.    Plan(Problem-oriented):  Proceed with C1 D 29 carboplatin AUC 2 and paclitaxel 50 mg/m² weekly with concurrent radiation therapy (C1 d15 not given grade 3 neutropenia, ANC 0.9).  RTC in 1 week.  Restaging CT chest 12/20/2019 reveals interval decrease in mass now measuring at 7 cm with necrosis (previously 10 cm). Consideration of surgical resection depending on response.  Patient's case will be discussed at thoracic tumor board conference on 12/26/2019.  Nurse assignment: [facility]  Pulmonary function testing completed.  Patient has decided to change his PCP. He is searching for a [place] speaking physician.  Contact  information for Dr.Anastasia Green provided.    Return Appointment:  in one week      Electronically signed by:   Radha Espinosa MD    cc:  MINA Arriaga M.D. Michael S. Vercillo, M.D.

## 2022-02-20 NOTE — PROGRESS NOTES
New order for PT/OT consult added this AM, Lacey explained to family to make them aware. They are in agreement to have patient assessed by therapy. Lacey made patient family aware that she may not be assessed  by PT today. Lacey called to check and PT is out of building until tomorrow morning. Spoke to SW regarding family's agreement to have PT assess patient     Paged Dr. Mahesh Chambers to make him aware.      Electronically signed by Rita Mccarty RN on 2/20/2022 at 12:18 PM

## 2022-02-20 NOTE — DISCHARGE INSTR - DIET

## 2022-02-21 ENCOUNTER — TELEPHONE (OUTPATIENT)
Dept: SURGERY | Age: 87
End: 2022-02-21

## 2022-02-21 VITALS
BODY MASS INDEX: 27.09 KG/M2 | WEIGHT: 138.01 LBS | OXYGEN SATURATION: 95 % | SYSTOLIC BLOOD PRESSURE: 152 MMHG | TEMPERATURE: 98.9 F | HEIGHT: 60 IN | RESPIRATION RATE: 16 BRPM | HEART RATE: 95 BPM | DIASTOLIC BLOOD PRESSURE: 67 MMHG

## 2022-02-21 PROCEDURE — 6370000000 HC RX 637 (ALT 250 FOR IP): Performed by: SURGERY

## 2022-02-21 PROCEDURE — 99024 POSTOP FOLLOW-UP VISIT: CPT | Performed by: PHYSICIAN ASSISTANT

## 2022-02-21 PROCEDURE — 97530 THERAPEUTIC ACTIVITIES: CPT

## 2022-02-21 PROCEDURE — 97162 PT EVAL MOD COMPLEX 30 MIN: CPT

## 2022-02-21 PROCEDURE — APPSS45 APP SPLIT SHARED TIME 31-45 MINUTES: Performed by: PHYSICIAN ASSISTANT

## 2022-02-21 PROCEDURE — APPNB45 APP NON BILLABLE 31-45 MINUTES: Performed by: PHYSICIAN ASSISTANT

## 2022-02-21 PROCEDURE — 99238 HOSP IP/OBS DSCHRG MGMT 30/<: CPT | Performed by: PHYSICIAN ASSISTANT

## 2022-02-21 PROCEDURE — 97535 SELF CARE MNGMENT TRAINING: CPT

## 2022-02-21 PROCEDURE — 6360000002 HC RX W HCPCS: Performed by: SURGERY

## 2022-02-21 PROCEDURE — 97116 GAIT TRAINING THERAPY: CPT

## 2022-02-21 PROCEDURE — 97166 OT EVAL MOD COMPLEX 45 MIN: CPT

## 2022-02-21 PROCEDURE — 2580000003 HC RX 258: Performed by: SURGERY

## 2022-02-21 RX ADMIN — ENOXAPARIN SODIUM 40 MG: 100 INJECTION SUBCUTANEOUS at 09:09

## 2022-02-21 RX ADMIN — TRIAMTERENE AND HYDROCHLOROTHIAZIDE 1 TABLET: 37.5; 25 TABLET ORAL at 09:09

## 2022-02-21 RX ADMIN — Medication 10 ML: at 09:09

## 2022-02-21 ASSESSMENT — PAIN SCALES - GENERAL
PAINLEVEL_OUTOF10: 0

## 2022-02-21 NOTE — TELEPHONE ENCOUNTER
Ella Beebe from [facility] called needing orders for home PT/OT and the Dr portion of the [facility] [place] to be filled out.     Please call: [phone] with questions

## 2022-02-21 NOTE — CARE COORDINATION
[facility]    DC order noted, all docs needed have been faxed to [facility] for home care services.     Home care to see patient within 24-48 hrs    Cally Gamboa RN, BSN CTN  [facility] [phone]

## 2022-02-21 NOTE — PROGRESS NOTES
Occupational Therapy   Occupational Therapy Initial Assessment  Date: 2022   Patient Name: Edita Ayala  MRN: [id number]     : 1933    Date of Service: 2022    Discharge Recommendations:  24 hour supervision or assist,Home with Home health OT  OT Equipment Recommendations  Equipment Needed: Yes  Mobility Devices: ADL Assistive Devices  ADL Assistive Devices: Shower Chair with back    Assessment   Performance deficits / Impairments: Decreased functional mobility ; Decreased ADL status; Decreased ROM; Decreased strength;Decreased cognition;Decreased safe awareness;Decreased endurance;Decreased balance  Assessment: Pt is an 80 y.o. female admitted with Rectal prolapse s/p Altemeier's procedure 2022 per Anna Ferreira MD. At baseline, pt lives with  and reports independent ADLs and fxl mobility using 4WW. Pt is a poor historian, oriented to person only. Pt currently functioning below baseline d/t the above deficits, today requiring min/mod A fxl transfers, CGA/min A fxl mobility with 4WW, max A/total A toileting, max A/total A LB dressing, and CGA grooming. Pt's primary complaint was chronic lexi knee pain d/t arthritis. Will cont to see on acute to address the above limitations and maximize pt's safety and independence. Recommend 24 hour hands-on assist of son and home OT at d/c. Son present during second session and practiced hands-on assist for fxl transfers/mobility. Son reports he feels comfortable providing level of assist pt is currently requiring and agreeable for home OT.    Prognosis: Fair  Decision Making: Medium Complexity  History: PMHx includes: arthritis, HTN, memory issues, hysterectomy, hernia repair  OT Education: OT Role;Plan of Care;ADL Adaptive Strategies;Transfer Training;Orientation  Barriers to Learning: cognition  REQUIRES OT FOLLOW UP: Yes  Activity Tolerance  Activity Tolerance: Patient limited by fatigue;Patient limited by pain;Treatment limited secondary to decreased cognition  Safety Devices  Safety Devices in place: Yes  Type of devices: Call light within reach; Chair alarm in place; Left in chair;Gait belt           Patient Diagnosis(es): The encounter diagnosis was Rectal prolapse.     has a past medical history of Arthritis, Forgetfulness, Hx of blood clots, Hyperlipidemia, Hypertension, and Wears glasses. has a past surgical history that includes Cholecystectomy; Hysterectomy, total abdominal; hernia repair; and Rectal prolapse repair (N/A, 2/18/2022). Restrictions  Restrictions/Precautions  Restrictions/Precautions: Fall Risk  Position Activity Restriction  Other position/activity restrictions: poor memory and B knee arthritis    Subjective   General  Chart Reviewed: Yes  Additional Pertinent Hx: Pt is an 80 y.o. female admitted with Rectal prolapse s/p Altemeier's procedure 2/18/2022 per Josh Dwyer MD. PMHx includes: arthritis, HTN, memory issues, hysterectomy, hernia repair  Family / Caregiver Present: No  Referring Practitioner: Tim Perea MD  Diagnosis: rectal prolapse  Subjective  Subjective: Pt met b/s for OT eval/tx. Pt in bed on arrival, agreeable to participate in therapy. Pt reports chronic arthritic pain lexi knees, did not rate. Pt confused, oriented to person only.     Social/Functional History  Social/Functional History  Lives With: Spouse  Type of Home: House  Home Layout: Bed/Bath upstairs,Two level (the pt reports she has a chair lift to 2nd floor)  Home Access: Stairs to enter without rails  Entrance Stairs - Number of Steps: 1+1 step without rail  Bathroom Shower/Tub: Walk-in shower  Bathroom Toilet: Handicap height  Bathroom Equipment: Grab bars in shower,Grab bars around toilet  Bathroom Accessibility: Walker accessible  Home Equipment: 4 wheeled walker  Receives Help From: Family (son is retired and assists as needed)  ADL Assistance: Independent  Homemaking Assistance:  ( does all)  Ambulation Assistance: Independent (with rollator)  Transfer Assistance: Independent  Active : No  Patient's  Info:   Additional Comments: denies recent falls       Objective   Vision: Within Functional Limits  Hearing: Within functional limits      Orientation  Overall Orientation Status: Impaired  Orientation Level: Oriented to person;Disoriented to situation;Disoriented to time;Disoriented to place     Balance  Sitting Balance: Stand by assistance  Standing Balance: Contact guard assistance  Standing Balance  Time: ~3 minutes  Activity: standing at sink for grooming  Comment: CGA  Functional Mobility  Functional - Mobility Device: 4-Wheeled Walker  Activity: To/from bathroom  Assist Level: Minimal assistance  Functional Mobility Comments: Pt completed fxl mobility to/from BR ~10 ft, ~25 ft with RW and  min A initially progressing to CGA. Pt needs VC's to manage brakes on walker and to keep walker with her in tight spaces of bathroom. ADL  Feeding: Setup  Grooming: Contact guard assistance;Setup (standing at sink to wash hands)  LE Dressing: Maximum assistance;Dependent/Total (to change briefs, don shoes)  Toileting: Dependent/Total (pt incontinent of BM in briefs, then voided additional BM/urine at toilet. Assist to manage briefs and hygiene in stance at walker)  Additional Comments: Anticipate pt is mod/max A bathing and dressing based on ROM/strength, balance, endurance. Bed mobility  Supine to Sit: Stand by assistance (HOB slightly elevated; increased time and effort needed)  Sit to Supine: Unable to assess  Scooting: Minimal assistance (to scoot to the EOB and then back in the chair)     Transfers  Sit to stand: Minimal assistance; Moderate assistance (min A from EOB, mod A from toilet)  Stand to sit: Minimal assistance  Transfer Comments: to/from 4WW, assist to manage brakes and VC's for hand placement   Toilet Transfers  Toilet - Technique: Ambulating  Equipment Used: Grab bars  Toilet Transfer:  Moderate assistance    Cognition  Overall Cognitive Status: Exceptions  Arousal/Alertness: Appropriate responses to stimuli  Following Commands: Follows one step commands with increased time; Follows one step commands with repetition  Attention Span: Attends with cues to redirect  Memory: Decreased recall of recent events;Decreased recall of precautions;Decreased recall of biographical Information;Decreased short term memory  Safety Judgement: Decreased awareness of need for safety;Decreased awareness of need for assistance  Problem Solving: Decreased awareness of errors;Assistance required to identify errors made;Assistance required to generate solutions  Insights: Decreased awareness of deficits  Initiation: Requires cues for some  Sequencing: Requires cues for some     LUE AROM (degrees)  LUE AROM : WFL  RUE AROM (degrees)  RUE AROM : Exceptions  RUE General AROM: limitations at shoulder with crepitus, elbow ext lacks end range, WFL digits     LUE Strength  Gross LUE Strength: WFL  RUE Strength  Gross RUE Strength: Exceptions to [facility] Shoulder Flex: 3+/5               OCCUPATIONAL THERAPY  Progress Note   Second Session    Patient Name: Fazal Loomis  Medical Record Number: [id number]         General  Chart Reviewed: Yes  Additional Pertinent Hx: Pt is an 80 y.o. female admitted with Rectal prolapse s/p Altemeier's procedure 2/18/2022 per Alejandro Matson MD. PMHx includes: arthritis, HTN, memory issues, hysterectomy, hernia repair  Family / Caregiver Present: No  Referring Practitioner: Chico Romano MD  Diagnosis: rectal prolapse     Restrictions/Precautions  Restrictions/Precautions: Fall Risk        Position Activity Restriction  Other position/activity restrictions: poor memory and B knee arthritis    Subjective: Notified by social work that son requesting for therapy to return for son to observe fxl transfers/mobility to determine if he will be able to assist pt at home.  Pt found to be seated in recliner on arrival, agreeable to participate in therapy. Pt anxious and expressing fear of falling, frequently states \"I can't do it. \"     Objective:  ADLs  LB dressing: Max A (to change briefs, assist to thread B LE's. Pt assisted with managing briefs up over hips)  Toileting: max A (pt incontinent of small amount of BM in briefs, then attempted to void additional BM/urine at toilet. Pt assisted with parts of clothing management, but needed assist for hygiene. Son reports pt's  will assist with hygiene at home. Educated son that he will have to provide hands-on assist for balance while pt's spouse assists with clothing management/hygiene and son verbalized understanding.)    Fxl Transfers:  Sit to Stand:  Mod A  Stand to Sit: Min A    Balance:  Sitting balance: SBA (seated on toilet)  Standing balance: CGA (at 4WW and grab bar)  Fxl Mobility: CGA (pt completed fxl mobility ~35 ft, ~25 ft with 4WW and CGA)    Assessment: See above    Safety Device - Type of devices:  []  All fall risk precautions in place [] Bed alarm in place  [x] Call light within reach [x] Chair alarm in place [] Positioning belt [x] Gait belt [] Patient at risk for falls [] Left in bed [x] Left in chair [] Telesitter in use [] Sitter present [] Nurse notified []  None      Therapy Time   Individual Co-treatment   Time In 3661     Time Out 1105     Minutes 26       Electronically signed by Kendal Turner OT on 2/21/2022 at [facility] [place] per week: 3-5  Current Treatment Recommendations: Strengthening,Balance Training,ROM,Endurance Training,Safety Education & Training,Self-Care / ADL,Functional Mobility Training,Cognitive Reorientation,Cognitive/Perceptual Training,Equipment Evaluation, Education, & procurement,Patient/Caregiver Education & Training    AM-PAC Score        AM-PAC Inpatient Daily Activity Raw Score: 15 (02/21/22 0836)  AM-PAC Inpatient ADL T-Scale Score : 34.69 (02/21/22 0836)  ADL Inpatient CMS 0-100% Score: 56.46 (02/21/22 8384)  ADL Inpatient CMS G-Code Modifier : CK (02/21/22 0836)    Goals  Short term goals  Time Frame for Short term goals: Prior to d/c:  Short term goal 1: Pt will dress with min A. Short term goal 2: Pt will bathe with min A. Short term goal 3: Pt will toilet with SBA. Short term goal 4: Pt will complete fxl mobility and fxl transfers to/from ADL surfaces with SBA using AD. Short term goal 5: Pt will tolerate standing >5 minutes for functional task with SBA to improve standing tolerance for ADL routine. Long term goals  Time Frame for Long term goals : STGs=LTGs  Patient Goals   Patient goals : pt unable to verbalize personal therapy goal d/t decreased cognition. per chart, family's goal is for pt to return home. Therapy Time   Individual Concurrent Group Co-treatment   Time In 0740         Time Out 0890         Minutes 55         Timed Code Treatment Minutes: 40 Minutes     This note to serve as OT d/c summary if pt is d/c-ed prior to next therapy session.     Timothy Vora, OTR/L [id number]

## 2022-02-21 NOTE — CARE COORDINATION
[facility]    Referral received from  to follow for home care services. I will follow for needs, and speak with patient to verify demos.         Jose Haro RN, BSN CTN  Formerly [facility], [facility] [phone]

## 2022-02-21 NOTE — PLAN OF CARE
Problem: Falls - Risk of:  Goal: Will remain free from falls  Description: Will remain free from falls  2/21/2022 1027 by Zulma Perera RN  Outcome: Ongoing  2/21/2022 0018 by Aundrea Jade RN  Outcome: Ongoing  Goal: Absence of physical injury  Description: Absence of physical injury  2/21/2022 1027 by Zulma Perera RN  Outcome: Ongoing  2/21/2022 0018 by Aundrea Jade RN  Outcome: Ongoing     Problem: Skin Integrity:  Goal: Will show no infection signs and symptoms  Description: Will show no infection signs and symptoms  2/21/2022 1027 by Zulma Perera RN  Outcome: Ongoing  2/21/2022 0018 by Aundrea Jade RN  Outcome: Ongoing  Goal: Absence of new skin breakdown  Description: Absence of new skin breakdown  2/21/2022 1027 by Zulma Perera RN  Outcome: Ongoing  2/21/2022 0018 by Aundrea Jade RN  Outcome: Ongoing     Problem: Pain:  Goal: Pain level will decrease  Description: Pain level will decrease  2/21/2022 1027 by Zulma Perera RN  Outcome: Ongoing  2/21/2022 0018 by Aundrea Jade RN  Outcome: Ongoing  Goal: Control of acute pain  Description: Control of acute pain  2/21/2022 1027 by Zulma Perera RN  Outcome: Ongoing  2/21/2022 0018 by Aundrea Jade RN  Outcome: Ongoing  Goal: Control of chronic pain  Description: Control of chronic pain  2/21/2022 1027 by Zulma Perera RN  Outcome: Ongoing  2/21/2022 0018 by Aundrea Jade RN  Outcome: Ongoing

## 2022-02-21 NOTE — DISCHARGE INSTR - COC
Continuity of Care Form    Patient Name: Angi Case   :  1933  MRN:  [id number]    Admit date:  2022  Discharge date:  2022    Code Status Order: Prior   Advance Directives:   [facility] Documentation       Date/Time Healthcare Directive Type of Healthcare Directive Copy in [address] Agent's Name Healthcare Agent's Phone Number    [phone] Yes, patient has an advance directive for healthcare treatment Durable power of  for health care;Living will Yes, copy in chart Healthcare power of  -- --            Admitting Physician:  Nayla Albarado MD  PCP: Maria Jamil MD    Discharging Nurse: Michelle Zayas [place] Unit/Room#: W7M-1082/0212-23  Discharging Unit Phone Number: [phone]    Emergency Contact:   Extended Emergency Contact Information  Primary Emergency Contact: Allen Kathleen  Address: [facility] Phone: [phone]  Relation: Spouse  Secondary Emergency Contact: [facility] Phone: [phone]  Relation: Child    Past Surgical History:  Past Surgical History:   Procedure Laterality Date    CHOLECYSTECTOMY      HERNIA REPAIR      hiatal hernia    HYSTERECTOMY, TOTAL ABDOMINAL      complete    RECTAL PROLAPSE REPAIR N/A 2022    TRANSPERINEAL RECTOSIGMOIDECTOMY (ALTEMEIER PROCEDURE), FLEXIBLE SIGMOIDOSCOPY performed by Nayla Albarado MD at [place] [address]       Immunization History: There is no immunization history on file for this patient.     Active Problems:  Patient Active Problem List   Diagnosis Code    Rectal prolapse K62.3       Isolation/Infection:   Isolation            No Isolation          Patient Infection Status       None to display            Nurse Assessment:  Last Vital Signs: BP (!) 147/56   Pulse 87   Temp 98.9 °F (37.2 °C) (Oral)   Resp 18   Ht 5' (1.524 m)   Wt 138 lb 0.1 oz (62.6 kg)   SpO2 93%   BMI 26.95 kg/m²     Last documented pain score (0-10 scale): Pain Level: 0  Last Weight:   Wt Readings from Last 1 Encounters:   02/21/22 138 lb 0.1 oz (62.6 kg)     Mental Status:  alert, coherent, logical, thought processes intact, able to concentrate and follow conversation, and Forgetful    IV Access:  - None    Nursing Mobility/ADLs:  Walking   Assisted  Transfer  Assisted  Bathing  Assisted  Dressing  Assisted  Toileting  Assisted  Feeding  Independent  Med [address]   Assisted  Med Delivery   whole    Wound Care Documentation and Therapy:        Elimination:  Continence: Bowel: Yes  Bladder: Yes  Urinary Catheter: None   Colostomy/Ileostomy/Ileal Conduit: No       Date of Last BM: 2/20/2022    Intake/Output Summary (Last 24 hours) at 2/21/2022 1032  Last data filed at 2/21/2022 2740  Gross per 24 hour   Intake 640 ml   Output 800 ml   Net -160 ml     I/O last 3 completed shifts: In: 640 [P.O.:240; IV Piggyback:400]  Out: 300 [Urine:300]    Safety Concerns:     History of Falls (last 30 days), At Risk for Falls, and Forgetful    Impairments/Disabilities:      None    Nutrition Therapy:  Current Nutrition Therapy:   - Oral Diet:  General    Routes of Feeding: Oral  Liquids: Thin Liquids  Daily Fluid Restriction: no  Last Modified Barium Swallow with Video (Video Swallowing Test): not done    Treatments at the Time of Hospital Discharge:   Respiratory Treatments: N/a  Oxygen Therapy:  is not on home oxygen therapy.   Ventilator:    - No ventilator support    Rehab Therapies: Physical Therapy, Occupational Therapy, nurse  Weight Bearing Status/Restrictions: No weight bearing restirctions  Other Medical Equipment (for information only, NOT a DME order):  walker  Other Treatments: HOME HEALTH CARE: LEVEL 1 [address] [place] [address] to establish plan of care for patient over 60 day period   Nursing  Initial home SN evaluation visit to occur within 24-48 hours for:  medication management  VS and clinical assessment  S&S chronic disease exacerbation education + when to contact MD / NP  care coordination  Medication Reconciliation during 1st SN visit       PT/OT   Evaluate with goal of regaining prior level of functioning   OT to evaluate if patient has [address] needs for personal care      PCP Visit scheduled within 7 days of hospital discharge       Patient's personal belongings (please select all that are sent with patient): With patient/family    RN SIGNATURE:  Electronically signed by Christopher Lovell RN on 2/21/22 at 10:39 AM EST    CASE MANAGEMENT/SOCIAL WORK SECTION    Inpatient Status Date: 02/21/2022    Readmission Risk Assessment Score:  Readmission Risk              Risk of Unplanned Readmission:  7         Discharging to Facility/ Agency   Name:  [facility] care    Address: [address] [place] [address], [address] [place] [address]., [place] [address]  Phone: [phone]  Fax: [phone]         / signature: Electronically signed by Alec Pang on 2/21/2022 at 10:49 AM      PHYSICIAN SECTION    Prognosis: Good    Condition at Discharge: Stable    Physician Certification: I certify the above information and transfer of Zofia Loiaza  is necessary for the continuing treatment of the diagnosis listed and that she requires Home Care for less 30 days.      Update Admission H&P: No change in H&P    PHYSICIAN SIGNATURE:  Electronically signed by Romi Francis PA-C on 2/21/22 at 11:22 AM EST

## 2022-02-21 NOTE — TELEPHONE ENCOUNTER
Spoke to [address] [place] [address] [employer] him that per Dr. Thayne Hashimoto the orders need to be signed by Dr. Dennis Neely for PT/OT and SUE.

## 2022-02-21 NOTE — CARE COORDINATION
DISCHARGE PLAN: Referral to [facility]. Family plans to transport pt home at d/c. Son will stay with pt and spouse to assist with care.     ___________________________________  INITIAL ASSESSMENT  Met w/pt and pts son Candance Graves to address barriers to dc. Pts spouse was also on the phone to assist with d/c planning. HOME: Pt resides in a two story home with her spouse. There is 1+1 GAGANDEEP the home. There is a stair lift to the second floor of the home. DME/O2: Stairlift, grab bars in the shower, grab bars around the toilet. Candance Graves stated that he has looked for a shower chair, but the shower is too small for a shower chair to fit. Pt also has a 4 wheeled walker and rollator. ACTIVE SERVICES: Pt has had [facility] services in the past and would like to have this [facility] [employer] service again. Candance Graves stated that pt had Faby Truong as a therapist and they would like to again request this therapist.   The Plan for Transition of Care is related to the following treatment goals: Getting back to pts baseline. The Patient and pts son were provided with a choice of provider and agrees   with the discharge plan. [x] Yes [] No    Freedom of choice list was provided with basic dialogue that supports the patient's individualized plan of care/goals, treatment preferences and shares the quality data associated with the providers. [x] Yes [] No    Referral called to Andre Leung with [facility]     TRANSPORTATION: Pt is not an active  and requires assistance from her spouse or family. Family to transport pt home. PHARMACY: Denies difficulty obtaining/taking meds. Pt has all scripts filled at [facility] in [facility].      PCP: Charna Mcburney    DEMOGRAPHICS: Verified address/phone number as correct    INSURANCE:  [facility] Medicare  PRESCRIPTION COVERAGE: [facility] Medicare    HD/PD: No    THERAPY RECS    PHYSICAL THERAPY  \"Discharge Recommendations:  24 hour supervision or assist,Patient would benefit from continued therapy after discharge,S Level 1,Home with Home health PT \"    OCCUPATIONAL THERAPY  \"Date of Service: 2/21/2022     Discharge Recommendations:  24 hour supervision or assist,Home with Home health OT  OT Equipment Recommendations  Equipment Needed: Yes  Mobility Devices: ADL Assistive Devices  ADL Assistive Devices: Shower Chair with back\"    Discharge planning team will remain available for needs. Please consult for any specifics not addressed in this note.     Renzo Alanis [place]  [phone]  Electronically signed by Mariano Alves on 2/21/2022 at 10:48 AM

## 2022-02-21 NOTE — PROGRESS NOTES
General Surgery  Daily Progress Note    Pt Name: Zofia Loaiza  Medical Record Number: [id number]  Date of Birth 6/21/1933   Today's Date: 2/21/2022    No chief complaint on file. ASSESSMENT/PLAN  1. Rectal prolapse s/p Altemeier POD #3 - doing well. 2. Tolerating regular diet. 3. Therapy recommends therapy at home upon discharge  4. Post operative hypoxia - resolved, off oxygen  5. Ambulate/OOB. Sitting up in chair at visit  6. OK to D/C home with home care; PT      [place] has improved from yesterday. Denies any abdominal pain. Tolerating diet. OBJECTIVE  VITALS:  height is 5' (1.524 m) and weight is 138 lb 0.1 oz (62.6 kg). Her oral temperature is 98.9 °F (37.2 °C). Her blood pressure is 147/56 (abnormal) and her pulse is 87. Her respiration is 18 and oxygen saturation is 93%. GENERAL: alert, no distress  LUNGS: normal respiratory effort, no accessory muscle use  ABDOMEN: soft, NT, ND  EXTREMITY: no cyanosis and no clubbing  I/O last 3 completed shifts: In: 640 [P.O.:240; IV Piggyback:400]  Out: 300 [Urine:300]  I/O this shift:  In: -   Out: [address]     02/19/22  0436 02/19/22  0436 02/20/22  0437 02/20/22  0438   WBC 11.4*   < >  --  10.5   HGB 10.4*   < >  --  10.6*   HCT 31.0*   < >  --  31.3*      < >  --  209      < > 138  --    K 2.9*   < > 3.7  --       < > 102  --    CO2 23   < > 23  --    BUN 15   < > 13  --    CREATININE 0.8   < > 0.8  --    MG 1.60*  --   --   --    CALCIUM 8.4   < > 8.3  --     < > = values in this interval not displayed.      CBC with Differential:    Lab Results   Component Value Date    WBC 10.5 02/20/2022    RBC 3.76 02/20/2022    HGB 10.6 02/20/2022    HCT 31.3 02/20/2022     02/20/2022    MCV 83.2 02/20/2022    MCH 28.1 02/20/2022    MCHC 33.8 02/20/2022    RDW 14.7 02/20/2022    LYMPHOPCT 8.1 02/20/2022    MONOPCT 8.4 02/20/2022    BASOPCT 0.4 02/20/2022    MONOSABS 0.9 02/20/2022 LYMPHSABS 0.9 02/20/2022    EOSABS 0.2 02/20/2022    BASOSABS 0.0 02/20/2022     BMP:    Lab Results   Component Value Date     02/20/2022    K 3.7 02/20/2022     02/20/2022    CO2 23 02/20/2022    BUN 13 02/20/2022    CREATININE 0.8 02/20/2022    CALCIUM 8.3 02/20/2022    GFRAA >60 02/20/2022    LABGLOM >60 02/20/2022    GLUCOSE 128 02/20/2022     Hepatic Function Panel:  No results found for: ALKPHOS, ALT, AST, PROT, BILITOT, BILIDIR, IBILI, LABALBU      Boubacar Gann PA-C  Electronically signed 2/21/2022 at 11:22 AM

## 2022-02-21 NOTE — PROGRESS NOTES
All discharge paperwork and education provided to family and patient. Pt did not have IV; not on tele. PCA assisted pt with getting dressed. All belongings gathered and taken, including floor bicycle leg exerciser and rollator. Pt wheeled down to family vehicle and assisted with transfer. No issues.

## 2022-02-21 NOTE — PLAN OF CARE
Problem: Falls - Risk of:  Goal: Will remain free from falls  Description: Will remain free from falls  2/21/2022 0018 by Farzaneh Montanez RN  Outcome: Ongoing  Goal: Absence of physical injury  Description: Absence of physical injury  2/21/2022 0018 by Farzaneh Montanez RN  Outcome: Ongoing     Problem: Skin Integrity:  Goal: Will show no infection signs and symptoms  Description: Will show no infection signs and symptoms  2/21/2022 0018 by Farzaneh Montanez RN  Outcome: Ongoing  Goal: Absence of new skin breakdown  Description: Absence of new skin breakdown  2/21/2022 0018 by Farzaneh Montanez RN  Outcome: Ongoing    Problem: Pain:  Goal: Pain level will decrease  Description: Pain level will decrease  2/21/2022 0018 by Farzaneh Montanez RN  Outcome: Ongoing  Goal: Control of acute pain  Description: Control of acute pain  2/21/2022 0018 by Farzaneh Montanez RN  Outcome: Ongoing  Goal: Control of chronic pain  Description: Control of chronic pain  2/21/2022 0018 by Farzaneh Montanez RN  Outcome: Ongoing

## 2022-02-21 NOTE — PROGRESS NOTES
Physical Therapy    Facility/Department: [address] MED SURG  Initial Assessment and 2nd session  If patient discharges prior to next session this note will serve as a discharge summary. Please see below for the latest assessment towards goals. NAME: Maggie Whitehead  : 1933  MRN: [id number]    Date of Service: 2022    Discharge Recommendations:  24 hour supervision or assist,Patient would benefit from continued therapy after discharge,S Level 1,Home with Home health PT   Martell Severin [facility] scored a 17/24 on the AM-PAC short mobility form. Current research shows that an AM-PAC score of 18 or greater is typically associated with a discharge to the patient's home setting. Based on the patient's AM-PAC score and their current functional mobility deficits, it is recommended that the patient have 2-3 sessions per week of Physical Therapy at d/c to increase the patient's independence. At this time, this patient demonstrates the endurance and safety to discharge home with 24/7 hands-on assist and home PT and a follow up treatment frequency of 2-3x/wk. Please see assessment section for further patient specific details. PT Equipment Recommendations  Equipment Needed: No    Assessment   Assessment: The pt is an 79 yo female who presented to the MD office with a rectal prolapse. She was admitted for surgery on 2022: Perineal approach proctosigmoidectomy withcoloanal anastomosis (Altemeier's procedure). The pt lives with her elderly  in a house that has a stair lift to the 2nd floor bed and bath. The pt uses a rollator and reports she has been indep in self-care PTA. She dies report her son assists with whatever she needs but does not live with them. The pt does have memeory issues and validity of info is questionable.        PMHx: dementia, HTN, arth B knees, hernia repair, choly, hyst, h/o of DVT       Today, the pt demonstrated that she is functioning below her reported baseline and is limited by her memory, painful B knees and strength. The pt required at least mod A for standing from the commode and at least min A for ambulation short distances with the rollator. Anticipate that the pt will need 24/7 hands on assist at home with all mobility and self-care tasks. Recommend that her son stays with her and her  24/7 initially and provides assist. The pt would benefit from home PT to address her deficits and progress her back to her functional baseline. Prognosis: Good  Decision Making: Medium Complexity  PT Education: PT Role;General Safety;Orientation;Transfer Training; Adaptive Device Training  Barriers to Learning: memory  REQUIRES PT FOLLOW UP: Yes  Activity Tolerance  Activity Tolerance: Patient Tolerated treatment well;Patient limited by pain       Patient Diagnosis(es): The encounter diagnosis was Rectal prolapse.     has a past medical history of Arthritis, Forgetfulness, Hx of blood clots, Hyperlipidemia, Hypertension, and Wears glasses. has a past surgical history that includes Cholecystectomy; Hysterectomy, total abdominal; hernia repair; and Rectal prolapse repair (N/A, 2/18/2022). Restrictions  Restrictions/Precautions  Restrictions/Precautions: Fall Risk  Position Activity Restriction  Other position/activity restrictions: poor memory and B knee arthritis  Vision/Hearing  Vision: Within Functional Limits  Hearing: Within functional limits     Subjective  General  Chart Reviewed: Yes  Additional Pertinent Hx: Per op report on 2- of Renato Flores MD: The pt is an 79 yo female who presented to the MD office with a rectal prolapse.  She was admitted for surgery on 2-: Perineal approach proctosigmoidectomy withcoloanal anastomosis (Altemeier's procedure)      PMHx: dementia, HTN, arth B knees, hernia repair, choly, hyst, h/o of DVT  Response To Previous Treatment: Not applicable  Family / Caregiver Present: No  Referring Practitioner: Gema Landry MD  Referral Date : 02/20/22  Diagnosis: rectal prolapse  Follows Commands: Within Functional Limits  Subjective  Subjective: the pt was found to be awake in the bed; she is pleasant but confused; she is able to follow directions and only reports pain in her B knees from arthritis, did not rate  Pain Screening  Patient Currently in Pain: No          Orientation  Orientation  Overall Orientation Status: Impaired  Orientation Level: Oriented to person;Disoriented to time;Disoriented to place; Disoriented to situation  Social/Functional History  Social/Functional History  Lives With: Spouse  Type of Home: House  Home Layout: Bed/Bath upstairs,Two level (the pt reports she has a chair lift to 2nd floor)  Home Access: Stairs to enter without rails  Entrance Stairs - Number of Steps: 1+1 step without rail  Bathroom Shower/Tub: Walk-in shower  Bathroom Toilet: Handicap height  Bathroom Equipment: Grab bars in shower,Grab bars around toilet  Bathroom Accessibility: Walker accessible  Home Equipment: 4 wheeled walker  Receives Help From: Family (son is retired and assists as needed)  ADL Assistance: Independent  Homemaking Assistance:  ( does all)  Ambulation Assistance: Independent (with rollator)  Transfer Assistance: Independent  Active : No  Patient's  Info:   Additional Comments: denies recent falls  Cognition   Cognition  Overall Cognitive Status: Exceptions  Arousal/Alertness: Appropriate responses to stimuli  Following Commands: Follows one step commands with increased time; Follows one step commands with repetition  Attention Span: Attends with cues to redirect  Memory: Decreased recall of recent events;Decreased recall of precautions;Decreased recall of biographical Information;Decreased short term memory  Safety Judgement: Decreased awareness of need for safety;Decreased awareness of need for assistance  Problem Solving: Decreased awareness of errors;Assistance required to identify errors made;Assistance required to generate solutions  Insights: Decreased awareness of deficits  Initiation: Requires cues for some  Sequencing: Requires cues for some    Objective  AROM RLE (degrees)  RLE AROM: WFL  AROM LLE (degrees)  LLE AROM : WFL  Strength RLE  Comment: painful knees but funtionally at least 3+/5  Strength LLE  Comment: painful knees but funtionally at least 3+/5        Bed mobility  Supine to Sit: Stand by assistance (HOB slightly elevated; increased time and effort needed)  Sit to Supine: Unable to assess  Scooting: Minimal assistance (to scoot to the EOB and then back in the chair)  Transfers  Sit to Stand: Minimal Assistance; Moderate Assistance (min A from the EOB and mod A from the commode)  Stand to sit: Minimal Assistance (poor technique; tends to let go of the walker; needs max cues to manage the transfer safely)  Ambulation  Ambulation?: Yes  Ambulation 1  Surface: level tile  Device: Rollator  Assistance: Contact guard assistance;Minimal assistance  Quality of Gait: initially very difficut initiaing steps due to B knee pain; short, choppy steps and at least min A to manage the rollator; 2nd walk was slightly better and was able to take slightly bigger steps  Distance: 10 feet x 1, 25 feet x 1  Comments: the pt used the commode during session and completed hand hygiene at the sink; the pt had a BM in her depends and was dependent for clean up and depends change     Balance  Sitting - Static: Good  Sitting - Dynamic: Good;-  Standing - Static: Fair;+ (at the sink)  Standing - Dynamic: Fair (with rollator)  Comments: the pt stood at the sink x 2-3 minutes to wash hands needing at least CGA for safety  2nd session (family education with the pt's son)  Time in: 3837  Time out 1105  Treatment time: 26 minutes  Therapy was asked to see the pt again with son in attendance for family education. The pt was found to be still up in the chair and working on a LE restorator with her son.  The pt's son was agreeable to assisting his mother with transfers and ambulation with the rollator in the room. He demonstrated that he was able to assist his mother with transfers from the recliner and from the commode which are now requiring at least mod A. The pt was able to walk with her rollator in the room setting ~ 35 feet and then again 25 feet with CGA and the pt's son was educated that he needed to have hands-on at all times when ambulating. He did report he has a gait belt at home and was recommended to use it. The pt was fearful with all mobility initially and reports B knee pain. Per son, he is able to provide 24/7 assist as needed and now is agreeable to home PT. Anticipate that if the pt's son is willing to provide the increased level of care that the pt requires then she will be safe for home with home PT and hands-on assist.   Electronically signed by Mariella Del Real, PT [id number] on 2/21/2022 at 11:06 AM    Plan   Plan  Times per week: 3-5x/week  Current Treatment Recommendations: Functional Mobility Training,Transfer Training,Gait Training,Stair training,Safety Education & Training,Patient/Caregiver Education & Training  Safety Devices  Type of devices: Call light within reach,Chair alarm in place,Gait belt,Patient at risk for falls,Left in chair,Nurse notified      AM-PAC Score  AM-PAC Inpatient Mobility Raw Score : 17 (02/21/22 0827)  AM-PAC Inpatient T-Scale Score : 42.13 (02/21/22 0827)  Mobility Inpatient CMS 0-100% Score: 50.57 (02/21/22 0827)  Mobility Inpatient CMS G-Code Modifier : CK (02/21/22 0827)          Goals  Short term goals  Time Frame for Short term goals: upon d/c  Short term goal 1: Bed mobility with CGA/SBA. Short term goal 2: Transfers sit <> stand with CGA/SBA. Short term goal 3: Ambulate with rollator 50 feet with CGA/SBA. Short term goal 4: Negotiate platform step with rolator with min A.   Patient Goals   Patient goals : none stated       Therapy Time   Individual Concurrent Group Co-treatment   Time In 0740         Time Out 0835         Minutes 55         Timed Code Treatment Minutes: 40 Minutes       Electronically signed by Susy Shelton, PT [id number] on 2/21/2022 at 8:38 AM

## 2022-03-10 ENCOUNTER — OFFICE VISIT (OUTPATIENT)
Dept: SURGERY | Age: 87
End: 2022-03-10

## 2022-03-10 VITALS
OXYGEN SATURATION: 99 % | BODY MASS INDEX: 26.95 KG/M2 | HEIGHT: 60 IN | DIASTOLIC BLOOD PRESSURE: 61 MMHG | HEART RATE: 74 BPM | SYSTOLIC BLOOD PRESSURE: 103 MMHG | TEMPERATURE: 97.3 F

## 2022-03-10 DIAGNOSIS — K62.3 RECTAL PROLAPSE: Primary | ICD-10-CM

## 2022-03-10 PROCEDURE — 99024 POSTOP FOLLOW-UP VISIT: CPT | Performed by: SURGERY

## 2022-03-15 NOTE — DISCHARGE SUMMARY
General Surgery Discharge Summary        Fazal Loomis   : 1933 MRN: [id number]  Date of Admission: 2022  Date of Discharge: 2022  Admitting Physician:Tre James MD  Primary Care Physician: See Turk MD  Summary by: Yanick Gerber PA-C    Diagnosis Present on Admission:   Rectal prolapse      Secondary diagnosis:   Patient Active Problem List   Diagnosis    Rectal prolapse     Procedures: Procedure(s):  TRANSPERINEAL RECTOSIGMOIDECTOMY (ALTEMEIER PROCEDURE), FLEXIBLE SIGMOIDOSCOPY    Summary of hospital stay:   Fazal Loomis is a 80 y.o. female who presented to the Hospital to have her rectal prolapse fixed. All the risk and benefits were discussed with the patient and her  and son and they agreed to proceed. She was taken to the OR where a perineal approach proctosigmoidectomy with coloanal anastomosis was carried out. She tolerated the procedure well and was transferred to recovery in stable condition. She did have some post operative hypoxia for the first 24 hrs. Her oxygen levels were monitored at that time and she was easily able to be weaned off. She tolerated her diet and her pain was monitored and controlled. She did see PT and it was recommended that she continued therapy at home so home care was arranged. She did well throughout her hospital stay and was discharged home with home care in stable condition.    Discharge Medications:  Discharge Medication List as of 2022  1:58 PM      START taking these medications    Details   docusate sodium (COLACE) 100 MG capsule Take 1 capsule by mouth 2 times daily as needed for Constipation (take while on narcotic pain medication), Disp-60 capsule, R-0Normal         CONTINUE these medications which have NOT CHANGED    Details   calcium carbonate (OSCAL) 500 MG TABS tablet Take 400 mg by mouth 2 times dailyHistorical Med      atorvastatin (LIPITOR) 20 MG tablet Take 20 mg by mouth at bedtime Historical Med triamterene-hydroCHLOROthiazide (MAXZIDE-25) 37.5-25 MG per tablet Take 1 tablet by mouth daily Historical Med      amLODIPine (NORVASC) 5 MG tablet Take 5 mg by mouth nightly Historical Med         STOP taking these medications       metroNIDAZOLE (FLAGYL) 500 MG tablet Comments:   Reason for Stopping:         neomycin (MYCIFRADIN) 500 MG tablet Comments:   Reason for Stopping:               Discharged to:  Home with home care    Instruction:  The patient is instructed to return to the hospital or call the office for fevers > 101.5F, inability to tolerate a diet, or unexpected pain. Surgery specific discharge instruction sheet was provided to the patient.   Diet: regular diet   Activity: activity as tolerated    Follow up:  Dr. Katelyn Morton in 1-2  weeks    Electronically signed by Alisia Hayes PA-C on 3/15/2022 at 11:35 AM

## 2022-06-23 ENCOUNTER — OFFICE VISIT (OUTPATIENT)
Dept: SURGERY | Age: 87
End: 2022-06-23
Payer: MEDICARE

## 2022-06-23 VITALS
HEART RATE: 80 BPM | OXYGEN SATURATION: 98 % | BODY MASS INDEX: 26.95 KG/M2 | RESPIRATION RATE: 16 BRPM | HEIGHT: 60 IN | DIASTOLIC BLOOD PRESSURE: 65 MMHG | SYSTOLIC BLOOD PRESSURE: 137 MMHG | TEMPERATURE: 98 F

## 2022-06-23 DIAGNOSIS — F03.90 DEMENTIA WITHOUT BEHAVIORAL DISTURBANCE, UNSPECIFIED DEMENTIA TYPE: ICD-10-CM

## 2022-06-23 DIAGNOSIS — K62.3 RECTAL PROLAPSE: Primary | ICD-10-CM

## 2022-06-23 PROCEDURE — 1090F PRES/ABSN URINE INCON ASSESS: CPT | Performed by: SURGERY

## 2022-06-23 PROCEDURE — 1036F TOBACCO NON-USER: CPT | Performed by: SURGERY

## 2022-06-23 PROCEDURE — G8427 DOCREV CUR MEDS BY ELIG CLIN: HCPCS | Performed by: SURGERY

## 2022-06-23 PROCEDURE — 1123F ACP DISCUSS/DSCN MKR DOCD: CPT | Performed by: SURGERY

## 2022-06-23 PROCEDURE — 99214 OFFICE O/P EST MOD 30 MIN: CPT | Performed by: SURGERY

## 2022-06-23 PROCEDURE — G8417 CALC BMI ABV UP PARAM F/U: HCPCS | Performed by: SURGERY

## 2022-06-23 RX ORDER — CHOLESTYRAMINE LIGHT 4 G/5.7G
4 POWDER, FOR SUSPENSION ORAL 2 TIMES DAILY
COMMUNITY

## 2022-06-23 NOTE — PROGRESS NOTES
[facility] PHYSICIANS [place] [facility]  [address] [facility]  [address] [address]  Dept: [phone]  Dept Fax: [phone]: [phone]    Visit Date: 6/23/2022    Sue Dee is a 80 y.o. female who presents today for: Follow-up (3 month rectal prolapse-patient still has leakage or episoodes of incontinence, has recently started Prevalite)      HPI:       Sue Dee is a 80 y.o. female well-known to me after rectal prolapse repair 4 months ago. She is here today with her  and son as she is still having issues with fecal control. Has a fair bit of urgency as well as decreased sensation.     Past Medical History:   Diagnosis Date    Arthritis     bilateral knee    Forgetfulness     Hx of blood clots 2020    DVT    Hyperlipidemia     Hypertension     Wears glasses     reading     Past Surgical History:   Procedure Laterality Date    CHOLECYSTECTOMY      HERNIA REPAIR      hiatal hernia    HYSTERECTOMY, TOTAL ABDOMINAL (CERVIX REMOVED)      complete    RECTAL PROLAPSE REPAIR N/A 2/18/2022    TRANSPERINEAL RECTOSIGMOIDECTOMY (ALTEMEIER PROCEDURE), FLEXIBLE SIGMOIDOSCOPY performed by Ladarius Markham MD at [place] [address]       Current Outpatient Medications:     cholestyramine light (PREVALITE) 4 g packet, Take 4 g by mouth 2 times daily, Disp: , Rfl:     docusate sodium (COLACE) 100 MG capsule, Take 1 capsule by mouth 2 times daily as needed for Constipation (take while on narcotic pain medication), Disp: 60 capsule, Rfl: 0    calcium carbonate (OSCAL) 500 MG TABS tablet, Take 400 mg by mouth 2 times daily, Disp: , Rfl:     atorvastatin (LIPITOR) 20 MG tablet, Take 20 mg by mouth at bedtime , Disp: , Rfl:     triamterene-hydroCHLOROthiazide (MAXZIDE-25) 37.5-25 MG per tablet, Take 1 tablet by mouth daily , Disp: , Rfl:     amLODIPine (NORVASC) 5 MG tablet, Take 5 mg by mouth nightly , Disp: , Rfl:   No Known Allergies  Past Surgical History:   Procedure Laterality Date    CHOLECYSTECTOMY      HERNIA REPAIR      hiatal hernia    HYSTERECTOMY, TOTAL ABDOMINAL (CERVIX REMOVED)      complete    RECTAL PROLAPSE REPAIR N/A 2/18/2022    TRANSPERINEAL RECTOSIGMOIDECTOMY (ALTEMEIER PROCEDURE), FLEXIBLE SIGMOIDOSCOPY performed by Aniket Pickens MD at [facility] History   Problem Relation Age of Onset    Dementia Mother     Heart Disease Father     Depression Father        Social History:   Social History     Tobacco Use    Smoking status: Never Smoker    Smokeless tobacco: Never Used   Substance Use Topics    Alcohol use: Yes     Comment: socially--wine      Tobacco cessation counseling provided as appropriate. REVIEW OF SYSTEMS:    Pertinent positives and negatives are mentioned in the HPI. Otherwise, all other systems were reviewed and negative. Objective:     Physical Exam   /65   Pulse 80   Temp 98 °F (36.7 °C) (Oral)   Resp 16   Ht 5' (1.524 m)   SpO2 98%   BMI 26.95 kg/m²   Constitutional: Appears well-developed and well-nourished. Grooming appropriate. No gross deformities. Body mass index is 26.95 kg/m². Eyes: No scleral icterus. Conjunctiva/lids normal. Vision intact grossly. Pupils equal/symmetric, reactive bilaterally. ENT: External ears/nose without defect, scars, or masses. Hearing grossly intact. No facial deformity. Lips normal, normal dentition. Neck: No masses. Trachea midline. No crepitus. Thyroid not enlarged. Cardiovascular: Normal rate. No peripheral edema. Abdominal aorta normal size to palpation. Pulmonary/Chest: Effort normal. No respiratory distress. No wheezes. No use of accessory muscles. Musculoskeletal: Normal range of motion of head/neck, without deformity, pain, or crepitus, with normal strength and tone. Normal gait. Nails without clubbing or cyanosis. Neurological: Alert and oriented to person, place, and time. No gross deficits.  Sensation intact. Skin: Skin is dry. No rashes noted. No pallor. No induration of nodules. Psychiatric: Normal mood and affect. Behavior normal. Oriented to person, place, and time. Judgment and insight reasonable. Abdominal/wound: Soft, nontender, nondistended    Anorectal Exam: Chaperone present in room throughout exam.  Patient placed in the left lateral position. Buttocks spread. Digital rectal exam performed with lubricated finger. Anoscopy performed. Findings reveal: Intact colon anastomosis    Labs reviewed: None     Imaging reviewed: None    Assessment/Plan:       A/P:  New problem(s) with uncertain prognosis: Fecal incontinence  Established problem(s): History of rectal prolapse repair  Additional workup/treatment planned: Conservative measures, OTC medications, prescription medications, pelvic floor physical therapy  Risk of complications/morbidity: Moderate    Fortunately, her prolapse repair is intact and she has no recurrence of this on exam. As far as her control issues, I discussed with the family that this is typically multifactorial problem. We can start with thickening of the stool with fiber, cholestyramine, and Imodium over-the-counter. We can consider prescription medications for this as well. Second thing to try is pelvic floor physical therapy. Discussed with them what this entails. We will find a pelvic floor physical therapy center close to her home for convenience. I also briefly mentioned sacral nerve stimulator as a possible option down the road if she fails other options.      Continue with current prescription medications    DISPOSITION:  F/u PRN    My findings will be relayed to consulting practitioner or PCP via Epic note    Total encounter time:  30 min, including any number of the following: review of labs, imaging, provider notes, outside hospital records; performing examination/evaluation; counseling patient and family; ordering medications/tests; placing referrals and communication with referring physicians; coordination of care, and documentation in the EHR. Note completed using dictation software, please excuse any errors.     Electronically signed by Rosy Hicks MD on 6/23/2022 at 11:13 AM

## 2022-07-07 NOTE — PROGRESS NOTES
[address] [facility] [place] [address] [facility] [address]  Phone: [phone]  Fax: [phone]                                                      Physical Therapy Certification    Dear Marisa Douglas MD  ,    We had the pleasure of evaluating the following patient for physical therapy services at [place] [address]. A summary of our findings can be found in the initial assessment below. This includes our plan of care. If you have any questions or concerns regarding these findings, please do not hesitate to contact me at the office phone number checked above. Thank you for the referral.       Physician Signature:_______________________________Date:__________________  By signing above (or electronic signature), therapist's plan is approved by physician      Patient: Ness Valdes   : 1933   MRN: [id number]  Referring Physician:        Evaluation Date: 2022      Medical Diagnosis Information:  Diagnosis: K62.3  - Rectal prolapse                                             Insurance information: PT Insurance Information: [facility] Medicare - $30 co-pay    Second person requested for examination:  [x] No    [] Yes   If yes, who was present: son, Kayla Oliveira,  was present for most of evaluation, but preferred to remain outside the room during PF muscle examination - spouse always performs pericare and son does not participate with personal care needs. Precautions/ Contra-indications:  patient's age and cognitive status, cleared from surgical standpoint     Latex Allergy:  [x]NO      []YES    Preferred Language for Healthcare:   [x]English       []Other:    C-SSRS Triggered by Intake questionnaire (Past 2 wk assessment ):   [x] No, Questionnaire did not trigger screening.   [] Yes, Patient intake triggered C-SSRS Screening     [] Completed, no further action required.    [] Completed, PCP notified via Epic    Functional Outcome:   PFDI-20: - unable to assess due to cognitive deficits in patient     SUBJECTIVE: Patient stated complaint: fecal incontinence with limited sensation and has bowel accident about every other day, often times 2-3 times/day on the days she has bowel movement. Infrequent urinary incontinence mostly with urgency and stress. Prior to recent surgery patient had rectal prolapse with some issues of fecal incontinence but much worse after surgery. Denies pain - occasional UTIs - at least one in 12/2021 - often noticed by increased confusion. Most recent tests for UTIs are negative. Family trying to teach spouse how to proper perform pericare to avoid/limit UTIs. Severity of problem 10/10 per son and family - disruptive to son and spouse who needed to adjust schedules to help care for patient - trying to keep patient at home as long as possible - have interviewed for caregiver, but spouse was not initially receptive. Patient's/family's goals: decrease frequency of bowel incontinence and accidents    Pregnancies: [] No    [x] Yes, if yes #6  Deliveries: # and type: 6 vaginal deliveries     4 week or greater of failed trial of PFPT program?   [x] No    [] Yes    PFPT program as defined by \"Completing 4 weeks of an ordered plan of pelvic muscle exercises designed to increase periurethral muscle strength\". Relevant Medical History:  Per Dr. Jun Santoro on 6/23/2022  Kermit Michel is a 80 y.o. female who presents today for: Follow-up (3 month rectal prolapse-patient still has leakage or episoodes of incontinence, has recently started Prevalite)  HPI:         Kermit Michel is a 80 y.o. female well-known to me after rectal prolapse repair 4 months ago. She is here today with her  and son as she is still having issues with fecal control.   Has a fair bit of urgency as well as decreased sensation.     Past Medical History        Past Medical History:   Diagnosis Date    Arthritis       bilateral knee    Forgetfulness      Hx of blood clots 2020     DVT    Hyperlipidemia      Hypertension      Wears glasses       reading         Past Surgical History         Past Surgical History:   Procedure Laterality Date    CHOLECYSTECTOMY        HERNIA REPAIR         hiatal hernia    HYSTERECTOMY, TOTAL ABDOMINAL (CERVIX REMOVED)         complete    RECTAL PROLAPSE REPAIR N/A 2/18/2022     TRANSPERINEAL RECTOSIGMOIDECTOMY (ALTEMEIER PROCEDURE), FLEXIBLE SIGMOIDOSCOPY performed by Latisha Rice MD at [address] [facility],[place] [address]  Assessment/Plan:         A/P:  New problem(s) with uncertain prognosis: Fecal incontinence  Established problem(s): History of rectal prolapse repair  Additional workup/treatment planned: Conservative measures, OTC medications, prescription medications, pelvic floor physical therapy  Risk of complications/morbidity: Moderate     Fortunately, her prolapse repair is intact and she has no recurrence of this on exam. As far as her control issues, I discussed with the family that this is typically multifactorial problem. We can start with thickening of the stool with fiber, cholestyramine, and Imodium over-the-counter. We can consider prescription medications for this as well.     Second thing to try is pelvic floor physical therapy. Discussed with them what this entails.   We will find a pelvic floor physical therapy center close to her home for convenience.     I also briefly mentioned sacral nerve stimulator as a possible option down the road if she fails other options.      Continue with current prescription medications     DISPOSITION:  F/u PRN       Pain Scale: denies pain  Easing factors: NA  Provocative factors: NA  Type: []Constant   []Intermittent  []Radiating []Localized []other:    Numbness/Tingling: NA - reports limited sensation and unaware of having BM    Severity of problem: 10/10     Occupation/School: homemaker, raised 6 boys     Living Status/Prior Level of Function: Prior to this injury / incident, pt was independent with ADLs and IADLs, limited functional mobility, uses 4-wh walker has stairlift to basement. Has had infrequent falls about 1 every 6 months or so, sometimes after drinking small amount of alcohol/wine mixed with water. Performs seated ergometer used for both UEs and LEs. OBJECTIVE:  Ortho Screen:  Posture - poor, forward trunk lean and inclination  Other Observation - uses 4-wh walker, flexed trunk, stability from pressing upper thighs together and genuvalgus to widen base of support  Palpation  Alignment  Not formally tested -unsafe for functional mobility without 4-wh walker,  limitations noted but not unusual given patient's age and current medical condition.    ROM LEFT RIGHT Comments   Cervical Side Bend      Cervical Rotation      Shoulder Flex      Shoulder Abd      Shoulder ER      Shoulder IR            Lumbar Side Bend      Lumbar Rotation      Hip Flexion      Hip Abd      Hip ER      Hip IR      Hip Extension      Knee Ext      Knee Flex            Hamstring Flex      Piriformis                      Strength LEFT RIGHT Comments   Shoulder flexion      Shoulder scaption      Shoulder ER      Shoulder IR      Biceps      Triceps                  Multifidus      Transverse Ab   Poor - based on observation   Hip Flexors   Poor - based on observation   Hip Abductors   Poor - based on observation   Hip Extensors      Hip Internal Rotators      Hip External Rotators        TA Muscle Contraction Scale  Not formally tested, appears to have weakness throughout core, karlie in abdominals   Criteria                                               Score  Quality of Contraction   Not Present      [] 0   Rapid, Superificial     [] 1   Just Perceptible     [] 2     Gentle, Slow                [] 3    Substitution   Resting       [] 0   Moderate to Strong                           [] 1    Subtle Perceptible     [] 2   None                  [] 3    Symmetry of Contraction   Unilateral                  [] 0   Bilateral/Asymmetrical                [] 1   Symmetrical                  [] 2    Breathing     Inability/Difficulty Breathing during contraction                [] 0   Able to hold contraction while Breathing             [] 1    Holding   Able to Hold Contraction <10 s                         [] 0   Able to Hold Contraction >10 s               [] 1      NT/10  Adapted from [employer], Copyright 2009      Abdominals:  Scarring:   [] No    [x] Yes - mild scarring noted in L lower abdomen, no specific adhesions note  Diastasis:   [] No    [] Yes - NT  Functional stability:   [x] No    [] Yes - poor core stability with limited contraction     Pelvic Floor:  Observation  Skin condition - several reddened areas, noted white residue in abdominal and vulva folds, residual stool noted as well  Sensation - intact to light touch on external vulva  Scarring - none noted visually  Perineal descent - no active movement of PF noted with visual observation  External clock - mild tightness and moderate tenderness noted in bilateral transverse perineal, R>L  Contraction voluntary/reflexive - unable to perform volitional contraction of PT  Relaxation voluntary/bear down - unable to perform volitional contraction of PT      Internal Assessment  Introitus - reddened, fair to poor cleanliness   Vaginal vault - moderate atrophy  Internal clock   Superficial - moderate tightness and tenderness noted at 4-8 o'clock position   Middle/deep - moderate tightness and tenderness noted    Prolapse Test - NT  Quality of contraction - no active, volitional contraction, able to elicit with overflow during hip add sets only  Coordination - poor    PERF score  Power - 0/5, slight contraction and generalized increased tension noted with repetition of hip adductor sets x 10 reps  Endurance - NA  Repetitions - NA  Quick Flicks - NA                         [x] Patient history, allergies, meds reviewed. Medical chart reviewed. See intake form. Review Of Systems (ROS):  [x]Performed Review of systems (Integumentary, CardioPulmonary, Neurological) by intake and observation. Intake form has been scanned into medical record. Patient has been instructed to contact their primary care physician regarding ROS issues if not already being addressed at this time.       Co-morbidities/Complexities (which will affect course of rehabilitation):   []None        []Hx of COVID   Arthritic conditions   []Rheumatoid arthritis (M05.9)  [x]Osteoarthritis (M19.91) - B knees and R shoulder  []Gout   Cardiovascular conditions   [x]Hypertension (I10)  [x]Hyperlipidemia (E78.5)  []Angina pectoris (I20)  []Atherosclerosis (I70)  []Pacemaker  []Hx of CABG/stent/  cardiac surgeries   Musculoskeletal conditions   []Disc pathology   []Congenital spine pathologies   []Osteoporosis (M81.8)  []Osteopenia (M85.8)  []Scoliosis       Endocrine conditions   []Hypothyroid (E03.9)  []Hyperthyroid Gastrointestinal conditions   []Constipation (H87.01)   Metabolic conditions   []Morbid obesity (E66.01)  []Diabetes type 1(E10.65) or 2 (E11.65)   []Neuropathy (G60.9)     Cardio/Pulmonary conditions   []Asthma (J45)  []Coughing   []COPD (J44.9)  []CHF  []A-fib   Psychological Disorders  []Anxiety (F41.9)  []Depression (F32.9)   []Other:   Developmental Disorders  []Autism (F84.0)  []CP (G80)  []Down Syndrome (Q90.9)  []Developmental delay     Neurological conditions  []Prior Stroke (I69.30)  []Parkinson's (G20)  []Encephalopathy (G93.40)  []MS (G35)  []Post-polio (G14)  []SCI  []TBI  []ALS Other conditions  []Fibromyalgia (M79.7)  []Vertigo  []Syncope  []Kidney Failure  []Cancer      []currently undergoing                treatment  []Pregnancy  [x]Incontinence   Prior surgeries  []involved limb  []previous spinal surgery  [] section birth  [x]hysterectomy  [x]bowel / bladder surgery  [x]other relevant surgeries  X gal bladder surgery  X hernia repair  X rectal prolapse repair   []Other: Barriers to/and or personal factors that will affect rehab potential:              [x]Age  [x]Sex   []Smoker              []Motivation/Lack of Motivation                        [x]Co-Morbidities              [x]Cognitive Function, education/learning barriers              [x]Environmental, home barriers              []profession/work barriers  []past PT/medical experience  []other:  Justification: Patient demonstrates interest in and motivation for maximizing potential for improved PF muscle and bladder control. Falls Risk Assessment (30 days):   [] Falls Risk assessed and no intervention required. [x] Falls Risk assessed and Patient requires intervention due to being higher risk  - uses 4-wh walker   TUG score (>12s at risk):     [] Falls education provided, including         ASSESSMENT:      Patient presents to PF PT with complaints of fecal incontinence with limited sensation and has bowel accident about every other day, often times 2-3 times/day on the days she has bowel movement. Infrequent urinary incontinence mostly with urgency and stress. Prior to recent surgery patient had rectal prolapse with some issues of fecal incontinence but much worse after surgery. Denies pain - occasional UTIs - at least one in 12/2021 - often noticed by increased confusion. Most recent tests for UTIs are negative. Family trying to teach spouse how to proper perform pericare to avoid/limit UTIs. Severity of problem 10/10 per son and family - disruptive to son and spouse who needed to adjust schedules to help care for patient - trying to keep patient at home as long as possible - have interviewed for caregiver, but spouse was not receptive. However, by end of session, son and patient educated on benefits of having hired caregiver at least a couple of times/week to improve thoroughness of cleaning.   Also, emphasized need for regularly scheduled time for BMS, generally 30-60 minutes after meals or BEFORE activities, such as seated LE exercises with ergometer, which consistently result in bowel incontinence. Also, educated on using stool for optimal positioning during scheduled times to encourage more regularity with BMs. Patient's/family's goals: decrease frequency of bowel incontinence and accidents     After reviewing bowel/bladder behavioral modification information, PT used a pelvic floor model to orient the patient with her pelvic organ and pelvic floor muscles anatomy. Patient, with permission and encouragement from son, verbally consented to transvaginal pelvic muscle floor muscle assessment which revealed poor control/power of her pelvic floor muscles without mind-body connection and significantly limited coordination or even understanding of how to perform an isolated muscle contraction of the PF. Patient demonstrated 0/5 muscle contraction, so focused on possible overflow from surrounding muscles and was able to increase PF tension via overflow from hip add sets. Noted significant core instability and incoordination which can significantly effect systematic control of intraabdominal pressures subsequently challenge PF muscle control during functional activities. With transvaginal palpation to PF muscles, noted moderate to tightness and tenderness in PF muscles. Instructed patient in need to exhale during exertional activities, so breathe and not hold breath with sit to stand transfers and LE ergometer, but patient has limited cognitive capacity to recall and incorporate suggestions into her daily functional activities. Instructed in hip add sets with ball to encourage overflow contraction of PF muscles. Patient and supportive family would definitely benefit from pelvic floor physical therapy for continued education on healthy bladder/bowel habits and adjustment of behavioral modifications with focus on need to schedule/alot time for bowel movements so as to be preventive rather than reactive. Will attempt to advance activities to improve muscle control/ coordination and endurance of pelvic floor, core, and hip muscles with overflow from surrounding muscle to improve resting  tone/tension in PF muscles to improve ability to control BMs and limit frequency of fecal incontinence.      Functional Impairments:    [x]Noted lumbar/proximal hip hypomobility  []Noted lumbosacral and/or generalized hypermobility  [x]Decreased core/proximal hip strength and neuromuscular control   [x]Decreased LE functional strength  [x]pelvic/sacral/spinal malalignment   []Increased pain with penetration  [x]Absent/poor control of PF contraction   []Absent/poor control of PF relaxation  [x]Decreased control of bladder  [x]Decreased control of bowel    Functional Activity Limitations (from functional questionnaire and intake)  [x]Reduced ability to maintain good posture and demonstrate good body mechanics with basic functional mobility tasks - transfers and ambulation with 4-wh walker   [x]Reduced ability to perform lifting, reaching, carrying tasks  [x]Reduced ability to control urine  [x]Reduced ability to control bowel movements   [x]Reduced ability to ambulate prolonged functional periods/distances/surfaces  [x]Reduced ability to squat   [x]Reduced ability to forward bend  [x]Reduced ability to ascend/descend stairs  []Reduced ability to tolerate penetration/intercourse    Participation Restrictions  [x]Reduced participation in self care activities  [x]Reduced participation in home management activities  []Reduced participation in work activities  []Reduced participation in social activities  []Reduced participation in sport/recreational activities    Classification/Subgrouping:  []signs/symptoms consistent vaginismus/dyspareunia    []signs/symptoms consistent with pelvic floor organ prolapse  [x]signs/symptoms consistent with stress urinary incontinence  [x]signs/symptoms consistent with urge urinary incontinence  [x]signs/symptoms consistent with bowel incontinence  [x]signs/symptoms consistent with post-surgical status including decreased ROM, strength and function  []signs/symptoms consistent with other:       Prognosis/Rehab Potential:      []Excellent   [x]Good    [x]Fair   []Poor    Tolerance of evaluation/treatment:    []Excellent   [x]Good    []Fair   []Poor    Physical Therapy Evaluation Complexity Justification  [x] A history of present problem with:  [] no personal factors and/or comorbidities that impact the plan of care;  []1-2 personal factors and/or comorbidities that impact the plan of care  [x]3 personal factors and/or comorbidities that impact the plan of care  [x] An examination of body systems using standardized tests and measures addressing any of the following: body structures and functions (impairments), activity limitations, and/or participation restrictions;:  [] a total of 1-2 or more elements   [] a total of 3 or more elements   [x] a total of 4 or more elements   [x] A clinical presentation with:  [] stable and/or uncomplicated characteristics   [x] evolving clinical presentation with changing characteristics  [] unstable and unpredictable characteristics;   [x] Clinical decision making of [] low, [x] moderate, [] high complexity using standardized patient assessment instrument and/or measurable assessment of functional outcome. [] EVAL (LOW) 46455 (typically 20 minutes face-to-face)  [x] EVAL (MOD) 53929 (typically 30 minutes face-to-face)  [] EVAL (HIGH) 37710 (typically 45 minutes face-to-face)  [] RE-EVAL       PLAN:   Frequency/Duration:     Recommend see patient every ~1-2 weeks initially to perform tranvaginal PF muscle assessment and intervention as deemed necessary then to ensure patient is performing exercises for pelvic floor, hip and core stability correctly. Taper as appropriate, determining frequency of treatments based on progress, for a total of ~4-5+ sessions.  Next scheduled appointment is on 7/27 at 1PM.    Interventions:  [x]  Therapeutic exercise including: strength training, ROM, and functional mobility for joint, spine, core, and pelvic floor   [x]  NMR activation and proprioception for abdominals, pelvic floor musculature activation and coordination, and posture retraining   [x]  Manual therapy as indicated for spine, ribs, soft tissue, and pelvic floor to include: IASTM with or without dilator, STM, PROM, Gr I-IV mobilizations   [x] Modalities as needed that may include: E-stim, Biofeedback as indicated  [x] Patient education on pelvic floor anatomy and function, bladder and bowel anatomy and function, joint protection, postural re-education, activity modification, progression of HEP. Treatment Interventions Implemented    Exercise/Neuromuscular Re-education - Written HEP instructions provided and reviewed    Diaphragmatic breathing - coordinating with pelvic floor muscle activities - will need additional education and training to attempt to incorporate with functional activities as tends to hold breath    Hip adductor sets x 10 reps - hold for 5 seconds each, perform 1-2 times/day    Manual Interventions -   Patient verbally consented to transvaginal PF muscle assessment and intervention. Superficial external myofascial release and massage to transverse perineal and external anal sphincter muscles -    External clock - mild tightness and moderate tenderness noted in bilateral transverse perineal, R>L  Transvaginal myofascial release and stretching of superficial, middle, and deep layers of PF muscles with focus on areas of increased tension and tissue resistance. Internal clock             Superficial - moderate tightness and tenderness noted at 4-8 o'clock position             Middle/deep - moderate tightness and tenderness noted     No active, voluntary control of PF muscle contraction.  Performed hip add sets during transvaginal PF muscle assessment, after consistently. Addressed specific concerns of patient as they arose during session. Patient appeared to have a better understanding and appreciation of benefits of PF PT.     GOALS:  Patient/family stated goal: decrease frequency of bowel incontinence and accidents      [] Progressing: [] Met: [] Not Met: [] Adjusted      Therapist goals for Patient:     Short Term Goals: To be achieved in: 4-5 sessions    1. Patient will have a decrease in fecal incontinence and urinary urgency, frequency and incontinence  to indicate improvement in pelvic floor strength/motor control and relaxation, muscle coordination, and/or bladder retraining. [] Progressing: [] Met: [] Not Met: [] Adjusted  2. Perform HEP for pelvic floor and core muscle groups with minimal direction from therapist as she progresses to become more independent managing her pelvic pressure and PF and surrounding core muscle weakness and/or tightness. [] Progressing: [] Met: [] Not Met: [] Adjusted  3. With family/caregiver's assist, report using 1-2 behavioral modification strategies to reduce urinary/bowel complaints through dietary and mechanical changes, with focus on full emptying of bladder with each urination and using optimal positioning and deep breathing for relaxation of posterior PF muscles during defacation, reducing need to strain. [] Progressing: [] Met: [] Not Met: [] Adjusted    Long Term Goals: To be achieved in: 8-10 sessions    1. Patient will increase resting tone/tension of PF muscles to demonstrate increased strength and control over pelvic floor musculature and reduce fecal and urinary incontinence. [] Progressing: [] Met: [] Not Met: [] Adjusted  2. Patient will return to functional activities including standing up from a chair without increased symptoms or restriction, including promoting fecal and/or urinary incontinence due to increased intraabdominal pressure. [] Progressing: [] Met: [] Not Met: [] Adjusted  3.  Report fecal incontinence to no more that 2-3 times/week, (compared to 2-3 times every other day at initial eval). [] Progressing: [] Met: [] Not Met: [] Adjusted   4. With family assist, report using 3-4 behavioral modification strategies to reduce urinary/bowel complaints through dietary and mechanical changes, with focus on full emptying of bladder with each urination and using optimal positioning and deep breathing for relaxation of posterior PF muscles during defacation, reducing need to strain. [] Progressing: [] Met: [] Not Met: [] Adjusted   5. Rate severity of the problem related to fecal incontinence and urinary  frequency/urgency/incontinence to 5 or less on scale of 0-10 with 0 being no problem and 10 being the worst - (10/10 reported at intial eval). [] Progressing: [] Met: [] Not Met: [] Adjusted   6. Perform HEP for pelvic floor and core muscle groups independently, with guidance from caregivers,  as she progresses to self-manage her pelvic pressure/pain and PF and surrounding core muscle weakness and/or tightness. [] Progressing: [] Met: [] Not Met: [] Adjusted     Electronically signed by:  Fifi Hdz, PT #[id number]    Time in: 1000   Time Out:1145  Total Treatment Time: 105 minutes  Coded Treatment Time: 90 minutes    Charges:  PT Evaluation - Moderate Complexity (00279) x 1  Therapeutic Activity (62451) x2    Therapeutic Exercise (61057) x1    Manual (79231) x 1    Neuromuscular Re-ed (42000) x1    Note: If patient does not return for scheduled/recommended follow up visits, this note will serve as a discharge from care along with the most recent update on progress.

## 2022-07-13 ENCOUNTER — HOSPITAL ENCOUNTER (OUTPATIENT)
Dept: PHYSICAL THERAPY | Age: 87
Setting detail: THERAPIES SERIES
Discharge: HOME | End: 2022-07-13
Payer: MEDICARE

## 2022-07-13 PROCEDURE — 97140 MANUAL THERAPY 1/> REGIONS: CPT | Performed by: PHYSICAL THERAPIST

## 2022-07-13 PROCEDURE — 97530 THERAPEUTIC ACTIVITIES: CPT | Performed by: PHYSICAL THERAPIST

## 2022-07-13 PROCEDURE — 97110 THERAPEUTIC EXERCISES: CPT | Performed by: PHYSICAL THERAPIST

## 2022-07-13 PROCEDURE — 97162 PT EVAL MOD COMPLEX 30 MIN: CPT | Performed by: PHYSICAL THERAPIST

## 2022-07-13 PROCEDURE — 97112 NEUROMUSCULAR REEDUCATION: CPT | Performed by: PHYSICAL THERAPIST

## 2022-07-21 NOTE — PROGRESS NOTES
Alyce [facility] [address]  Phone: [phone]  Fax: [phone]        Physical Therapy Daily Treatment Note    Date: 2022    Patient Name: Rojas Black : 1933 MRN: [id number]    Restrictions/Precautions:    Medical/Treatment Diagnosis Information:    · Diagnosis: K62.3  - Rectal prolapse    Insurance/Certification information: PT Insurance Information: [facility] Medicare - $30 co-pay    Physician Information:  Estela Mukherjee MD     Plan of care signed (Y/N): Y  Cosigned by: Estela Mukherjee MD at 2022  2:38 PM       Visit# / total visits: 2/10     Time in: 1300  Time Out: 1400  Total Treatment Time: 60 minutes    Charges: Therapeutic Activity (57251) x2    Therapeutic Exercise (39584) x1    Manual (98420)    Neuromuscular Re-ed (06547) x1        ________________________________________________________________________________________    Pain Level: no specific complaints    SUBJECTIVE:   Patient to therapy session with son, Jaylin Turner. Reports that patient has been using positional stool for BMs and sitting on toilet about 30-60 minutes after each meal.  Patient is generally having small bowel movements. She has had only one bowel accident - diarrhea and after going out to eat, sitting around talking, and did not sit on the toilet within 60 minutes after eating. Son reports that family is divided whether patient should be placed in a nursing home, spouse has mixed feelings.       OBJECTIVE:    Treatment Interventions Implemented     Exercise/Neuromuscular Re-education - Written HEP instructions provided and reviewed   Reviewed -   Diaphragmatic breathing - coordinating with pelvic floor muscle activities - will need additional education and training to attempt to incorporate with functional activities as tends to hold breath    Reviewed and advanced   - Seated Overflow PF exercises:    Hip adductor sets x 10 reps - hold for 5 seconds each, perform 1-2 times/day  Resisted hip abduction - green exercise band - hold 5 seconds x 10 reps  [place] - pressing palms into thighs for increased tension in abdominals and PF muscles - hold 5 seconds x 10 reps    Seated LE exercises -   Knee ext x 15 reps each LE  Marching/hip flex x 15 reps each LE  Resisted knee flex x 15 reps each LE  Sit to stand x 10 reps - cues for optimal alignment and encouragement to continue      Manual Interventions -  Deferred to focus on activities to perform for overflow contraction/increased tension in PF muscles. Patient Education -   Extensive education on anatomy of pelvis including PF muscles and pelvic organs. Emphasized need for stretching and down training of tight muscles and strengthening of weak muscles to promote improve muscle balance in pelvis/low back and legs. Used PF model to demonstrate transvaginal PF muscle assessment to which patient verbally consented. Patient appeared to have better understanding of current bladder/bowel issues and improved outlook on situation by end of PF PT therapy session. Issued and reviewed handouts on , contributing factors to PF dysfunction, normal bladder health/bladder irritants, diaphragmatic breathing, constipation and accidental bowel leakage. Educated in mechanics of core stabilization with proper diaphragmatic breathing for improved intraabdominal pressures and therefore more balance control of pressure on PF muscles especially during functional activities. Specific education/techniques/strategies on need for improved bladder control. Emphasized continued behavioral modifications to improve urination and bowel movement during optimally scheduled times, generally 30-60 minutes after a meal, sit for ~10 minutes and use stool for optimal positioning to promote more regularity with BMs.  Reports doing this regularly with good results - no bowel accidents in last week except when went out to restaurant to eat and did not sit on toilet 30-60 minutes after breakfast.     Emphasized need for rest/sleeping well to improve tissue healing. Differences in approaches with various PF PTs - explained recommending more manual therapy to directly work on tissues/muscles that are tight and lending to pain and incorporating specific strategies to assist in allowing her muscles to stretch and relax more efficiently and effectively. Continued to encourage patient and son to reconsider hiring supplemental caregiver for patient to better address hygiene needs, encourage scheduled toileting, and perform suggested activities/exercises consistently. Encouraged son to get assistance from other family members who are in favor of keeping patient at home as long as possible to assist with daily needs and encourage exercises with patient on a more regular basis. Addressed specific concerns of patient as they arose during session. Patient appeared to have a better understanding and appreciation of benefits of PF PT.          ASSESSMENT:  Patient having less bowel accidents when using scheduled voiding after meals. One accident was when did not toilet within 60 minutes after a meal.   Spouse and son seemed overwhelmed with level of care that patient is needing, but family has mixed thoughts about placing patient into a facility. Continue to encourage hiring assistance at least a couple of days per week to assist with hygiene and showering patient. Advanced several seated overflow exercises to improve tension in PF muscles, but uncertain of carryover due to spouse and son being overwhelmed and patient's poor memory/dementia. Also reviewed and encourage patient to perform generalized strengthening of her legs which would help with both her functional mobility and improve muscle tone and strength in and around her pelvis.      Patient and supportive family would definitely benefit from pelvic floor physical therapy for continued education on healthy bladder/bowel habits and adjustment of behavioral modifications with focus on need to schedule/alot time for bowel movements so as to be preventive rather than reactive. Will attempt to advance activities to improve muscle control/ coordination and endurance of pelvic floor, core, and hip muscles with overflow from surrounding muscle to improve resting  tone/tension in PF muscles to improve ability to control BMs and limit frequency of fecal incontinence. Treatment/Activity Tolerance:    Patient tolerated treatment well     Patient limited by other medical complications - dementia and limited ability with carryover - son with limited ability to encourage exercises and spouse seems overwhelmed just managing patient's basic needs        GOALS:  Patient/family stated goal: decrease frequency of bowel incontinence and accidents      [x] Progressing: [] Met: [] Not Met: [] Adjusted        Therapist goals for Patient:     Short Term Goals: To be achieved in: 4-5 sessions     1. Patient will have a decrease in fecal incontinence and urinary urgency, frequency and incontinence  to indicate improvement in pelvic floor strength/motor control and relaxation, muscle coordination, and/or bladder retraining. [x] Progressing: [] Met: [] Not Met: [] Adjusted  2. Perform HEP for pelvic floor and core muscle groups with minimal direction from therapist as she progresses to become more independent managing her pelvic pressure and PF and surrounding core muscle weakness and/or tightness. [x] Progressing: [] Met: [] Not Met: [] Adjusted  3.  With family/caregiver's assist, report using 1-2 behavioral modification strategies to reduce urinary/bowel complaints through dietary and mechanical changes, with focus on full emptying of bladder with each urination and using optimal positioning and deep breathing for relaxation of posterior PF muscles during defacation, reducing need to strain. [x] Progressing: [] Met: [] Not Met: [] Adjusted     Long Term Goals: To be achieved in: 8-10 sessions     1. Patient will increase resting tone/tension of PF muscles to demonstrate increased strength and control over pelvic floor musculature and reduce fecal and urinary incontinence. [] Progressing: [] Met: [] Not Met: [] Adjusted  2. Patient will return to functional activities including standing up from a chair without increased symptoms or restriction, including promoting fecal and/or urinary incontinence due to increased intraabdominal pressure. [] Progressing: [] Met: [] Not Met: [] Adjusted  3. Report fecal incontinence to no more that 2-3 times/week, (compared to 2-3 times every other day at initial eval). [] Progressing: [] Met: [] Not Met: [] Adjusted            4. With family assist, report using 3-4 behavioral modification strategies to reduce urinary/bowel complaints through dietary and mechanical changes, with focus on full emptying of bladder with each urination and using optimal positioning and deep breathing for relaxation of posterior PF muscles during defacation, reducing need to strain. [] Progressing: [] Met: [] Not Met: [] Adjusted            5. Rate severity of the problem related to fecal incontinence and urinary  frequency/urgency/incontinence to 5 or less on scale of 0-10 with 0 being no problem and 10 being the worst - (10/10 reported at intial eval). [] Progressing: [] Met: [] Not Met: [] Adjusted            6. Perform HEP for pelvic floor and core muscle groups independently, with guidance from caregivers,  as she progresses to self-manage her pelvic pressure/pain and PF and surrounding core muscle weakness and/or tightness. [] Progressing: [] Met: [] Not Met: [] Adjusted                  Plan:   Continue per plan of care   Discuss home situation and possible support systems put in place.     Consider discontinuing PF PT if patient/spouse/family are managing bowel movements and continue with less frequent fecal incontinence episodes. Frequency/Duration:     Will follow up in ~2 weeks to determine how toileting schedule is working and advance exercises as able or consider discontinuing PF PT. Patient's next scheduled appointment is on 8/10 at 11AM.    Electronically signed by Rockne Lombard, PT #[id number] on 7/27/2022 at 3:39 PM    Note: If patient does not return for scheduled/recommended follow up visits, this note will serve as a discharge from care along with the most recent update on progress.

## 2022-07-27 ENCOUNTER — HOSPITAL ENCOUNTER (OUTPATIENT)
Dept: PHYSICAL THERAPY | Age: 87
Setting detail: THERAPIES SERIES
Discharge: HOME | End: 2022-07-27
Payer: MEDICARE

## 2022-07-27 PROCEDURE — 97112 NEUROMUSCULAR REEDUCATION: CPT | Performed by: PHYSICAL THERAPIST

## 2022-07-27 PROCEDURE — 97530 THERAPEUTIC ACTIVITIES: CPT | Performed by: PHYSICAL THERAPIST

## 2022-07-27 PROCEDURE — 97110 THERAPEUTIC EXERCISES: CPT | Performed by: PHYSICAL THERAPIST

## 2022-08-04 NOTE — PROGRESS NOTES
[facility]  Phone: [phone]   Fax: [phone]      Physical Therapy Discharge Summary  Pelvic Floor Physical Therapy     Dear Dr. Gregory Syed  ,     We had the pleasure of treating the following patient for physical therapy services at [address] [place] [address]. A summary of our findings can be found in the discharge summary below. If you have any questions or concerns regarding these findings, please do not hesitate to contact me at the office phone number above. Thank you for the referral.      Date: 8/10/2022    Patient: Emmie Moncada   : 1933   MRN: [id number]  Referring Physician:        Evaluation Date: 8/10/2022      Medical Diagnosis Information:  Diagnosis: K62.3  - Rectal prolapse                                             Insurance information: PT Insurance Information: [facility] Medicare - $30 co-pay      Functional Outcome:          Severity of problem 3/10 (at initial eval 10/10) - rated by son due to patient with dementia/cognitive deficits                       Overall Response to Treatment:              [x]Patient is responding well to treatment and improvement is noted with regards  to goals              [x]Patient should continue to improve in reasonable time if they continue HEP              []Patient has plateaued and is no longer responding to skilled PT intervention                    []Patient is getting worse and would benefit from return to referring MD              []Patient unable to adhere to initial POC              []Other:      Date range of Visits: -8/10/2022  Total Visits: 3     Recommendation:               [x] Discharge to HEP. Follow up with PT or MD PRN. Please see last progress note below for discharge status when last seen on 8/10/22.          Electronically signed by:  Cindy Mustafa, [address] [place] [address] Physical Therapy  [address] [place] [address]  Phone: [phone]  Fax: [phone]        Physical Therapy Daily Treatment Note    Date: 8/10/2022    Patient Name: Jeffrey Cooper : 1933 MRN: [id number]    Restrictions/Precautions:    Medical/Treatment Diagnosis Information:    · Diagnosis: K62.3  - Rectal prolapse    Insurance/Certification information: PT Insurance Information: [facility] Medicare - $30 co-pay    Physician Information:  Alexey Jasmine MD     Plan of care signed (Y/N): Y  Cosigned by: Alexey Jasmine MD at 2022  2:38 PM       Visit# / total visits: 3/10     Time in: 1100  Time Out: 1200  Total Treatment Time: 60  minutes    Charges: Therapeutic Activity (50364) x2    Therapeutic Exercise (53446) x1    Manual (82153)    Neuromuscular Re-ed (35656) x1        ________________________________________________________________________________________    Pain Level: no specific complaints    SUBJECTIVE:   Patient to therapy session with son, Ana Rosa Noguera. He has been doing the exercises with patient 3-4 times/week. Reports that patient has been using positional stool for BMs and sitting on toilet for about 15 minutes about 30-60 minutes after each meal.  Reports patient sometimes is crying while sitting on toilet - made suggestions - see education below. Patient is generally having regular bowel movements about every other day, generally after breakfast or lunch.        OBJECTIVE:    Treatment Interventions Implemented     Exercise/Neuromuscular Re-education - Written HEP instructions provided and reviewed   Reviewed -   Diaphragmatic breathing - coordinating with pelvic floor muscle activities - will need additional education and training to attempt to incorporate with functional activities as tends to hold breath    Reviewed and advanced   - Seated Overflow PF exercises:    Hip adductor sets x 10 reps - hold for 5 seconds each, perform 1-2 times/day  Resisted hip abduction - green exercise band - hold 5 seconds x 10 reps  Pancakes - pressing palms into thighs for increased tension in abdominals and PF muscles - hold 5 seconds x 10 reps    Seated LE exercises -   Knee ext x 15 reps each LE  Marching/hip flex x 15 reps each LE  Resisted knee flex x 15 reps each LE  Sit to stand x 5 reps x 2 sets - initially cues for optimal alignment but able to continue with less cues and improved strength, also improved use of R UE to assist with sit to stand    Son has been helping patient with exercises for her UEs, specifically for her R shoulder. Patient demonstrated improved ROM and strength with R UE compared to when previously assessed during functional activities. Patient requested to urinate during therapy session. PT assisted patient to toilet - ambulated with rollator and supervision for direction to locate since in unfamiliar surroundings. Patient able to manage pants down/up and perform pericare after urination while seated on toilet. Required min A to stand from lower toilet despite use of grab bar. Noted patient continues with dead skin/tissue in folds at proximal thigh - informed son and reiterated recommendation for home health aide to assist with shower a couple of times each week since spouse and sons are not willing/don't feel appropriate to provide this care. Manual Interventions -  Deferred to focus on activities to perform for overflow contraction/increased tension in PF muscles. Patient Education -   Extensive education on anatomy of pelvis including PF muscles and pelvic organs. Emphasized need for stretching and down training of tight muscles and strengthening of weak muscles to promote improve muscle balance in pelvis/low back and legs. Used PF model to demonstrate transvaginal PF muscle assessment to which patient verbally consented. Patient appeared to have better understanding of current bladder/bowel issues and improved outlook on situation by end of PF PT therapy session. Issued and reviewed handouts on , contributing factors to PF dysfunction, normal bladder health/bladder irritants, diaphragmatic breathing, constipation and accidental bowel leakage. Educated in mechanics of core stabilization with proper diaphragmatic breathing for improved intraabdominal pressures and therefore more balance control of pressure on PF muscles especially during functional activities. Specific education/techniques/strategies on need for improved bladder control. Emphasized continued behavioral modifications to improve urination and bowel movement during optimally scheduled times, generally 30-60 minutes after a meal, sit for ~10 minutes and use stool for optimal positioning to promote more regularity with BMs. Reports doing this regularly with good results - no bowel accidents in few weeks. Since having BMs generally after breakfast or maybe lunch, and not dinner, suggested that patient may not have to sit on toilet after dinner. Also, suggested decrease time sitting on toilet to 5-10 minutes rather than 15 since increased time seems to be upsetting and possibly uncomfortable for patient. Emphasized need for rest/sleeping well to improve tissue healing. Differences in approaches with various PF PTs - explained recommending more manual therapy to directly work on tissues/muscles that are tight and lending to pain and incorporating specific strategies to assist in allowing her muscles to stretch and relax more efficiently and effectively. Continued to encourage patient and son to reconsider hiring supplemental caregiver for patient to better address hygiene needs, encourage scheduled toileting, and perform suggested activities/exercises consistently. Son mentioned possibly hiring a caregiver for 2x/week for hygiene needs and to allow respite for spouse.      Encouraged son to get assistance from other family members who are in favor of keeping patient at home as long as possible to assist with daily needs and encourage exercises with patient on a more regular basis. Educated in need to maintain BMs at a reasonable consistency - ensure patient is well hydrated so stools are not too hard but not runny with inability to maintain continence. Educated son in differences between Miralax and Metamucil. Addressed specific concerns of patient and son as they arose during session. Patient/son appeared to have a better understanding and appreciation of benefits of PF PT.          ASSESSMENT:  Patient has had no bowel accidents in the last several weeks with continued use of scheduled voiding after meals. Patient may be able to decrease frequency and duration of scheduled toileting if some pattern is noted, for example son mentioned patient generally has BM every other day generally after breakfast or lunch. Spouse and son seemed less overwhelmed with level of care that patient is needing since patient is no longer having fecal incontinence and accidents soiling clothing and bedding. Family has mixed thoughts about placing patient into a facility. Continue to encourage hiring assistance at least a couple of days per week to assist with hygiene and showering patient. Reviewed seated overflow exercises to improve tension in PF muscles and general LE exercises, which son is assisting patient to perform 3-4 times/week as  patient is limited due to poor memory/dementia and spouse is already assisting patient with her daily functional needs. Patient has met all of her goals, with family reporting significant improvement of the severity of the problem in regards to fecal incontinence.  Son was provided with contact information if he has questions regarding the patient in the future, but agreeable to discontinuing PF PT and continuing with home program.        Treatment/Activity Tolerance:    Patient tolerated treatment well     Patient limited by other medical complications - dementia and limited ability with carryover - son has been committed to assisting patient with her exercises and spouse is managing patient's basic needs, including continuing to implement a voiding schedule. GOALS:  Patient/family stated goal: decrease frequency of bowel incontinence and accidents      [] Progressing: [x] Met: [] Not Met: [] Adjusted        Therapist goals for Patient:     Short Term Goals: To be achieved in: 4-5 sessions     1. Patient will have a decrease in fecal incontinence and urinary urgency, frequency and incontinence  to indicate improvement in pelvic floor strength/motor control and relaxation, muscle coordination, and/or bladder retraining. [] Progressing: [x] Met: [] Not Met: [] Adjusted  2. Perform HEP for pelvic floor and core muscle groups with minimal direction from therapist as she progresses to become more independent managing her pelvic pressure and PF and surrounding core muscle weakness and/or tightness. [] Progressing: [x] Met: [] Not Met: [] Adjusted  3. With family/caregiver's assist, report using 1-2 behavioral modification strategies to reduce urinary/bowel complaints through dietary and mechanical changes, with focus on full emptying of bladder with each urination and using optimal positioning and deep breathing for relaxation of posterior PF muscles during defacation, reducing need to strain. [] Progressing: [x] Met: [] Not Met: [] Adjusted     Long Term Goals: To be achieved in: 8-10 sessions     1. Patient will increase resting tone/tension of PF muscles to demonstrate increased strength and control over pelvic floor musculature and reduce fecal and urinary incontinence. [] Progressing: [x] Met: [] Not Met: [] Adjusted  2. Patient will return to functional activities including standing up from a chair without increased symptoms or restriction, including promoting fecal and/or urinary incontinence due to increased intraabdominal pressure.   [] Progressing: [x] Met: [] Not Met: [] Adjusted  3. Report fecal incontinence to no more that 2-3 times/week, (compared to 2-3 times every other day at initial eval). [] Progressing: [x] Met: [] Not Met: [] Adjusted            4. With family assist, report using 3-4 behavioral modification strategies to reduce urinary/bowel complaints through dietary and mechanical changes, with focus on full emptying of bladder with each urination and using optimal positioning and deep breathing for relaxation of posterior PF muscles during defacation, reducing need to strain. [] Progressing: [x] Met: [] Not Met: [] Adjusted            5. Rate severity of the problem related to fecal incontinence and urinary  frequency/urgency/incontinence to 5 or less on scale of 0-10 with 0 being no problem and 10 being the worst - (10/10 reported at intial eval). - severity of problem rated by son at 3/10 currently  [] Progressing: [x] Met: [] Not Met: [] Adjusted            6. Perform HEP for pelvic floor and core muscle groups independently, with guidance from caregivers,  as she progresses to self-manage her pelvic pressure/pain and PF and surrounding core muscle weakness and/or tightness. [] Progressing: [x] Met: [] Not Met: [] Adjusted                  Plan:   Discontinue PF PT. With assistance of family, patient will continue with home program, including voiding schedule and exercises. Electronically signed by Theodora Berrios, PT #[id number] on 8/10/2022 at 12:17 PM    Note: If patient does not return for scheduled/recommended follow up visits, this note will serve as a discharge from care along with the most recent update on progress.

## 2022-08-10 ENCOUNTER — HOSPITAL ENCOUNTER (OUTPATIENT)
Dept: PHYSICAL THERAPY | Age: 87
Setting detail: THERAPIES SERIES
Discharge: HOME | End: 2022-08-10
Payer: MEDICARE

## 2022-08-10 PROCEDURE — 97112 NEUROMUSCULAR REEDUCATION: CPT | Performed by: PHYSICAL THERAPIST

## 2022-08-10 PROCEDURE — 97110 THERAPEUTIC EXERCISES: CPT | Performed by: PHYSICAL THERAPIST

## 2022-08-10 PROCEDURE — 97530 THERAPEUTIC ACTIVITIES: CPT | Performed by: PHYSICAL THERAPIST

## 2023-10-06 ENCOUNTER — HOSPITAL ENCOUNTER (EMERGENCY)
Age: 88
Discharge: HOME | End: 2023-10-07
Payer: MEDICARE

## 2023-10-06 VITALS
DIASTOLIC BLOOD PRESSURE: 68 MMHG | SYSTOLIC BLOOD PRESSURE: 152 MMHG | TEMPERATURE: 97.7 F | RESPIRATION RATE: 18 BRPM | HEART RATE: 64 BPM | HEIGHT: 61 IN | BODY MASS INDEX: 24.47 KG/M2 | OXYGEN SATURATION: 99 % | WEIGHT: 129.63 LBS

## 2023-10-06 DIAGNOSIS — S01.01XA LACERATION OF SCALP, INITIAL ENCOUNTER: Primary | ICD-10-CM
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
## 2023-10-06 DIAGNOSIS — Z23 NEED FOR TETANUS BOOSTER: ICD-10-CM

## 2023-10-06 PROCEDURE — 12002 RPR S/N/AX/GEN/TRNK2.6-7.5CM: CPT

## 2023-10-06 PROCEDURE — 90471 IMMUNIZATION ADMIN: CPT | Performed by: NURSE PRACTITIONER

## 2023-10-06 PROCEDURE — 99284 EMERGENCY DEPT VISIT MOD MDM: CPT

## 2023-10-06 PROCEDURE — 6360000002 HC RX W HCPCS: Performed by: NURSE PRACTITIONER

## 2023-10-06 PROCEDURE — 90715 TDAP VACCINE 7 YRS/> IM: CPT | Performed by: NURSE PRACTITIONER

## 2023-10-06 RX ORDER — ACETAMINOPHEN 325 MG/1
650 TABLET ORAL ONCE
Status: COMPLETED | OUTPATIENT
Start: 2023-10-06 | End: 2023-10-07

## 2023-10-06 RX ADMIN — TETANUS TOXOID, REDUCED DIPHTHERIA TOXOID AND ACELLULAR PERTUSSIS VACCINE, ADSORBED 0.5 ML: 5; 2.5; 8; 8; 2.5 SUSPENSION INTRAMUSCULAR at 23:22

## 2023-10-06 ASSESSMENT — LIFESTYLE VARIABLES
HOW OFTEN DO YOU HAVE A DRINK CONTAINING ALCOHOL: MONTHLY OR LESS
HOW MANY STANDARD DRINKS CONTAINING ALCOHOL DO YOU HAVE ON A TYPICAL DAY: 1 OR 2

## 2023-10-06 ASSESSMENT — PAIN - FUNCTIONAL ASSESSMENT
PAIN_FUNCTIONAL_ASSESSMENT: NONE - DENIES PAIN
PAIN_FUNCTIONAL_ASSESSMENT: NONE - DENIES PAIN

## 2023-10-07 PROCEDURE — 6370000000 HC RX 637 (ALT 250 FOR IP): Performed by: NURSE PRACTITIONER

## 2023-10-07 RX ADMIN — ACETAMINOPHEN 650 MG: 325 TABLET ORAL at 00:03

## 2023-10-07 ASSESSMENT — PAIN SCALES - GENERAL: PAINLEVEL_OUTOF10: 0

## 2023-10-07 ASSESSMENT — PAIN - FUNCTIONAL ASSESSMENT: PAIN_FUNCTIONAL_ASSESSMENT: 0-10

## 2023-10-07 NOTE — DISCHARGE INSTRUCTIONS
Return to the emergency department for new or worsening symptoms including not limited to, developing severe headache, nausea, vomiting, lightheadedness, dizziness, inability to ambulate on her walker assess her baseline, confusion, personality changes, clear or bloody drainage from nose or ears, personality changes, or other symptoms or concerns. Follow-up with your provider for reevaluation next 1-2 days    Keep the laceration line clean and dry. Clean with soap and water and blot dry. Suture removal x5 in 7 days.

## 2023-10-07 NOTE — ED TRIAGE NOTES
Patient to ED via EMS for head laceration due to a fall from bed. Patient is alert and oriented at time of arrival to ED. EMS reports that patient rolled out of bed striking her head on a night stand. Patient has laceration to her left scalp. Patient denies any LOC, neck or back pain. Patient denies any other complaints at this time.

## 2023-10-07 NOTE — ED NOTES
Discharge and education instructions reviewed. Patient verbalized understanding, teach-back successful. Patient denied questions at this time. No acute distress noted. Patient instructed to follow-up as noted - return to emergency department if symptoms worsen. Patient verbalized understanding. Discharged per EDMD with discharge instructions.        Elston Severe, RN  10/07/23 9520

## 2023-10-07 NOTE — ED PROVIDER NOTES
[address] [facility] [address] [address]        Pt Name: Karine Alvarado  MRN: [id number]  [address] [facility] 6/21/1933  Date of evaluation: 10/6/2023  Provider: FLORIAN Lo - CNP  PCP: Isi Leyva MD  Note Started: 12:07 AM EDT 10/7/23      YUAN. I have evaluated this patient. CHIEF COMPLAINT       Chief Complaint   Patient presents with    Fall    Head Laceration       HISTORY OF PRESENT ILLNESS: 1 or more Elements     {History from (Optional):92149}    {Limitations to history (Optional):56836}    Karine Alvarado is a 80 y.o. female who presents ***    Nursing Notes were all reviewed and agreed with or any disagreements were addressed in the HPI. REVIEW OF SYSTEMS :      Review of Systems    Positives and Pertinent negatives as per HPI. SURGICAL HISTORY     Past Surgical History:   Procedure Laterality Date    CHOLECYSTECTOMY      HERNIA REPAIR      hiatal hernia    HYSTERECTOMY, TOTAL ABDOMINAL (CERVIX REMOVED)      complete    RECTAL PROLAPSE REPAIR N/A 2/18/2022    TRANSPERINEAL RECTOSIGMOIDECTOMY (ALTEMEIER PROCEDURE), FLEXIBLE SIGMOIDOSCOPY performed by Benton Bryant MD at [address] [place] [address]       Previous Medications    AMLODIPINE (NORVASC) 5 MG TABLET    Take 5 mg by mouth nightly     ATORVASTATIN (LIPITOR) 20 MG TABLET    Take 20 mg by mouth at bedtime     CALCIUM CARBONATE (OSCAL) 500 MG TABS TABLET    Take 400 mg by mouth 2 times daily    CHOLESTYRAMINE LIGHT (PREVALITE) 4 G PACKET    Take 4 g by mouth 2 times daily    DOCUSATE SODIUM (COLACE) 100 MG CAPSULE    Take 1 capsule by mouth 2 times daily as needed for Constipation (take while on narcotic pain medication)    TRIAMTERENE-HYDROCHLOROTHIAZIDE (MAXZIDE-25) 37.5-25 MG PER TABLET    Take 1 tablet by mouth daily        ALLERGIES     Patient has no known allergies.     FAMILYHISTORY       Family History   Problem Relation Age of Onset    Dementia Mother     Heart Disease Considered intracranial hemorrhage, fracture/dislocation of skull/cervical spine, neurologic injury, vascular injury, involvement of bone that could lead to osteomyelitis, retained foreign body, delayed bacterial skin infection, other       Raymond Verduzco is a 80 y.o. nontoxic, well-appearing female who presents to the emergency department for evaluation status post she \"rolled out of bed\" and lacerated her posterior left occipital scalp. Denies loss of consciousness, headache, neck or back pain, blood thinner use, rib pain, extremity pain, confusion, nausea, vomiting, or other injury/concerns. Patient is of her baseline mentation per her sons. As I am performing my exam she tells me her name, date of birth, and her son's names. She is not able to tell me the year. Her son states this is normal.  They state that she is at her baseline mentation. After she is unable to tell me the year they requests that I \"ask her to spell \"something\". Patient is able to spell simple words briskly and correctly. Given the patient is at baseline mentation, did not experience any loss of consciousness, has no skull depression, no neurological deficit, no scalp hematoma, no abnormal behavior, no extremity injury, no midline cervical, thoracic, lumbar spine tenderness, is at baseline mentation, no headache, no nausea or vomiting, imaging will not be obtained. Laceration repair will be performed. Asked patient and family if she/they would like to have the area anesthetized or simply perform placement of staples. Patient and family opted for staples only. Laceration was closed as above. Patient was given the following medications:  Medications   tetanus-diphth-acell pertussis (BOOSTRIX) injection 0.5 mL (0.5 mLs IntraMUSCular Given 10/6/23 7070)   acetaminophen (TYLENOL) tablet 650 mg (650 mg Oral Given 10/7/23 0003)             Is this patient to be included in the SEP-1 Core Measure due to severe sepsis or septic shock?

## 2023-10-11 ASSESSMENT — ENCOUNTER SYMPTOMS
VOMITING: 0
BACK PAIN: 0
COUGH: 0
RHINORRHEA: 0
SHORTNESS OF BREATH: 0
SORE THROAT: 0
NAUSEA: 0
ABDOMINAL PAIN: 0
ANAL BLEEDING: 0
EYE PAIN: 0

## 2023-10-15 ENCOUNTER — APPOINTMENT (OUTPATIENT)
Dept: GENERAL RADIOLOGY | Age: 88
End: 2023-10-15
Payer: MEDICARE

## 2023-10-15 ENCOUNTER — HOSPITAL ENCOUNTER (INPATIENT)
Age: 88
LOS: 5 days | Discharge: HOME | End: 2023-10-20
Attending: EMERGENCY MEDICINE | Admitting: INTERNAL MEDICINE
Payer: MEDICARE

## 2023-10-15 ENCOUNTER — APPOINTMENT (OUTPATIENT)
Dept: CT IMAGING | Age: 88
End: 2023-10-15
Payer: MEDICARE

## 2023-10-15 DIAGNOSIS — Z71.89 GOALS OF CARE, COUNSELING/DISCUSSION: ICD-10-CM

## 2023-10-15 DIAGNOSIS — R29.6 FREQUENT FALLS: ICD-10-CM

## 2023-10-15 DIAGNOSIS — N39.0 URINARY TRACT INFECTION WITHOUT HEMATURIA, SITE UNSPECIFIED: Primary | ICD-10-CM

## 2023-10-15 DIAGNOSIS — R41.82 ALTERED MENTAL STATUS, UNSPECIFIED ALTERED MENTAL STATUS TYPE: ICD-10-CM

## 2023-10-15 PROBLEM — G93.41 ACUTE METABOLIC ENCEPHALOPATHY: Status: ACTIVE | Noted: 2023-10-15

## 2023-10-15 LAB
ALBUMIN SERPL-MCNC: 4.2 G/DL (ref 3.4–5)
ALBUMIN/GLOB SERPL: 1.6 {RATIO} (ref 1.1–2.2)
ALP SERPL-CCNC: 64 U/L (ref 40–129)
ALT SERPL-CCNC: 12 U/L (ref 10–40)
ANION GAP SERPL CALCULATED.3IONS-SCNC: 15 MMOL/L (ref 3–16)
AST SERPL-CCNC: 16 U/L (ref 15–37)
BACTERIA URNS QL MICRO: ABNORMAL /HPF
BASE EXCESS BLDV CALC-SCNC: 2 MMOL/L
BASOPHILS # BLD: 0 K/UL (ref 0–0.2)
BASOPHILS NFR BLD: 0.3 %
BILIRUB SERPL-MCNC: 0.3 MG/DL (ref 0–1)
BILIRUB UR QL STRIP.AUTO: NEGATIVE
BUN SERPL-MCNC: 28 MG/DL (ref 7–20)
CALCIUM SERPL-MCNC: 9.3 MG/DL (ref 8.3–10.6)
CHLORIDE SERPL-SCNC: 98 MMOL/L (ref 99–110)
CLARITY UR: ABNORMAL
CO2 BLDV-SCNC: 30 MMOL/L
CO2 SERPL-SCNC: 25 MMOL/L (ref 21–32)
COHGB MFR BLDV: 1.3 %
COLOR UR: YELLOW
CREAT SERPL-MCNC: 0.7 MG/DL (ref 0.6–1.2)
DEPRECATED RDW RBC AUTO: 15.1 % (ref 12.4–15.4)
EKG ATRIAL RATE: 67 BPM
EKG DIAGNOSIS: NORMAL
EKG P AXIS: 59 DEGREES
EKG P-R INTERVAL: 188 MS
EKG Q-T INTERVAL: 448 MS
EKG QRS DURATION: 160 MS
EKG QTC CALCULATION (BAZETT): 473 MS
EKG R AXIS: -21 DEGREES
EKG T AXIS: 91 DEGREES
EKG VENTRICULAR RATE: 67 BPM
EOSINOPHIL # BLD: 0.2 K/UL (ref 0–0.6)
EOSINOPHIL NFR BLD: 1.8 %
EPI CELLS #/AREA URNS AUTO: 5 /HPF (ref 0–5)
GFR SERPLBLD CREATININE-BSD FMLA CKD-EPI: >60 ML/MIN/{1.73_M2}
GLUCOSE SERPL-MCNC: 114 MG/DL (ref 70–99)
GLUCOSE UR STRIP.AUTO-MCNC: NEGATIVE MG/DL
HCO3 BLDV-SCNC: 28 MMOL/L (ref 23–29)
HCT VFR BLD AUTO: 36.6 % (ref 36–48)
HGB BLD-MCNC: 12.3 G/DL (ref 12–16)
HGB UR QL STRIP.AUTO: ABNORMAL
HYALINE CASTS #/AREA URNS AUTO: 0 /LPF (ref 0–8)
KETONES UR STRIP.AUTO-MCNC: NEGATIVE MG/DL
LEUKOCYTE ESTERASE UR QL STRIP.AUTO: ABNORMAL
LYMPHOCYTES # BLD: 0.8 K/UL (ref 1–5.1)
LYMPHOCYTES NFR BLD: 7.1 %
MAGNESIUM SERPL-MCNC: 1.8 MG/DL (ref 1.8–2.4)
MCH RBC QN AUTO: 29.7 PG (ref 26–34)
MCHC RBC AUTO-ENTMCNC: 33.6 G/DL (ref 31–36)
MCV RBC AUTO: 88.3 FL (ref 80–100)
METHGB MFR BLDV: 0.3 %
MONOCYTES # BLD: 0.9 K/UL (ref 0–1.3)
MONOCYTES NFR BLD: 7.9 %
NEUTROPHILS # BLD: 9.8 K/UL (ref 1.7–7.7)
NEUTROPHILS NFR BLD: 82.9 %
NITRITE UR QL STRIP.AUTO: NEGATIVE
O2 THERAPY: NORMAL
PCO2 BLDV: 48.7 MMHG (ref 40–50)
PH BLDV: 7.37 [PH] (ref 7.35–7.45)
PH UR STRIP.AUTO: 6.5 [PH] (ref 5–8)
PLATELET # BLD AUTO: 288 K/UL (ref 135–450)
PMV BLD AUTO: 9.3 FL (ref 5–10.5)
PO2 BLDV: <30 MMHG
POTASSIUM SERPL-SCNC: 3.5 MMOL/L (ref 3.5–5.1)
PROT SERPL-MCNC: 6.8 G/DL (ref 6.4–8.2)
PROT UR STRIP.AUTO-MCNC: 30 MG/DL
RBC # BLD AUTO: 4.14 M/UL (ref 4–5.2)
RBC CLUMPS #/AREA URNS AUTO: 2 /HPF (ref 0–4)
SAO2 % BLDV: 52 %
SODIUM SERPL-SCNC: 138 MMOL/L (ref 136–145)
SP GR UR STRIP.AUTO: 1.01 (ref 1–1.03)
TROPONIN, HIGH SENSITIVITY: 31 NG/L (ref 0–14)
TROPONIN, HIGH SENSITIVITY: 36 NG/L (ref 0–14)
TROPONIN, HIGH SENSITIVITY: 39 NG/L (ref 0–14)
TSH SERPL DL<=0.005 MIU/L-ACNC: 1.62 UIU/ML (ref 0.27–4.2)
UA COMPLETE W REFLEX CULTURE PNL UR: YES
UA DIPSTICK W REFLEX MICRO PNL UR: YES
URN SPEC COLLECT METH UR: ABNORMAL
UROBILINOGEN UR STRIP-ACNC: 0.2 E.U./DL
WBC # BLD AUTO: 11.9 K/UL (ref 4–11)
WBC #/AREA URNS AUTO: 20 /HPF (ref 0–5)

## 2023-10-15 PROCEDURE — 99285 EMERGENCY DEPT VISIT HI MDM: CPT

## 2023-10-15 PROCEDURE — 93005 ELECTROCARDIOGRAM TRACING: CPT | Performed by: INTERNAL MEDICINE

## 2023-10-15 PROCEDURE — 71045 X-RAY EXAM CHEST 1 VIEW: CPT

## 2023-10-15 PROCEDURE — 2580000003 HC RX 258: Performed by: INTERNAL MEDICINE

## 2023-10-15 PROCEDURE — 93010 ELECTROCARDIOGRAM REPORT: CPT | Performed by: INTERNAL MEDICINE

## 2023-10-15 PROCEDURE — 80053 COMPREHEN METABOLIC PANEL: CPT

## 2023-10-15 PROCEDURE — 81001 URINALYSIS AUTO W/SCOPE: CPT

## 2023-10-15 PROCEDURE — 2500000003 HC RX 250 WO HCPCS: Performed by: INTERNAL MEDICINE

## 2023-10-15 PROCEDURE — 85025 COMPLETE CBC W/AUTO DIFF WBC: CPT

## 2023-10-15 PROCEDURE — 84443 ASSAY THYROID STIM HORMONE: CPT

## 2023-10-15 PROCEDURE — 82803 BLOOD GASES ANY COMBINATION: CPT

## 2023-10-15 PROCEDURE — 83735 ASSAY OF MAGNESIUM: CPT

## 2023-10-15 PROCEDURE — 6370000000 HC RX 637 (ALT 250 FOR IP): Performed by: INTERNAL MEDICINE

## 2023-10-15 PROCEDURE — 84484 ASSAY OF TROPONIN QUANT: CPT

## 2023-10-15 PROCEDURE — 93005 ELECTROCARDIOGRAM TRACING: CPT | Performed by: EMERGENCY MEDICINE

## 2023-10-15 PROCEDURE — 70450 CT HEAD/BRAIN W/O DYE: CPT

## 2023-10-15 PROCEDURE — 1200000000 HC SEMI PRIVATE

## 2023-10-15 PROCEDURE — 6360000002 HC RX W HCPCS: Performed by: INTERNAL MEDICINE

## 2023-10-15 PROCEDURE — 36415 COLL VENOUS BLD VENIPUNCTURE: CPT

## 2023-10-15 PROCEDURE — 87086 URINE CULTURE/COLONY COUNT: CPT

## 2023-10-15 RX ORDER — ACETAMINOPHEN 650 MG/1
650 SUPPOSITORY RECTAL EVERY 6 HOURS PRN
Status: DISCONTINUED | OUTPATIENT
Start: 2023-10-15 | End: 2023-10-20 | Stop reason: HOSPADM

## 2023-10-15 RX ORDER — ATORVASTATIN CALCIUM 20 MG/1
20 TABLET, FILM COATED ORAL NIGHTLY
Status: DISCONTINUED | OUTPATIENT
Start: 2023-10-15 | End: 2023-10-20 | Stop reason: HOSPADM

## 2023-10-15 RX ORDER — AMLODIPINE BESYLATE 5 MG/1
5 TABLET ORAL NIGHTLY
Status: DISCONTINUED | OUTPATIENT
Start: 2023-10-15 | End: 2023-10-20 | Stop reason: HOSPADM

## 2023-10-15 RX ORDER — ENOXAPARIN SODIUM 100 MG/ML
40 INJECTION SUBCUTANEOUS DAILY
Status: DISCONTINUED | OUTPATIENT
Start: 2023-10-16 | End: 2023-10-20 | Stop reason: HOSPADM

## 2023-10-15 RX ORDER — SODIUM CHLORIDE 0.9 % (FLUSH) 0.9 %
5-40 SYRINGE (ML) INJECTION EVERY 12 HOURS SCHEDULED
Status: DISCONTINUED | OUTPATIENT
Start: 2023-10-15 | End: 2023-10-20 | Stop reason: HOSPADM

## 2023-10-15 RX ORDER — POLYETHYLENE GLYCOL 3350 17 G/17G
17 POWDER, FOR SOLUTION ORAL DAILY PRN
Status: DISCONTINUED | OUTPATIENT
Start: 2023-10-15 | End: 2023-10-20 | Stop reason: HOSPADM

## 2023-10-15 RX ORDER — ONDANSETRON 2 MG/ML
4 INJECTION INTRAMUSCULAR; INTRAVENOUS EVERY 6 HOURS PRN
Status: DISCONTINUED | OUTPATIENT
Start: 2023-10-15 | End: 2023-10-20 | Stop reason: HOSPADM

## 2023-10-15 RX ORDER — ACETAMINOPHEN 325 MG/1
650 TABLET ORAL EVERY 6 HOURS PRN
Status: DISCONTINUED | OUTPATIENT
Start: 2023-10-15 | End: 2023-10-20 | Stop reason: HOSPADM

## 2023-10-15 RX ORDER — SODIUM CHLORIDE 0.9 % (FLUSH) 0.9 %
5-40 SYRINGE (ML) INJECTION PRN
Status: DISCONTINUED | OUTPATIENT
Start: 2023-10-15 | End: 2023-10-20 | Stop reason: HOSPADM

## 2023-10-15 RX ORDER — ONDANSETRON 4 MG/1
4 TABLET, ORALLY DISINTEGRATING ORAL EVERY 8 HOURS PRN
Status: DISCONTINUED | OUTPATIENT
Start: 2023-10-15 | End: 2023-10-20 | Stop reason: HOSPADM

## 2023-10-15 RX ORDER — SODIUM CHLORIDE 9 MG/ML
INJECTION, SOLUTION INTRAVENOUS PRN
Status: DISCONTINUED | OUTPATIENT
Start: 2023-10-15 | End: 2023-10-20 | Stop reason: HOSPADM

## 2023-10-15 RX ORDER — OLANZAPINE 5 MG/1
5 TABLET ORAL NIGHTLY PRN
Status: DISCONTINUED | OUTPATIENT
Start: 2023-10-15 | End: 2023-10-20 | Stop reason: HOSPADM

## 2023-10-15 RX ADMIN — CEFTRIAXONE 1000 MG: 1 INJECTION, POWDER, FOR SOLUTION INTRAMUSCULAR; INTRAVENOUS at 11:34

## 2023-10-15 RX ADMIN — ATORVASTATIN CALCIUM 20 MG: 20 TABLET, FILM COATED ORAL at 20:48

## 2023-10-15 RX ADMIN — MICONAZOLE NITRATE: 2 POWDER TOPICAL at 15:00

## 2023-10-15 RX ADMIN — OLANZAPINE 5 MG: 5 TABLET, FILM COATED ORAL at 21:53

## 2023-10-15 RX ADMIN — SODIUM CHLORIDE, PRESERVATIVE FREE 10 ML: 5 INJECTION INTRAVENOUS at 11:35

## 2023-10-15 RX ADMIN — AMLODIPINE BESYLATE 5 MG: 5 TABLET ORAL at 20:48

## 2023-10-15 RX ADMIN — MICONAZOLE NITRATE: 2 POWDER TOPICAL at 20:51

## 2023-10-15 ASSESSMENT — LIFESTYLE VARIABLES
HOW MANY STANDARD DRINKS CONTAINING ALCOHOL DO YOU HAVE ON A TYPICAL DAY: PATIENT DOES NOT DRINK
HOW OFTEN DO YOU HAVE A DRINK CONTAINING ALCOHOL: NEVER

## 2023-10-15 ASSESSMENT — PAIN - FUNCTIONAL ASSESSMENT: PAIN_FUNCTIONAL_ASSESSMENT: NONE - DENIES PAIN

## 2023-10-15 NOTE — ED NOTES
This pt's son has now arrived and is at bedside. Updated pt information is that this pt was found on the floor at home, by her . Son assisted with lifting pt from floor using a gait belt. Son found skin tear on left wrist/forearm and it has been bandaged. Son states pt was very confused while at home but currently appears to be back at her baseline. Physician advised that son is here and has better information about the situation. Physician was in another pt room and will return as soon as available.       Shaquille Arevalo RN  10/15/23 8079

## 2023-10-15 NOTE — PLAN OF CARE
Problem: Confusion  Goal: Confusion, delirium, dementia, or psychosis is improved or at baseline  Description: INTERVENTIONS:  1. Assess for possible contributors to thought disturbance, including medications, impaired vision or hearing, underlying metabolic abnormalities, dehydration, psychiatric diagnoses, and notify attending LIP  2. [place] high risk fall precautions, as indicated  3. Provide frequent short contacts to provide reality reorientation, refocusing and direction  4. Decrease environmental stimuli, including noise as appropriate  5. Monitor and intervene to maintain adequate nutrition, hydration, elimination, sleep and activity  6. If unable to ensure safety without constant attention obtain sitter and review sitter guidelines with assigned personnel  7. Initiate Psychosocial CNS and Spiritual Care consult, as indicated  10/15/2023 1529 by Toni Avery RN  Outcome: Adequate for Discharge  10/15/2023 1127 by Toni Avery RN  Outcome: Not Progressing     Problem: Safety - Adult  Goal: Free from fall injury  10/15/2023 1529 by Toni Avery RN  Outcome: Adequate for Discharge  10/15/2023 1127 by Toni Avery RN  Outcome: Not Progressing     Problem: ABCDS Injury Assessment  Goal: Absence of physical injury  10/15/2023 1529 by Toni Avery RN  Outcome: Adequate for Discharge  10/15/2023 1127 by Toni Avery RN  Outcome: Not Progressing     Problem: Discharge Planning  Goal: Discharge to home or other facility with appropriate resources  10/15/2023 1529 by Toni Avery RN  Outcome: Adequate for Discharge  10/15/2023 1127 by Toni Avery RN  Outcome: Not Progressing     Problem: Confusion  Goal: Confusion, delirium, dementia, or psychosis is improved or at baseline  Description: INTERVENTIONS:  1.  Assess for possible contributors to thought disturbance, including medications, impaired vision or hearing, underlying metabolic abnormalities, dehydration, psychiatric

## 2023-10-15 NOTE — PROGRESS NOTES
Patient admitted to room 4127. Alert to person only. Screaming, agitated, non redirectable. Two sons at bedside. Assessment and admission complete. Incontinent of urine. Unable to cooperate to ambulate to restroom. Purewick placed. Remote telemetry placed, sinus rhythm rate 81. Per family patient ambulates with walker at home. No complaints of pain. Moist excoriation under bilateral breasts and groin. Interdry placed. Laceration from fall to LUE, mepolex placed. MD at bedside. Wound care consult placed. Patient oriented to room and call light use. Bed locked in lowest position, call light in reach. Bed alarm on. Family at bedside.

## 2023-10-15 NOTE — PLAN OF CARE
Problem: Discharge Planning  Goal: Discharge to home or other facility with appropriate resources  10/15/2023 1529 by Alex Frederick RN  Outcome: Adequate for Discharge  10/15/2023 1127 by Alex Frederick RN  Outcome: Not Progressing     Problem: Confusion  Goal: Confusion, delirium, dementia, or psychosis is improved or at baseline  Description: INTERVENTIONS:  1. Assess for possible contributors to thought disturbance, including medications, impaired vision or hearing, underlying metabolic abnormalities, dehydration, psychiatric diagnoses, and notify attending LIP  2. [place] high risk fall precautions, as indicated  3. Provide frequent short contacts to provide reality reorientation, refocusing and direction  4. Decrease environmental stimuli, including noise as appropriate  5. Monitor and intervene to maintain adequate nutrition, hydration, elimination, sleep and activity  6. If unable to ensure safety without constant attention obtain sitter and review sitter guidelines with assigned personnel  7. Initiate Psychosocial CNS and Spiritual Care consult, as indicated  10/15/2023 1529 by Alex Frederick RN  Outcome: Adequate for Discharge  10/15/2023 1127 by Alex Frederick RN  Outcome: Not Progressing     Problem: Confusion  Goal: Confusion, delirium, dementia, or psychosis is improved or at baseline  Description: INTERVENTIONS:  1. Assess for possible contributors to thought disturbance, including medications, impaired vision or hearing, underlying metabolic abnormalities, dehydration, psychiatric diagnoses, and notify attending LIP  2. [place] high risk fall precautions, as indicated  3. Provide frequent short contacts to provide reality reorientation, refocusing and direction  4. Decrease environmental stimuli, including noise as appropriate  5. Monitor and intervene to maintain adequate nutrition, hydration, elimination, sleep and activity  6.  If unable to ensure safety without constant attention refocusing and direction  4. Decrease environmental stimuli, including noise as appropriate  5. Monitor and intervene to maintain adequate nutrition, hydration, elimination, sleep and activity  6. If unable to ensure safety without constant attention obtain sitter and review sitter guidelines with assigned personnel  7. Initiate Psychosocial CNS and Spiritual Care consult, as indicated  10/15/2023 1529 by Mini Landon RN  Outcome: Adequate for Discharge  10/15/2023 1127 by Mini Landon RN  Outcome: Not Progressing     Problem: Skin/Tissue Integrity  Goal: Absence of new skin breakdown  Description: 1. Monitor for areas of redness and/or skin breakdown  2. Assess vascular access sites hourly  3. Every 4-6 hours minimum:  Change oxygen saturation probe site  4. Every 4-6 hours:  If on nasal continuous positive airway pressure, respiratory therapy assess nares and determine need for appliance change or resting period.   10/15/2023 1529 by Mini Landon RN  Outcome: Adequate for Discharge  10/15/2023 1127 by Mini Landon RN  Outcome: Not Progressing     Problem: Safety - Adult  Goal: Free from fall injury  10/15/2023 1529 by Mini Landon RN  Outcome: Adequate for Discharge  10/15/2023 1127 by Mini Landon RN  Outcome: Not Progressing     Problem: ABCDS Injury Assessment  Goal: Absence of physical injury  10/15/2023 1529 by Mini Landon RN  Outcome: Adequate for Discharge  10/15/2023 1127 by Mini Landon RN  Outcome: Not Progressing

## 2023-10-15 NOTE — H&P
V2.0  History and Physical      Name:  Cora Lamas /Age/Sex: 1933  (80 y.o. female)   MRN & CSN:  [id number] & [id number] Encounter Date/Time: 10/15/2023 9:32 AM EDT   Location:  [facility] PCP: Ethel Essex, [facility] [address] [facility] Day: 1    Assessment and Plan:   Cora Lamas is a 80 y.o. female with a pmh of memory issues, hypertension, hyperlipidemia who presents with Acute metabolic encephalopathy and UTI    Hospital Problems             Last Modified POA    * (Principal) Acute metabolic encephalopathy  Yes       Plan:    Acute metabolic encephalopathy  -Patient has cognitive impairment and memory issues at baseline but is functioning, with wandering at night. Patient was more confused and was found on the floor  -Discussed with family about concerns for sundowning in the hospital.  Family states that last admission patient got opiates after which she was very very confused and needed a prolonged stay. Family requesting not to give her medications if possible for agitation. Discussed with family if patient is risk to self or others she will need medications. UTI  -Started on ceftriaxone. Will wait for culture    Frequent falls  -Patient had a fall on 10/6 and went to [address] [place] [address] and had sutures placed  -PT OT consulted    Mildly elevated troponin with left bundle branch block  -Chart reviewed from [address] [place] [address]. Patient has a chronic left bundle branch block  -Troponin is stable around 30  We will repeat  -Patient had a remote echo in 2017  -If troponin trends up will consult cardiology    Hypertension  -Monitor blood pressure and continue amlodipine. With mildly elevated urea. Will hold diuretic    Hyperlipidemia  -Continue statin    Fungal infection/excoriation-miconazole powder    ED discussed goals of care with family.   Patient is a full code    Disposition:   Current Living situation: Home  Expected Disposition: Awaiting therapy recommendation  Estimated D/C: 1 to 2 days    Diet No

## 2023-10-15 NOTE — PLAN OF CARE
Problem: Discharge Planning  Goal: Discharge to home or other facility with appropriate resources  Outcome: Not Progressing     Problem: Confusion  Goal: Confusion, delirium, dementia, or psychosis is improved or at baseline  Description: INTERVENTIONS:  1. Assess for possible contributors to thought disturbance, including medications, impaired vision or hearing, underlying metabolic abnormalities, dehydration, psychiatric diagnoses, and notify attending LIP  2. [place] high risk fall precautions, as indicated  3. Provide frequent short contacts to provide reality reorientation, refocusing and direction  4. Decrease environmental stimuli, including noise as appropriate  5. Monitor and intervene to maintain adequate nutrition, hydration, elimination, sleep and activity  6. If unable to ensure safety without constant attention obtain sitter and review sitter guidelines with assigned personnel  7. Initiate Psychosocial CNS and Spiritual Care consult, as indicated  Outcome: Not Progressing     Problem: Skin/Tissue Integrity  Goal: Absence of new skin breakdown  Description: 1. Monitor for areas of redness and/or skin breakdown  2. Assess vascular access sites hourly  3. Every 4-6 hours minimum:  Change oxygen saturation probe site  4. Every 4-6 hours:  If on nasal continuous positive airway pressure, respiratory therapy assess nares and determine need for appliance change or resting period. Outcome: Not Progressing     Problem: Safety - Adult  Goal: Free from fall injury  Outcome: Not Progressing     Problem: ABCDS Injury Assessment  Goal: Absence of physical injury  Outcome: Not Progressing     Problem: Discharge Planning  Goal: Discharge to home or other facility with appropriate resources  Outcome: Not Progressing     Problem: Confusion  Goal: Confusion, delirium, dementia, or psychosis is improved or at baseline  Description: INTERVENTIONS:  1.  Assess for possible contributors to thought disturbance,

## 2023-10-15 NOTE — PROGRESS NOTES
Patient agitated, confused, restless. Attempting to get out of bed. Unable to be redirected. Son at bedside. Charge RN aware. Sitter placed at bedside for patient safety. Family aware.

## 2023-10-15 NOTE — ED NOTES
Pt resting in bed at this time, laying in a supine position with head of bed elevated . Call light remains in reach instructed pt how to use, and encouraged pt to call if needed assistance, no distress noted. RR even and unlabored, skin warm and dry. No needs at this time. Will continue to monitor closely.   Family remains at bedside      Elizabeth Balderas RN  10/15/23 0611

## 2023-10-15 NOTE — ED NOTES
Pt resting in bed at this time, laying in a supine position with head of bed elevated . Call light remains in reach instructed pt how to use, and encouraged pt to call if needed assistance, no distress noted. RR even and unlabored, skin warm and dry. No needs at this time. Will continue to monitor closely.   Family remains at bedside      Mark Miller RN  10/15/23 2358

## 2023-10-15 NOTE — ED NOTES
Report given to Agustín Barrera, ED RN to assume care. All questions and concerns answered.       Renato Campoverde, RN  10/15/23 6531

## 2023-10-15 NOTE — ED NOTES
Report called to      Love   RN   To Room  4228  Cardiac monitor on during transfer  Pt's pain level   denies  VSS, Afebrile   IV site is clean, dry and intact, Normal saline locked   Family updated on transfer at bedside          [place], [place]  10/15/23 9385

## 2023-10-16 LAB
ANION GAP SERPL CALCULATED.3IONS-SCNC: 13 MMOL/L (ref 3–16)
BACTERIA UR CULT: NORMAL
BASOPHILS # BLD: 0 K/UL (ref 0–0.2)
BASOPHILS NFR BLD: 0.3 %
BUN SERPL-MCNC: 22 MG/DL (ref 7–20)
CALCIUM SERPL-MCNC: 9.1 MG/DL (ref 8.3–10.6)
CHLORIDE SERPL-SCNC: 103 MMOL/L (ref 99–110)
CO2 SERPL-SCNC: 23 MMOL/L (ref 21–32)
CREAT SERPL-MCNC: 0.7 MG/DL (ref 0.6–1.2)
DEPRECATED RDW RBC AUTO: 14.9 % (ref 12.4–15.4)
EKG ATRIAL RATE: 81 BPM
EKG DIAGNOSIS: NORMAL
EKG P AXIS: 55 DEGREES
EKG P-R INTERVAL: 180 MS
EKG Q-T INTERVAL: 418 MS
EKG QRS DURATION: 156 MS
EKG QTC CALCULATION (BAZETT): 485 MS
EKG R AXIS: 8 DEGREES
EKG T AXIS: 159 DEGREES
EKG VENTRICULAR RATE: 81 BPM
EOSINOPHIL # BLD: 0.1 K/UL (ref 0–0.6)
EOSINOPHIL NFR BLD: 0.6 %
GFR SERPLBLD CREATININE-BSD FMLA CKD-EPI: >60 ML/MIN/{1.73_M2}
GLUCOSE SERPL-MCNC: 144 MG/DL (ref 70–99)
HCT VFR BLD AUTO: 33.2 % (ref 36–48)
HGB BLD-MCNC: 10.9 G/DL (ref 12–16)
LYMPHOCYTES # BLD: 0.7 K/UL (ref 1–5.1)
LYMPHOCYTES NFR BLD: 5.1 %
MCH RBC QN AUTO: 29.3 PG (ref 26–34)
MCHC RBC AUTO-ENTMCNC: 32.9 G/DL (ref 31–36)
MCV RBC AUTO: 89.2 FL (ref 80–100)
MONOCYTES # BLD: 1 K/UL (ref 0–1.3)
MONOCYTES NFR BLD: 7 %
NEUTROPHILS # BLD: 12.3 K/UL (ref 1.7–7.7)
NEUTROPHILS NFR BLD: 87 %
PLATELET # BLD AUTO: 275 K/UL (ref 135–450)
PMV BLD AUTO: 9.4 FL (ref 5–10.5)
POTASSIUM SERPL-SCNC: 3.7 MMOL/L (ref 3.5–5.1)
RBC # BLD AUTO: 3.73 M/UL (ref 4–5.2)
SODIUM SERPL-SCNC: 139 MMOL/L (ref 136–145)
WBC # BLD AUTO: 14.2 K/UL (ref 4–11)

## 2023-10-16 PROCEDURE — 2580000003 HC RX 258: Performed by: INTERNAL MEDICINE

## 2023-10-16 PROCEDURE — 97166 OT EVAL MOD COMPLEX 45 MIN: CPT

## 2023-10-16 PROCEDURE — 80048 BASIC METABOLIC PNL TOTAL CA: CPT

## 2023-10-16 PROCEDURE — 6370000000 HC RX 637 (ALT 250 FOR IP): Performed by: INTERNAL MEDICINE

## 2023-10-16 PROCEDURE — 97530 THERAPEUTIC ACTIVITIES: CPT

## 2023-10-16 PROCEDURE — 93010 ELECTROCARDIOGRAM REPORT: CPT | Performed by: INTERNAL MEDICINE

## 2023-10-16 PROCEDURE — 97162 PT EVAL MOD COMPLEX 30 MIN: CPT

## 2023-10-16 PROCEDURE — 1200000000 HC SEMI PRIVATE

## 2023-10-16 PROCEDURE — 6360000002 HC RX W HCPCS: Performed by: INTERNAL MEDICINE

## 2023-10-16 PROCEDURE — 97116 GAIT TRAINING THERAPY: CPT

## 2023-10-16 PROCEDURE — 36415 COLL VENOUS BLD VENIPUNCTURE: CPT

## 2023-10-16 PROCEDURE — 97535 SELF CARE MNGMENT TRAINING: CPT

## 2023-10-16 PROCEDURE — 85025 COMPLETE CBC W/AUTO DIFF WBC: CPT

## 2023-10-16 RX ADMIN — SODIUM CHLORIDE, PRESERVATIVE FREE 10 ML: 5 INJECTION INTRAVENOUS at 21:11

## 2023-10-16 RX ADMIN — CEFTRIAXONE 1000 MG: 1 INJECTION, POWDER, FOR SOLUTION INTRAMUSCULAR; INTRAVENOUS at 10:35

## 2023-10-16 RX ADMIN — ENOXAPARIN SODIUM 40 MG: 100 INJECTION SUBCUTANEOUS at 10:23

## 2023-10-16 RX ADMIN — ACETAMINOPHEN 650 MG: 325 TABLET ORAL at 10:23

## 2023-10-16 RX ADMIN — ATORVASTATIN CALCIUM 20 MG: 20 TABLET, FILM COATED ORAL at 21:11

## 2023-10-16 RX ADMIN — MICONAZOLE NITRATE: 2 POWDER TOPICAL at 21:17

## 2023-10-16 RX ADMIN — AMLODIPINE BESYLATE 5 MG: 5 TABLET ORAL at 21:11

## 2023-10-16 RX ADMIN — MICONAZOLE NITRATE: 2 POWDER TOPICAL at 10:23

## 2023-10-16 ASSESSMENT — PAIN SCALES - GENERAL: PAINLEVEL_OUTOF10: 4

## 2023-10-16 NOTE — PROGRESS NOTES
V2.0    [facility] Progress Note      Name:  Jenna Harris /Age/Sex: 1933  (80 y.o. female)   MRN & CSN:  [id number] & [id number] Encounter Date/Time: 10/16/2023 12:17 PM EDT   Location:  [place] [id number] PCP: Araceli Clarke DO     Attending: Rafael Tadeo MD       Hospital Day: 2    Assessment and Recommendations   Jenna Harris is a 80 y.o. female who presents with Acute metabolic encephalopathy      Plan:   Acute metabolic encephalopathy on top of cognitive impairment and memory issues, fluctuating confusion on admission found to be on the floor. Encourage family conversation with patient. Avoid medication as much as possible  UTI patient on ceftriaxone, this is associated with leukocytosis, WBC 14.2 this morning  Frequent falls PT OT  Minimally elevated troponin and left bundle branch block which appears chronic troponin flat  Essential hypertension p.o. medication  Hyperlipidemia on statin to be continued      Diet ADULT DIET; Regular   DVT Prophylaxis [] Lovenox, []  Heparin, [] SCDs, [] Ambulation,  [] Eliquis, [] Xarelto  [] Coumadin   Code Status Full Code             Personally reviewed Lab Studies and Imaging     Discussed management of the case with nursing staff        Drugs that require monitoring for toxicity include antibiotics and the method of monitoring was diarrhea    Medical Decision Making: The following items were considered in medical decision making:  Discussion of patient care with other providers  Reviewed clinical lab tests  Reviewed radiology tests  Reviewed other diagnostic tests/interventions  Independent review of radiologic images  Microbiology cultures and other micro tests reviewed      Subjective:     Chief Complaint: Altered mental status    Jenna Harris is a 80 y.o. female who presents with altered mental status, fluctuating, not at baseline, no chest pain shortness of breath.   2 sons at bedside and supportive      Review of Systems:      Pertinent positives please feel free to contact the dictating provider for clarification.

## 2023-10-16 NOTE — PLAN OF CARE
Problem: Confusion  Goal: Confusion, delirium, dementia, or psychosis is improved or at baseline  Description: INTERVENTIONS:  1. Assess for possible contributors to thought disturbance, including medications, impaired vision or hearing, underlying metabolic abnormalities, dehydration, psychiatric diagnoses, and notify attending LIP  2. [place] high risk fall precautions, as indicated  3. Provide frequent short contacts to provide reality reorientation, refocusing and direction  4. Decrease environmental stimuli, including noise as appropriate  5. Monitor and intervene to maintain adequate nutrition, hydration, elimination, sleep and activity  6. If unable to ensure safety without constant attention obtain sitter and review sitter guidelines with assigned personnel  7. Initiate Psychosocial CNS and Spiritual Care consult, as indicated  Outcome: Progressing     Problem: Skin/Tissue Integrity  Goal: Absence of new skin breakdown  Description: 1. Monitor for areas of redness and/or skin breakdown  2. Assess vascular access sites hourly  3. Every 4-6 hours minimum:  Change oxygen saturation probe site  4. Every 4-6 hours:  If on nasal continuous positive airway pressure, respiratory therapy assess nares and determine need for appliance change or resting period.   Outcome: Progressing

## 2023-10-16 NOTE — PROGRESS NOTES
Occupational Therapy  Facility/Department: [facility]  Occupational Therapy Initial Assessment    Name: Cammie Gross  : 1933  MRN: [id number]  Date of Service: 10/16/2023    Discharge Recommendations:  Patient would benefit from continued therapy after discharge, 3-5 sessions per week, Other (comment) (son reported he wanted [address], but after provided w/ recommendation, he reported he would speak w/ family members)  OT Equipment Recommendations  Equipment Needed: No  Other: defer to d/c facility  Cammie Gross scored a 13/24 on the AM-PAC ADL Inpatient form. Current research shows that an AM-PAC score of 17 or less is typically not associated with a discharge to the patient's home setting. Based on the patient's AM-PAC score and their current ADL deficits, it is recommended that the patient have 3-5 sessions per week of Occupational Therapy at d/c to increase the patient's independence. Please see assessment section for further patient specific details. If patient discharges prior to next session this note will serve as a discharge summary. Please see below for the latest assessment towards goals. Patient Diagnosis(es): The primary encounter diagnosis was Urinary tract infection without hematuria, site unspecified. Diagnoses of Altered mental status, unspecified altered mental status type, Frequent falls, and Goals of care, counseling/discussion were also pertinent to this visit. Past Medical History:  has a past medical history of Arthritis, Forgetfulness, Hx of blood clots, Hyperlipidemia, Hypertension, and Wears glasses. Past Surgical History:  has a past surgical history that includes Cholecystectomy; Hysterectomy, total abdominal; hernia repair; and Rectal prolapse repair (N/A, 2022). Treatment Diagnosis: decreased fxl transfers/mobility and ADL status      Assessment   Performance deficits / Impairments: Decreased functional mobility ; Decreased balance;Decreased

## 2023-10-16 NOTE — ACP (ADVANCE CARE PLANNING)
Advance Care Planning     Advance Care Planning Activator (Inpatient)  Conversation Note      Date of ACP Conversation: 10/16/2023     Conversation Conducted with: Patient with Decision Making Capacity    ACP Activator: Dewey Newman RN      Health Care Decision Maker:     Current Designated Health Care Decision Maker:     Primary Decision Maker: Dima Red Spouse - [phone]    Secondary Decision Maker: Yakov Frias Child - [phone]    Care Preferences    Ventilation: \"If you were in your present state of health and suddenly became very ill and were unable to breathe on your own, what would your preference be about the use of a ventilator (breathing machine) if it were available to you? \"      Would the patient desire the use of ventilator (breathing machine)?: yes    \"If your health worsens and it becomes clear that your chance of recovery is unlikely, what would your preference be about the use of a ventilator (breathing machine) if it were available to you? \"     Would the patient desire the use of ventilator (breathing machine)?: No      Resuscitation  \"CPR works best to restart the heart when there is a sudden event, like a heart attack, in someone who is otherwise healthy. Unfortunately, CPR does not typically restart the heart for people who have serious health conditions or who are very sick. \"    \"In the event your heart stopped as a result of an underlying serious health condition, would you want attempts to be made to restart your heart (answer \"yes\" for attempt to resuscitate) or would you prefer a natural death (answer \"no\" for do not attempt to resuscitate)? \" yes       [] Yes   [x] No   Educated Patient / Dawood Santillan regarding differences between Advance Directives and portable DNR orders.     Length of ACP Conversation in minutes:  3    Conversation Outcomes:  Existing advance directive reviewed with patient; no changes to patient's previously recorded wishes    Follow-up plan:    [] Schedule follow-up conversation to continue planning  [] Referred individual to Provider for additional questions/concerns   [] Advised patient/agent/surrogate to review completed ACP document and update if needed with changes in condition, patient preferences or care setting    [x] This note routed to one or more involved healthcare providers

## 2023-10-16 NOTE — PLAN OF CARE
Problem: Confusion  Goal: Confusion, delirium, dementia, or psychosis is improved or at baseline  Description: INTERVENTIONS:  1. Assess for possible contributors to thought disturbance, including medications, impaired vision or hearing, underlying metabolic abnormalities, dehydration, psychiatric diagnoses, and notify attending LIP  2. [place] high risk fall precautions, as indicated  3. Provide frequent short contacts to provide reality reorientation, refocusing and direction  4. Decrease environmental stimuli, including noise as appropriate  5. Monitor and intervene to maintain adequate nutrition, hydration, elimination, sleep and activity  6. If unable to ensure safety without constant attention obtain sitter and review sitter guidelines with assigned personnel  7. Initiate Psychosocial CNS and Spiritual Care consult, as indicated  10/15/2023 2250 by Sharif Wagner RN  Outcome: Progressing     Problem: Skin/Tissue Integrity  Goal: Absence of new skin breakdown  Description: 1. Monitor for areas of redness and/or skin breakdown  2. Assess vascular access sites hourly  3. Every 4-6 hours minimum:  Change oxygen saturation probe site  4. Every 4-6 hours:  If on nasal continuous positive airway pressure, respiratory therapy assess nares and determine need for appliance change or resting period. Outcome: Progressing     Problem: Discharge Planning  Goal: Discharge to home or other facility with appropriate resources  10/15/2023 1529 by Gilma Youngblood RN  Outcome: Adequate for Discharge  10/15/2023 1127 by Gilma Youngblood RN  Outcome: Not Progressing     Problem: Confusion  Goal: Confusion, delirium, dementia, or psychosis is improved or at baseline  Description: INTERVENTIONS:  1. Assess for possible contributors to thought disturbance, including medications, impaired vision or hearing, underlying metabolic abnormalities, dehydration, psychiatric diagnoses, and notify attending LIP  2.  [place] high risk fall precautions, as indicated  3. Provide frequent short contacts to provide reality reorientation, refocusing and direction  4. Decrease environmental stimuli, including noise as appropriate  5. Monitor and intervene to maintain adequate nutrition, hydration, elimination, sleep and activity  6. If unable to ensure safety without constant attention obtain sitter and review sitter guidelines with assigned personnel  7. Initiate Psychosocial CNS and Spiritual Care consult, as indicated  10/15/2023 1529 by Nuzhat Arechiga RN  Outcome: Adequate for Discharge  10/15/2023 1127 by Nuzhat Arechiga RN  Outcome: Not Progressing     Problem: Skin/Tissue Integrity  Goal: Absence of new skin breakdown  Description: 1. Monitor for areas of redness and/or skin breakdown  2. Assess vascular access sites hourly  3. Every 4-6 hours minimum:  Change oxygen saturation probe site  4. Every 4-6 hours:  If on nasal continuous positive airway pressure, respiratory therapy assess nares and determine need for appliance change or resting period.   10/15/2023 1529 by Nuzhat Arechiga RN  Outcome: Adequate for Discharge  10/15/2023 1127 by Nuzhat Arechiga RN  Outcome: Not Progressing     Problem: Safety - Adult  Goal: Free from fall injury  10/15/2023 1529 by Nuzhat Arechiga RN  Outcome: Adequate for Discharge  10/15/2023 1127 by Nuzhat Arechiga RN  Outcome: Not Progressing     Problem: ABCDS Injury Assessment  Goal: Absence of physical injury  10/15/2023 1529 by Nuzhat Arechiga RN  Outcome: Adequate for Discharge  10/15/2023 1127 by Nuzhat Arechiga RN  Outcome: Not Progressing

## 2023-10-16 NOTE — PROGRESS NOTES
Physical Therapy  Facility/Department: [address] MED SURG  Physical Therapy Initial Assessment  (Co-eval)  If patient discharges prior to next session this note will serve as a discharge summary. Please see below for the latest assessment towards goals. Name: Ha Pereira  : 1933  MRN: [id number]  Date of Service: 10/16/2023    Discharge Recommendations:  Patient would benefit from continued therapy after discharge (3-5)   Ha Pereira scored a  on the AM-PAC short mobility form. Current research shows that an AM-PAC score of 17 or less is typically not associated with a discharge to the patient's home setting. Based on the patient's AM-PAC score and their current functional mobility deficits, it is recommended that the patient have 3-5 sessions per week of Physical Therapy at d/c to increase the patient's independence. Please see assessment section for further patient specific details. PT Equipment Recommendations  Equipment Needed: No  Other: defer      Patient Diagnosis(es): The primary encounter diagnosis was Urinary tract infection without hematuria, site unspecified. Diagnoses of Altered mental status, unspecified altered mental status type, Frequent falls, and Goals of care, counseling/discussion were also pertinent to this visit. Past Medical History:  has a past medical history of Arthritis, Forgetfulness, Hx of blood clots, Hyperlipidemia, Hypertension, and Wears glasses. Past Surgical History:  has a past surgical history that includes Cholecystectomy; Hysterectomy, total abdominal; hernia repair; and Rectal prolapse repair (N/A, 2022). Assessment   Assessment: The pt is a 79 yo female who presented to the ED with worsening confusion and falls. Her confusion has been associated with hallucinations and some agitation although at baseline she wanders at night.  In the ED: CT of the head no hemorrhage; EKG with LBBB  The pt lives with her elderly spouse in a house with a stair No  Ambulation Assistance:  (with rollator)  Active : No  Patient's  Info:   Leisure & Hobbies: the pt's son reports he does exercises with her everyday including mind games  Additional Comments: Information gathered from previous evaluation back in 2/2022. Sons present and able to confirm information  Vision/Hearing  Vision  Vision: Within Functional Limits  Hearing  Hearing: Within functional limits    Cognition   Orientation  Orientation Level: Oriented to person  Cognition  Overall Cognitive Status: Exceptions  Arousal/Alertness: Inconsistent responses to stimuli  Following Commands: Inconsistently follows commands  Attention Span: Attends with cues to redirect  Memory: Decreased short term memory  Problem Solving: Decreased awareness of errors  Initiation: Requires cues for some  Sequencing: Requires cues for some  Cognition Comment: anxious w/ fxl transfers/mobility     Objective           Gross Assessment  AROM: Generally decreased, functional  Strength: Generally decreased, functional (difficulty following directions for MMT but appears at least functionally fair)     Bed mobility  Supine to Sit: Moderate assistance (HOB elevated, increased time and vc's)  Sit to Supine: Unable to assess (the pt up to the chair)  Scooting: Moderate assistance;2 Person assistance    Transfers  Sit to Stand: Moderate Assistance;2 Person Assistance  Stand to Sit: Moderate Assistance;2 Person Assistance  Bed to Chair: Dependent/Total (used the stedy to get the pt from the commode > recliner)    Ambulation  Surface: Level tile  Device: Rollator  Assistance:  Moderate assistance;2 Person assistance  Quality of Gait: the pt with initially a slight lean posteriorly but with fatigue became more pronounced; the pt needing at least mod A to manage the rollator especially on turns; became easily fatigued with walk to the toilet but needed the stedy to get to the chair after using the commode  Distance: 10 feet x

## 2023-10-17 LAB
ALBUMIN SERPL-MCNC: 3.2 G/DL (ref 3.4–5)
ALBUMIN/GLOB SERPL: 1.3 {RATIO} (ref 1.1–2.2)
ALP SERPL-CCNC: 49 U/L (ref 40–129)
ALT SERPL-CCNC: 10 U/L (ref 10–40)
ANION GAP SERPL CALCULATED.3IONS-SCNC: 12 MMOL/L (ref 3–16)
AST SERPL-CCNC: 11 U/L (ref 15–37)
BASOPHILS # BLD: 0.1 K/UL (ref 0–0.2)
BASOPHILS NFR BLD: 0.6 %
BILIRUB SERPL-MCNC: 0.5 MG/DL (ref 0–1)
BUN SERPL-MCNC: 21 MG/DL (ref 7–20)
CALCIUM SERPL-MCNC: 8.9 MG/DL (ref 8.3–10.6)
CHLORIDE SERPL-SCNC: 103 MMOL/L (ref 99–110)
CO2 SERPL-SCNC: 24 MMOL/L (ref 21–32)
CREAT SERPL-MCNC: 0.6 MG/DL (ref 0.6–1.2)
DEPRECATED RDW RBC AUTO: 14.7 % (ref 12.4–15.4)
EOSINOPHIL # BLD: 0.2 K/UL (ref 0–0.6)
EOSINOPHIL NFR BLD: 1.6 %
GFR SERPLBLD CREATININE-BSD FMLA CKD-EPI: >60 ML/MIN/{1.73_M2}
GLUCOSE SERPL-MCNC: 103 MG/DL (ref 70–99)
HCT VFR BLD AUTO: 31.6 % (ref 36–48)
HGB BLD-MCNC: 10.6 G/DL (ref 12–16)
LYMPHOCYTES # BLD: 0.9 K/UL (ref 1–5.1)
LYMPHOCYTES NFR BLD: 8 %
MCH RBC QN AUTO: 29.9 PG (ref 26–34)
MCHC RBC AUTO-ENTMCNC: 33.7 G/DL (ref 31–36)
MCV RBC AUTO: 88.7 FL (ref 80–100)
MONOCYTES # BLD: 0.9 K/UL (ref 0–1.3)
MONOCYTES NFR BLD: 8.1 %
NEUTROPHILS # BLD: 8.7 K/UL (ref 1.7–7.7)
NEUTROPHILS NFR BLD: 81.7 %
PLATELET # BLD AUTO: 240 K/UL (ref 135–450)
PMV BLD AUTO: 9.2 FL (ref 5–10.5)
POTASSIUM SERPL-SCNC: 3.6 MMOL/L (ref 3.5–5.1)
PROT SERPL-MCNC: 5.6 G/DL (ref 6.4–8.2)
RBC # BLD AUTO: 3.56 M/UL (ref 4–5.2)
SODIUM SERPL-SCNC: 139 MMOL/L (ref 136–145)
TSH SERPL DL<=0.005 MIU/L-ACNC: 1.19 UIU/ML (ref 0.27–4.2)
WBC # BLD AUTO: 10.7 K/UL (ref 4–11)

## 2023-10-17 PROCEDURE — 80053 COMPREHEN METABOLIC PANEL: CPT

## 2023-10-17 PROCEDURE — 85025 COMPLETE CBC W/AUTO DIFF WBC: CPT

## 2023-10-17 PROCEDURE — 84443 ASSAY THYROID STIM HORMONE: CPT

## 2023-10-17 PROCEDURE — 97116 GAIT TRAINING THERAPY: CPT

## 2023-10-17 PROCEDURE — 97535 SELF CARE MNGMENT TRAINING: CPT

## 2023-10-17 PROCEDURE — 6360000002 HC RX W HCPCS: Performed by: INTERNAL MEDICINE

## 2023-10-17 PROCEDURE — 2580000003 HC RX 258: Performed by: INTERNAL MEDICINE

## 2023-10-17 PROCEDURE — 97530 THERAPEUTIC ACTIVITIES: CPT

## 2023-10-17 PROCEDURE — 6370000000 HC RX 637 (ALT 250 FOR IP): Performed by: INTERNAL MEDICINE

## 2023-10-17 PROCEDURE — 36415 COLL VENOUS BLD VENIPUNCTURE: CPT

## 2023-10-17 PROCEDURE — 1200000000 HC SEMI PRIVATE

## 2023-10-17 PROCEDURE — 94760 N-INVAS EAR/PLS OXIMETRY 1: CPT

## 2023-10-17 RX ADMIN — MICONAZOLE NITRATE: 2 POWDER TOPICAL at 09:11

## 2023-10-17 RX ADMIN — CEFTRIAXONE 1000 MG: 1 INJECTION, POWDER, FOR SOLUTION INTRAMUSCULAR; INTRAVENOUS at 11:29

## 2023-10-17 RX ADMIN — ACETAMINOPHEN 650 MG: 325 TABLET ORAL at 09:11

## 2023-10-17 RX ADMIN — ATORVASTATIN CALCIUM 20 MG: 20 TABLET, FILM COATED ORAL at 23:04

## 2023-10-17 RX ADMIN — ACETAMINOPHEN 650 MG: 325 TABLET ORAL at 18:15

## 2023-10-17 RX ADMIN — SODIUM CHLORIDE, PRESERVATIVE FREE 10 ML: 5 INJECTION INTRAVENOUS at 23:04

## 2023-10-17 RX ADMIN — AMLODIPINE BESYLATE 5 MG: 5 TABLET ORAL at 23:03

## 2023-10-17 RX ADMIN — ENOXAPARIN SODIUM 40 MG: 100 INJECTION SUBCUTANEOUS at 09:11

## 2023-10-17 RX ADMIN — MICONAZOLE NITRATE: 2 POWDER TOPICAL at 23:04

## 2023-10-17 NOTE — PROGRESS NOTES
V2.0    [facility] Progress Note      Name:  Melani Mulligan /Age/Sex: 1933  (80 y.o. female)   MRN & CSN:  [id number] & [id number] Encounter Date/Time: 10/17/2023 7:13 AM EDT   Location:  G1K-1337/4127-01 PCP: Karyna Hernandez DO     Attending: Tejas Jones MD       Hospital Day: 3    Assessment and Recommendations   Melani Mulligan is a 80 y.o. female who presents with Acute metabolic encephalopathy      Plan:     Acute metabolic encephalopathy on top of cognitive impairment and memory issues, fluctuating confusion on admission found to be on the floor. Encourage family conversation with patient. Avoid medication as much as possible  UTI patient on ceftriaxone, this is associated with leukocytosis, WBC was 14.2 yesterday down to 10.7 this morning  Minimally elevated troponin and left bundle branch block with suspected episodes of AV block during the night, which appears chronic troponin flat. Essential hypertension p.o. medication  Hyperlipidemia on statin to be continued        Diet ADULT DIET; Regular   DVT Prophylaxis [] Lovenox, []  Heparin, [] SCDs, [] Ambulation,  [] Eliquis, [] Xarelto  [] Coumadin   Code Status Full Code             Personally reviewed Lab Studies and Imaging       Telemetry strip reviewed by myself, sinus rhythm no ST elevation      Drugs that require monitoring for toxicity include antibiotics and the method of monitoring was diarrhea    Medical Decision Making:   The following items were considered in medical decision making:  Discussion of patient care with other providers  Reviewed clinical lab tests  Reviewed radiology tests  Reviewed other diagnostic tests/interventions  Independent review of radiologic images  Microbiology cultures and other micro tests reviewed      Subjective:     Chief Complaint: Confusion    Melani Mulligan is a 80 y.o. female who presents with confusion weakness fluctuating family involved in care and supportive      Review of Systems:      Pertinent CONTRAST    Result Date: 10/15/2023  EXAMINATION: CT OF THE HEAD WITHOUT CONTRAST  10/15/2023 4:26 am TECHNIQUE: CT of the head was performed without the administration of intravenous contrast. Automated exposure control, iterative reconstruction, and/or weight based adjustment of the mA/kV was utilized to reduce the radiation dose to as low as reasonably achievable. COMPARISON: None. HISTORY: ORDERING SYSTEM PROVIDED HISTORY: altered mental status TECHNOLOGIST PROVIDED HISTORY: Reason for exam:->altered mental status Has a \"code stroke\" or \"stroke alert\" been called? ->No Decision Support Exception - unselect if not a suspected or confirmed emergency medical condition->Emergency Medical Condition (MA) Reason for Exam: ams FINDINGS: BRAIN/VENTRICLES: There is no acute intracranial hemorrhage, mass effect or midline shift. No abnormal extra-axial fluid collection. Cortical atrophy and chronic white matter changes in the brain and associated ventricular enlargement are again demonstrated. ORBITS: The visualized portion of the orbits demonstrate no acute abnormality. SINUSES: The visualized paranasal sinuses and mastoid air cells demonstrate no acute abnormality. SOFT TISSUES/SKULL:  No acute abnormality of the visualized skull or soft tissues. Chronic findings in the brain without acute CT abnormality identified. XR CHEST PORTABLE    Result Date: 10/15/2023  EXAMINATION: ONE XRAY VIEW OF THE CHEST 10/15/2023 4:26 am COMPARISON: None. HISTORY: ORDERING SYSTEM PROVIDED HISTORY: altered mental status TECHNOLOGIST PROVIDED HISTORY: Reason for exam:->altered mental status Reason for Exam: ams FINDINGS: Shallow inflation. The cardiomediastinal silhouette appears mildly enlarged. Linear opacities in the lung bases are noted. No evidence for pneumothorax or edema. Blunting of the left costophrenic angle noted. No acute osseous abnormality is identified.      Shallow inflation with findings consistent with

## 2023-10-17 NOTE — PROGRESS NOTES
Son Stella Hewitt at bedside refuses patients 0400 VS check. Informed patient due to a call in regards to patient's change of status that a BP at minimal will need to be completed. Son agrees to a manual BP. Patient tolerated well. Son states patient has gotten 5 hours of sleep that has been undisturbed and would like to keep it that way. Educated son that the patient has labs that are ordered stat that will need to be completed and patient also will need to have her brief check and changed. Son verbalizes understanding. Patient is resting at this time. Will continue to monitor.      Electronically signed by Moo Yadav RN on 10/17/2023 at 4:06 AM

## 2023-10-17 NOTE — PLAN OF CARE
Problem: Confusion  Goal: Confusion, delirium, dementia, or psychosis is improved or at baseline  Description: INTERVENTIONS:  1. Assess for possible contributors to thought disturbance, including medications, impaired vision or hearing, underlying metabolic abnormalities, dehydration, psychiatric diagnoses, and notify attending LIP  2. [place] high risk fall precautions, as indicated  3. Provide frequent short contacts to provide reality reorientation, refocusing and direction  4. Decrease environmental stimuli, including noise as appropriate  5. Monitor and intervene to maintain adequate nutrition, hydration, elimination, sleep and activity  6. If unable to ensure safety without constant attention obtain sitter and review sitter guidelines with assigned personnel  7. Initiate Psychosocial CNS and Spiritual Care consult, as indicated  10/17/2023 0121 by Jo-Ann Sanchez RN  Outcome: Progressing  10/16/2023 1558 by Carley Bass RN  Outcome: Progressing     Problem: Skin/Tissue Integrity  Goal: Absence of new skin breakdown  Description: 1. Monitor for areas of redness and/or skin breakdown  2. Assess vascular access sites hourly  3. Every 4-6 hours minimum:  Change oxygen saturation probe site  4. Every 4-6 hours:  If on nasal continuous positive airway pressure, respiratory therapy assess nares and determine need for appliance change or resting period.   10/17/2023 0121 by Jo-Ann Sanchez RN  Outcome: Progressing  10/16/2023 1558 by Carley Bass RN  Outcome: Progressing

## 2023-10-17 NOTE — PLAN OF CARE
Problem: Confusion  Goal: Confusion, delirium, dementia, or psychosis is improved or at baseline  Description: INTERVENTIONS:  1. Assess for possible contributors to thought disturbance, including medications, impaired vision or hearing, underlying metabolic abnormalities, dehydration, psychiatric diagnoses, and notify attending LIP  2. [place] high risk fall precautions, as indicated  3. Provide frequent short contacts to provide reality reorientation, refocusing and direction  4. Decrease environmental stimuli, including noise as appropriate  5. Monitor and intervene to maintain adequate nutrition, hydration, elimination, sleep and activity  6. If unable to ensure safety without constant attention obtain sitter and review sitter guidelines with assigned personnel  7. Initiate Psychosocial CNS and Spiritual Care consult, as indicated  Outcome: Progressing     Problem: Skin/Tissue Integrity  Goal: Absence of new skin breakdown  Description: 1. Monitor for areas of redness and/or skin breakdown  2. Assess vascular access sites hourly  3. Every 4-6 hours minimum:  Change oxygen saturation probe site  4. Every 4-6 hours:  If on nasal continuous positive airway pressure, respiratory therapy assess nares and determine need for appliance change or resting period.   Outcome: Progressing

## 2023-10-17 NOTE — PROGRESS NOTES
Physical Therapy  Facility/Department: [address] MED SURG  Physical Therapy Daily Note  ([facility])  If patient discharges prior to next session this note will serve as a discharge summary. Please see below for the latest assessment towards goals. Name: Mikel Santoyo  : 1933  MRN: [id number]  Date of Service: 10/17/2023    Discharge Recommendations:  Patient would benefit from continued therapy after discharge (3-5)   Mikel Santoyo scored a  on the AM-PAC short mobility form. Current research shows that an AM-PAC score of 17 or less is typically not associated with a discharge to the patient's home setting. Based on the patient's AM-PAC score and their current functional mobility deficits, it is recommended that the patient have 3-5 sessions per week of Physical Therapy at d/c to increase the patient's independence. Please see assessment section for further patient specific details. PT Equipment Recommendations  Equipment Needed: No  Other: defer      Patient Diagnosis(es): The primary encounter diagnosis was Urinary tract infection without hematuria, site unspecified. Diagnoses of Altered mental status, unspecified altered mental status type, Frequent falls, and Goals of care, counseling/discussion were also pertinent to this visit. Past Medical History:  has a past medical history of Arthritis, Forgetfulness, Hx of blood clots, Hyperlipidemia, Hypertension, and Wears glasses. Past Surgical History:  has a past surgical history that includes Cholecystectomy; Hysterectomy, total abdominal; hernia repair; and Rectal prolapse repair (N/A, 2022). Assessment   Assessment: Today,the pt demonstrated that she is functioning well below her reported baseline and needed mod A of 2 for transfers sit <> stand and was able to walk with her rollator with mod A of 2 x 3 trials. The pt becomes easily anxious and fearful of falling and due to dementia does not follow directions well.  The pt is a fall risk and turned and sat on the rollator for a rest break then walked with a 2 wh walker 15 feet x 1 to the bathroom sink then ambulated with the rollator 20 feet back to the recliner; the pt very fearful; shuffles steps and needs assist for management of the walker  Distance: 25 feet, 15 feet, 20 feet  Comments: the pt stood to the rollator x 45 seconds for depends change  More Ambulation?: No  Stairs/Curb  Stairs?: No       Balance  Sitting - Static: Good (in the chair at the sink)  Sitting - Dynamic: Fair  Standing - Static: Fair;-  Standing - Dynamic: Fair;-  Comments: in standing for depends change the pt initially needed at least min A but progressed to mod A when the pt became fatigued and fearful and began to lean posteriorly             AM-PAC Score  AM-PAC Inpatient Mobility Raw Score : 11 (10/17/23 1448)  AM-PAC Inpatient T-Scale Score : 33.86 (10/17/23 1448)  Mobility Inpatient CMS 0-100% Score: 72.57 (10/17/23 1448)  Mobility Inpatient CMS G-Code Modifier : CL (10/17/23 1448)         Goals  Short Term Goals  Time Frame for Short Term Goals: upon d/c  (slow progression toward goals)  Short Term Goal 1: Bed mobility with min A of 1. Short Term Goal 2: Transfers sit <> stand with min A of 1. Short Term Goal 3: Ambulate with rollator 25 feet with min A of 1. Patient Goals   Patient Goals : per family: to go home       Education  Patient Education  Education Given To: Patient  Education Provided: Role of Therapy;Plan of Care;Orientation;Transfer Training;Equipment  Education Method: Verbal;Demonstration  Barriers to Learning: Cognition; Other (Comment) (anxiety)  Education Outcome: Unable to demonstrate understanding;Continued education needed      Therapy Time   Individual Concurrent Group Co-treatment   Time In 1416         Time Out 1505         Minutes 49               Electronically signed by Soren Paz PT [id number] on 10/17/2023 at 3:07 PM

## 2023-10-17 NOTE — PROGRESS NOTES
Occupational Therapy  Facility/Department: [facility]  Occupational Therapy Daily Treatment Note  Name: Kiley Herrmann  : 1933  MRN: [id number]  Date of Service: 10/17/2023    Discharge Recommendations:  Patient would benefit from continued therapy after discharge, 3-5 sessions per week, Other (comment)  OT Equipment Recommendations  Other: defer to d/c facility    Kiley Herrmann scored a 13/24 on the AM-PAC ADL Inpatient form. Current research shows that an AM-PAC score of 17 or less is typically not associated with a discharge to the patient's home setting. Based on the patient's AM-PAC score and their current ADL deficits, it is recommended that the patient have 3-5 sessions per week of Occupational Therapy at d/c to increase the patient's independence. Please see assessment section for further patient specific details. If patient discharges prior to next session this note will serve as a discharge summary. Please see below for the latest assessment towards goals. Patient Diagnosis(es): The primary encounter diagnosis was Urinary tract infection without hematuria, site unspecified. Diagnoses of Altered mental status, unspecified altered mental status type, Frequent falls, and Goals of care, counseling/discussion were also pertinent to this visit. Past Medical History:  has a past medical history of Arthritis, Forgetfulness, Hx of blood clots, Hyperlipidemia, Hypertension, and Wears glasses. Past Surgical History:  has a past surgical history that includes Cholecystectomy; Hysterectomy, total abdominal; hernia repair; and Rectal prolapse repair (N/A, 2022). Treatment Diagnosis: decreased fxl transfers/mobility and ADL status      Assessment   Performance deficits / Impairments: Decreased functional mobility ; Decreased balance;Decreased coordination;Decreased ADL status; Decreased cognition;Decreased ROM; Decreased endurance;Decreased high-level IADLs;Decreased Modifier : CL (10/17/23 1505)           Goals  Short Term Goals  Time Frame for Short Term Goals: prior to d/c: status goals 10/17: all goals ongoing  Short Term Goal 1: pt will complete toileting w/ Min A  Short Term Goal 2: pt will complete UB/LB dressing w/ Min A  Short Term Goal 3: pt will complete bathing w/ Mod A  Long Term Goals  Time Frame for Long Term Goals : STG=LTG  Patient Goals   Patient goals : pt did not report goals       Therapy Time   Individual Concurrent Group Co-treatment   Time In 1416         Time Out 1505         Minutes [id number] [facility] Jose, OTR/L 7482

## 2023-10-17 NOTE — PROGRESS NOTES
Pt unable to verbalize clearly wants and needs. Call light within reach. Gaston Angry out when touched for care. Son at bedside and calms pt to allow some services to be given. Pt confused. Alert and oriented to self and familiar faces/voices. Attempted 0000 VS and pt wouldn't allow this writer to proceed. Son at bedside and aware. Skin tear to LFA new dressing applied. Pt tolerated all care well.    Electronically signed by Reyna Ferris RN on 10/17/2023 at 3:01 AM

## 2023-10-17 NOTE — PROGRESS NOTES
Pt continually hallucinating and screaming out \"Stop it\", \"Here it comes\" and \"Give me the puzzle piece\"  Tried to redirect, it lasts about one minute and she starts screaming again. Sitter remains at bedside. Family wishes to not medicate for agitation.

## 2023-10-17 NOTE — CONSULTS
[facility]  Palliative Care   Consult Note    [facility]  MEDICAL RECORD NUMBER:  [id number]  AGE: 80 y.o. GENDER: female  : 1933  TODAY'S DATE:  10/17/2023    Subjective     Reason for Consult:  goals of care  Visit Type: Initial Consult      Stephanie Colin is a 80 y.o. female referred by:   [x] Physician  [] Nursing  [] Family Request   [] Other:     PAST MEDICAL HISTORY      Diagnosis Date    Arthritis     bilateral knee    Forgetfulness     Hx of blood clots     DVT    Hyperlipidemia     Hypertension     Wears glasses     reading       PAST SURGICAL HISTORY  Past Surgical History:   Procedure Laterality Date    CHOLECYSTECTOMY      HERNIA REPAIR      hiatal hernia    HYSTERECTOMY, TOTAL ABDOMINAL (CERVIX REMOVED)      complete    RECTAL PROLAPSE REPAIR N/A 2022    TRANSPERINEAL RECTOSIGMOIDECTOMY (ALTEMEIER PROCEDURE), FLEXIBLE SIGMOIDOSCOPY performed by Ary Harris MD at [address] [place] [address] History   Problem Relation Age of Onset    Dementia Mother     Heart Disease Father     Depression Father        SOCIAL HISTORY  Social History     Tobacco Use    Smoking status: Never    Smokeless tobacco: Never   Substance Use Topics    Alcohol use: Yes     Comment: socially--wine    Drug use: Never       ALLERGIES  No Known Allergies    MEDICATIONS  No current facility-administered medications on file prior to encounter.      Current Outpatient Medications on File Prior to Encounter   Medication Sig Dispense Refill    docusate sodium (COLACE) 100 MG capsule Take 1 capsule by mouth 2 times daily as needed for Constipation (take while on narcotic pain medication) 60 capsule 0    calcium carbonate (OSCAL) 500 MG TABS tablet Take 400 mg by mouth 2 times daily      atorvastatin (LIPITOR) 20 MG tablet Take 20 mg by mouth at bedtime       triamterene-hydroCHLOROthiazide (MAXZIDE-25) 37.5-25 MG per tablet Take 1 tablet by mouth daily       amLODIPine (NORVASC) 5

## 2023-10-17 NOTE — PROGRESS NOTES
[facility] called to notify that patient is going in and out of 2nd degree heart block Mobitz type two. Last run was for 10 beats. Fyi sent to on call NP. Patient is sleeping currently.      Electronically signed by Juve Fuentes RN on 10/17/2023 at 3:43 AM'

## 2023-10-18 PROCEDURE — 2580000003 HC RX 258: Performed by: INTERNAL MEDICINE

## 2023-10-18 PROCEDURE — 1200000000 HC SEMI PRIVATE

## 2023-10-18 PROCEDURE — 6370000000 HC RX 637 (ALT 250 FOR IP): Performed by: INTERNAL MEDICINE

## 2023-10-18 PROCEDURE — 94760 N-INVAS EAR/PLS OXIMETRY 1: CPT

## 2023-10-18 PROCEDURE — 6360000002 HC RX W HCPCS: Performed by: INTERNAL MEDICINE

## 2023-10-18 RX ADMIN — MICONAZOLE NITRATE: 2 POWDER TOPICAL at 09:02

## 2023-10-18 RX ADMIN — SODIUM CHLORIDE, PRESERVATIVE FREE 10 ML: 5 INJECTION INTRAVENOUS at 09:02

## 2023-10-18 RX ADMIN — ENOXAPARIN SODIUM 40 MG: 100 INJECTION SUBCUTANEOUS at 09:01

## 2023-10-18 RX ADMIN — AMLODIPINE BESYLATE 5 MG: 5 TABLET ORAL at 21:03

## 2023-10-18 RX ADMIN — SODIUM CHLORIDE, PRESERVATIVE FREE 10 ML: 5 INJECTION INTRAVENOUS at 21:08

## 2023-10-18 RX ADMIN — ATORVASTATIN CALCIUM 20 MG: 20 TABLET, FILM COATED ORAL at 21:03

## 2023-10-18 RX ADMIN — CEFTRIAXONE 1000 MG: 1 INJECTION, POWDER, FOR SOLUTION INTRAMUSCULAR; INTRAVENOUS at 12:33

## 2023-10-18 RX ADMIN — MICONAZOLE NITRATE: 2 POWDER TOPICAL at 21:02

## 2023-10-18 NOTE — DISCHARGE INSTR - DIET

## 2023-10-18 NOTE — PROGRESS NOTES
Patient attempts to get out of bed. Patient's bed alarm was activated. Patient has a tele cam that called PCA. PCA calls nurse. Redirected and repositioned patient in bed. Patient is dry at this time. Bed is locked, in lowest position, and armed. Will continue to monitor.      Electronically signed by Luh Loja RN on 10/18/2023 at 12:59 AM

## 2023-10-18 NOTE — PROGRESS NOTES
Patient attempts to get out of bed. Patient's bed alarm was activated. Tele cam was alerted. Nurse is bedside with patient. Redirected and repositioned patient in bed. Patient is wet at this time. Incontinence care is provided, and zinc oxide cream is applied to redness to bottom. Bed is locked, in lowest position, and armed. Nurse calls son Sameermarcos Hobbs to inform of update. Thoedora Hobbs as nurse to call back in 45 min to see if she has calmed down. If patient has not then states he will come to spend night with his mother. Will continue to monitor.      Electronically signed by Yani Keller RN on 10/18/2023 at 1:02 AM

## 2023-10-18 NOTE — PROGRESS NOTES
Gypsy Rucks is present at this time. Patient is more alert at this time. Per family request, they would like door closed and verbalizes they will use call light if any needs arise. Family would like for patient to attempt to get some sleep.       Electronically signed by Hillary Anderson RN on 10/18/2023 at 1:07 AM

## 2023-10-18 NOTE — DISCHARGE SUMMARY
Hospital Medicine Discharge Summary/progress note     Patient ID: Eligio Verma      Patient's PCP: Norma Little DO    Admit Date: 10/15/2023     Discharge Date:   10/18/2023    Admitting Provider: Zonia Webster MD     Discharge Provider: Ron Edwards MD     Discharge Diagnoses: Active Hospital Problems    Diagnosis     Acute metabolic encephalopathy [Y08.13]        The patient was seen and examined on day of discharge and this discharge summary is in conjunction with any daily progress note from day of discharge. Hospital Course:       From HPI:\"Molly Maldonado is a 80 y.o. female with pmh of dementia/memory issues, hypertension, hyperlipidemia who presents with worsening confusion and falls. Over the last few months patient has been having falls. Last fall was about a week ago when patient was sent to [facility] and discharged home. Patient has been having worsening confusion and agitation. With hallucination family is concerned that the patient cannot manage at home with her elderly . At baseline patient does have memory issues, wanders around the house at night but is usually not very agitated. Patient does complain of dysuria but denies any other symptoms. \"    Acute metabolic encephalopathy on top of cognitive impairment and memory issues, fluctuating confusion but overall significantly better, family at bedside, plan to discharge to SNF discussed with her son  UTI patient on ceftriaxone, this is associated with leukocytosis, received IV ceftriaxone as above  Minimally elevated troponin and left bundle branch block with suspected episodes of AV block during the night, which appears chronic troponin flat. Completely asymptomatic.   Denies any chest pain follow-up with PCP and cardiology as outpatient if deemed necessary  Essential hypertension p.o. medication  Hyperlipidemia on statin to be continued      Addendum  Patient was not discharged on October 18, 2023 pending

## 2023-10-18 NOTE — PLAN OF CARE
Problem: Confusion  Goal: Confusion, delirium, dementia, or psychosis is improved or at baseline  Description: INTERVENTIONS:  1. Assess for possible contributors to thought disturbance, including medications, impaired vision or hearing, underlying metabolic abnormalities, dehydration, psychiatric diagnoses, and notify attending LIP  2. [place] high risk fall precautions, as indicated  3. Provide frequent short contacts to provide reality reorientation, refocusing and direction  4. Decrease environmental stimuli, including noise as appropriate  5. Monitor and intervene to maintain adequate nutrition, hydration, elimination, sleep and activity  6. If unable to ensure safety without constant attention obtain sitter and review sitter guidelines with assigned personnel  7. Initiate Psychosocial CNS and Spiritual Care consult, as indicated  10/18/2023 1048 by Charmayne Fat, RN  Outcome: [address] [address] Not Progressing  10/18/2023 0056 by Tiffany Venegas RN  Outcome: Progressing  10/18/2023 0056 by Tiffany Venegas RN  Outcome: Progressing  10/18/2023 0055 by Tiffany Venegas RN  Outcome: Progressing     Problem: Skin/Tissue Integrity  Goal: Absence of new skin breakdown  Description: 1. Monitor for areas of redness and/or skin breakdown  2. Assess vascular access sites hourly  3. Every 4-6 hours minimum:  Change oxygen saturation probe site  4. Every 4-6 hours:  If on nasal continuous positive airway pressure, respiratory therapy assess nares and determine need for appliance change or resting period.   10/18/2023 1048 by Charmayne Fat, RN  Outcome: [address] [facility] [address] Not Progressing  10/18/2023 0056 by Tiffany Venegas RN  Outcome: Progressing  10/18/2023 0056 by Tiffany Venegas RN  Outcome: Progressing  10/18/2023 0055 by Tiffany Venegas RN  Outcome: Progressing     Problem: Neurosensory - Adult  Goal: Achieves stable or improved neurological status  10/18/2023 1048 by Charmayne Fat, RN  Outcome: [address] [address] Not

## 2023-10-18 NOTE — PROGRESS NOTES
Nurse calls son carmen after patient makes another attempt to get out of bed. Patient is anxious and agitated. Per family request no pharmaceutical interventions are done at this time. Patient has been redirected, and attempted to be reoriented at this time. Tele cam is still present. Patient is safely in bed that is locked, in lowest position, and armed. Nurse sits at bedside with patient providing water and snack at this time. Laine Perera is on his way. Will continue to monitor.       Electronically signed by Deann Freed RN on 10/18/2023 at 1:05 AM

## 2023-10-18 NOTE — PLAN OF CARE
Problem: Confusion  Goal: Confusion, delirium, dementia, or psychosis is improved or at baseline  Description: INTERVENTIONS:  1. Assess for possible contributors to thought disturbance, including medications, impaired vision or hearing, underlying metabolic abnormalities, dehydration, psychiatric diagnoses, and notify attending LIP  2. [place] high risk fall precautions, as indicated  3. Provide frequent short contacts to provide reality reorientation, refocusing and direction  4. Decrease environmental stimuli, including noise as appropriate  5. Monitor and intervene to maintain adequate nutrition, hydration, elimination, sleep and activity  6. If unable to ensure safety without constant attention obtain sitter and review sitter guidelines with assigned personnel  7. Initiate Psychosocial CNS and Spiritual Care consult, as indicated  10/18/2023 0056 by Pola Eng RN  Outcome: Progressing  10/18/2023 0056 by Pola Eng RN  Outcome: Progressing  10/18/2023 0055 by Pola Eng RN  Outcome: Progressing  10/17/2023 1615 by Greta Dobson RN  Outcome: Progressing     Problem: Skin/Tissue Integrity  Goal: Absence of new skin breakdown  Description: 1. Monitor for areas of redness and/or skin breakdown  2. Assess vascular access sites hourly  3. Every 4-6 hours minimum:  Change oxygen saturation probe site  4. Every 4-6 hours:  If on nasal continuous positive airway pressure, respiratory therapy assess nares and determine need for appliance change or resting period.   10/18/2023 0056 by Pola Eng RN  Outcome: Progressing  10/18/2023 0056 by Pola Eng RN  Outcome: Progressing  10/18/2023 0055 by Pola Eng RN  Outcome: Progressing  10/17/2023 1615 by Greta Dobson RN  Outcome: Progressing     Problem: Neurosensory - Adult  Goal: Achieves stable or improved neurological status  10/18/2023 0056 by Pola Eng RN  Outcome: Progressing  10/18/2023 0056 by

## 2023-10-19 PROCEDURE — 94760 N-INVAS EAR/PLS OXIMETRY 1: CPT

## 2023-10-19 PROCEDURE — 97530 THERAPEUTIC ACTIVITIES: CPT

## 2023-10-19 PROCEDURE — 6370000000 HC RX 637 (ALT 250 FOR IP): Performed by: INTERNAL MEDICINE

## 2023-10-19 PROCEDURE — 97535 SELF CARE MNGMENT TRAINING: CPT

## 2023-10-19 PROCEDURE — 1200000000 HC SEMI PRIVATE

## 2023-10-19 PROCEDURE — 2580000003 HC RX 258: Performed by: INTERNAL MEDICINE

## 2023-10-19 PROCEDURE — 6360000002 HC RX W HCPCS: Performed by: INTERNAL MEDICINE

## 2023-10-19 RX ORDER — TRIAMTERENE AND HYDROCHLOROTHIAZIDE 37.5; 25 MG/1; MG/1
1 TABLET ORAL DAILY
Status: DISCONTINUED | OUTPATIENT
Start: 2023-10-19 | End: 2023-10-20 | Stop reason: HOSPADM

## 2023-10-19 RX ADMIN — MICONAZOLE NITRATE: 2 POWDER TOPICAL at 09:26

## 2023-10-19 RX ADMIN — MICONAZOLE NITRATE: 2 POWDER TOPICAL at 21:03

## 2023-10-19 RX ADMIN — SODIUM CHLORIDE, PRESERVATIVE FREE 10 ML: 5 INJECTION INTRAVENOUS at 21:03

## 2023-10-19 RX ADMIN — TRIAMTERENE AND HYDROCHLOROTHIAZIDE 1 TABLET: 25; 37.5 TABLET ORAL at 14:20

## 2023-10-19 RX ADMIN — ATORVASTATIN CALCIUM 20 MG: 20 TABLET, FILM COATED ORAL at 21:03

## 2023-10-19 RX ADMIN — ENOXAPARIN SODIUM 40 MG: 100 INJECTION SUBCUTANEOUS at 09:26

## 2023-10-19 RX ADMIN — CEFTRIAXONE 1000 MG: 1 INJECTION, POWDER, FOR SOLUTION INTRAMUSCULAR; INTRAVENOUS at 12:05

## 2023-10-19 RX ADMIN — AMLODIPINE BESYLATE 5 MG: 5 TABLET ORAL at 21:03

## 2023-10-19 NOTE — PROGRESS NOTES
Occupational Therapy  Facility/Department: [facility]  Occupational Therapy Daily Treatment Note    Name: Jenna Harris  : 1933  MRN: [id number]  Date of Service: 10/19/2023    Discharge Recommendations:  Patient would benefit from continued therapy after discharge, 3-5 sessions per week  OT Equipment Recommendations  Other: defer to d/c facility    Jenna Harris scored a 13/24 on the AM-PAC ADL Inpatient form. Current research shows that an AM-PAC score of 17 or less is typically not associated with a discharge to the patient's home setting. Based on the patient's AM-PAC score and their current ADL deficits, it is recommended that the patient have 3-5 sessions per week of Occupational Therapy at d/c to increase the patient's independence. Please see assessment section for further patient specific details. If patient discharges prior to next session this note will serve as a discharge summary. Please see below for the latest assessment towards goals. Patient Diagnosis(es): The primary encounter diagnosis was Urinary tract infection without hematuria, site unspecified. Diagnoses of Altered mental status, unspecified altered mental status type, Frequent falls, and Goals of care, counseling/discussion were also pertinent to this visit. Past Medical History:  has a past medical history of Arthritis, Forgetfulness, Hx of blood clots, Hyperlipidemia, Hypertension, and Wears glasses. Past Surgical History:  has a past surgical history that includes Cholecystectomy; Hysterectomy, total abdominal; hernia repair; and Rectal prolapse repair (N/A, 2022). Treatment Diagnosis: decreased fxl transfers/mobility and ADL status      Assessment   Performance deficits / Impairments: Decreased functional mobility ; Decreased balance;Decreased coordination;Decreased ADL status; Decreased cognition;Decreased ROM; Decreased endurance;Decreased high-level IADLs;Decreased strength  Assessment: Jaqueline Curry required to generate solutions  Insights: Decreased awareness of deficits  Initiation: Requires cues for some  Sequencing: Requires cues for some  Cognition Comment: pt very anxious and fearful of falling    Functional Mobility: unable to ambulate this date due to fear of falling, anxiety and heavy posterior lean in standing  Static Sitting Balance: seated EOB with mod A d/t posterior lean and pt resisting d/t fear of falling progressing to CGA briefly with UE support on bedrails  Static Standing Balance : mod A x2 for brief standing balance at rollator x2 trials, unsteady with posterior lean and fear of falling. Education Given To: Patient; Family  Education Provided: Role of Therapy; ADL Adaptive Strategies; Fall Prevention Strategies; Plan of Care;Transfer Training  Education Method: Demonstration;Verbal  Barriers to Learning: Cognition  Education Outcome: Verbalized understanding;Continued education needed                                   AM-PAC Score        AM-PAC Inpatient Daily Activity Raw Score: 13 (10/17/23 1505)  AM-PAC Inpatient ADL T-Scale Score : 32.03 (10/17/23 1505)  ADL Inpatient CMS 0-100% Score: 63.03 (10/17/23 1505)  ADL Inpatient CMS G-Code Modifier : CL (10/17/23 1505)        Goals  Short Term Goals  Time Frame for Short Term Goals: prior to d/c: status goals 10/19: all goals ongoing  Short Term Goal 1: pt will complete toileting w/ Min A  Short Term Goal 2: pt will complete UB/LB dressing w/ Min A  Short Term Goal 3: pt will complete bathing w/ Mod A  Long Term Goals  Time Frame for Long Term Goals : STG=LTG  Patient Goals   Patient goals : pt did not report goals       Therapy Time   Individual Concurrent Group Co-treatment   Time In 1127         Time Out 1206         Minutes [address] [facility], OTR/L 1233

## 2023-10-19 NOTE — PLAN OF CARE
Problem: Confusion  Goal: Confusion, delirium, dementia, or psychosis is improved or at baseline  Description: INTERVENTIONS:  1. Assess for possible contributors to thought disturbance, including medications, impaired vision or hearing, underlying metabolic abnormalities, dehydration, psychiatric diagnoses, and notify attending LIP  2. [place] high risk fall precautions, as indicated  3. Provide frequent short contacts to provide reality reorientation, refocusing and direction  4. Decrease environmental stimuli, including noise as appropriate  5. Monitor and intervene to maintain adequate nutrition, hydration, elimination, sleep and activity  6. If unable to ensure safety without constant attention obtain sitter and review sitter guidelines with assigned personnel  7. Initiate Psychosocial CNS and Spiritual Care consult, as indicated  Outcome: Progressing     Problem: Skin/Tissue Integrity  Goal: Absence of new skin breakdown  Description: 1. Monitor for areas of redness and/or skin breakdown  2. Assess vascular access sites hourly  3. Every 4-6 hours minimum:  Change oxygen saturation probe site  4. Every 4-6 hours:  If on nasal continuous positive airway pressure, respiratory therapy assess nares and determine need for appliance change or resting period.   Outcome: Progressing     Problem: Neurosensory - Adult  Goal: Achieves stable or improved neurological status  Outcome: Progressing  Goal: Achieves maximal functionality and self care  Outcome: Progressing     Problem: Skin/Tissue Integrity - Adult  Goal: Skin integrity remains intact  Outcome: Progressing  Goal: Incisions, wounds, or drain sites healing without S/S of infection  Outcome: Progressing  Goal: Oral mucous membranes remain intact  Outcome: Progressing     Problem: Musculoskeletal - Adult  Goal: Return mobility to safest level of function  Outcome: Progressing  Goal: Maintain proper alignment of affected body part  Outcome: Progressing  Goal: Return ADL status to a safe level of function  Outcome: Progressing     Problem: Infection - Adult  Goal: Absence of infection at discharge  Outcome: Progressing  Goal: Absence of infection during hospitalization  Outcome: Progressing

## 2023-10-19 NOTE — PROGRESS NOTES
Pt alert to self and familiar faces. Pt continues with anxiety during care. Verbally voices concerns of falling during repositioning and incontinence care. Yells out. Denied any pain or discomfort this shift. Tele camera remains in room. Son Cresencio Benitezs at bedside. Mood pleasant. Call light within reach.

## 2023-10-19 NOTE — PROGRESS NOTES
Automated exposure control, iterative reconstruction, and/or weight based adjustment of the mA/kV was utilized to reduce the radiation dose to as low as reasonably achievable. COMPARISON: None. HISTORY: ORDERING SYSTEM PROVIDED HISTORY: altered mental status TECHNOLOGIST PROVIDED HISTORY: Reason for exam:->altered mental status Has a \"code stroke\" or \"stroke alert\" been called? ->No Decision Support Exception - unselect if not a suspected or confirmed emergency medical condition->Emergency Medical Condition (MA) Reason for Exam: ams FINDINGS: BRAIN/VENTRICLES: There is no acute intracranial hemorrhage, mass effect or midline shift. No abnormal extra-axial fluid collection. Cortical atrophy and chronic white matter changes in the brain and associated ventricular enlargement are again demonstrated. ORBITS: The visualized portion of the orbits demonstrate no acute abnormality. SINUSES: The visualized paranasal sinuses and mastoid air cells demonstrate no acute abnormality. SOFT TISSUES/SKULL:  No acute abnormality of the visualized skull or soft tissues. Chronic findings in the brain without acute CT abnormality identified. XR CHEST PORTABLE    Result Date: 10/15/2023  EXAMINATION: ONE XRAY VIEW OF THE CHEST 10/15/2023 4:26 am COMPARISON: None. HISTORY: ORDERING SYSTEM PROVIDED HISTORY: altered mental status TECHNOLOGIST PROVIDED HISTORY: Reason for exam:->altered mental status Reason for Exam: ams FINDINGS: Shallow inflation. The cardiomediastinal silhouette appears mildly enlarged. Linear opacities in the lung bases are noted. No evidence for pneumothorax or edema. Blunting of the left costophrenic angle noted. No acute osseous abnormality is identified. Shallow inflation with findings consistent with subsegmental atelectasis. Question small left effusion versus pleural thickening. CBC: No results for input(s): \"WBC\", \"HGB\", \"PLT\" in the last 72 hours.   BMP:  No results for input(s): \"NA\", \"K\", \"CL\", \"CO2\", \"BUN\", \"CREATININE\", \"GLUCOSE\" in the last 72 hours. Hepatic: No results for input(s): \"AST\", \"ALT\", \"ALB\", \"BILITOT\", \"ALKPHOS\" in the last 72 hours. Lipids: No results found for: \"CHOL\", \"HDL\", \"TRIG\"  Hemoglobin A1C: No results found for: \"LABA1C\"  TSH: No results found for: \"TSH\"  Troponin: No results found for: \"TROPONINT\"  Lactic Acid: No results for input(s): \"LACTA\" in the last 72 hours. BNP: No results for input(s): \"PROBNP\" in the last 72 hours. UA:  Lab Results   Component Value Date/Time    NITRU Negative 10/15/2023 06:43 AM    COLORU Yellow 10/15/2023 06:43 AM    PHUR 6.5 10/15/2023 06:43 AM    WBCUA 20 10/15/2023 06:43 AM    RBCUA 2 10/15/2023 06:43 AM    BACTERIA 4+ 10/15/2023 06:43 AM    CLARITYU CLOUDY 10/15/2023 06:43 AM    SPECGRAV 1.015 10/15/2023 06:43 AM    LEUKOCYTESUR LARGE 10/15/2023 06:43 AM    UROBILINOGEN 0.2 10/15/2023 06:43 AM    BILIRUBINUR Negative 10/15/2023 06:43 AM    BLOODU SMALL 10/15/2023 06:43 AM    GLUCOSEU Negative 10/15/2023 06:43 AM    KETUA Negative 10/15/2023 06:43 AM     Urine Cultures:   Lab Results   Component Value Date/Time    LABURIN  10/15/2023 06:43 AM     >50,000 CFU/ml mixed skin/urogenital jay. No further workup     Blood Cultures: No results found for: \"BC\"  No results found for: \"BLOODCULT2\"  Organism: No results found for: \"ORG\"      Electronically signed by Valentin Hunt MD on 10/20/2023 at 9:11 AM  Comment: Please note this report has been produced using speech recognition software and may contain errors related to that system including errors in grammar, punctuation, and spelling, as well as words and phrases that may be inappropriate. If there are any questions or concerns, please feel free to contact the dictating provider for clarification.

## 2023-10-20 VITALS
OXYGEN SATURATION: 93 % | SYSTOLIC BLOOD PRESSURE: 126 MMHG | HEIGHT: 62 IN | BODY MASS INDEX: 24.5 KG/M2 | WEIGHT: 133.16 LBS | HEART RATE: 74 BPM | RESPIRATION RATE: 18 BRPM | DIASTOLIC BLOOD PRESSURE: 61 MMHG | TEMPERATURE: 98.4 F

## 2023-10-20 PROCEDURE — 6360000002 HC RX W HCPCS: Performed by: INTERNAL MEDICINE

## 2023-10-20 PROCEDURE — 94760 N-INVAS EAR/PLS OXIMETRY 1: CPT

## 2023-10-20 PROCEDURE — 97530 THERAPEUTIC ACTIVITIES: CPT

## 2023-10-20 PROCEDURE — 97116 GAIT TRAINING THERAPY: CPT

## 2023-10-20 PROCEDURE — 2580000003 HC RX 258: Performed by: INTERNAL MEDICINE

## 2023-10-20 PROCEDURE — 6370000000 HC RX 637 (ALT 250 FOR IP): Performed by: INTERNAL MEDICINE

## 2023-10-20 RX ADMIN — TRIAMTERENE AND HYDROCHLOROTHIAZIDE 1 TABLET: 25; 37.5 TABLET ORAL at 09:36

## 2023-10-20 RX ADMIN — MICONAZOLE NITRATE: 2 POWDER TOPICAL at 09:36

## 2023-10-20 RX ADMIN — ENOXAPARIN SODIUM 40 MG: 100 INJECTION SUBCUTANEOUS at 09:36

## 2023-10-20 RX ADMIN — SODIUM CHLORIDE, PRESERVATIVE FREE 10 ML: 5 INJECTION INTRAVENOUS at 09:39

## 2023-10-20 NOTE — CARE COORDINATION
10/17 Call placed to [facility] regarding status of referral. Message left for return call. Electronically signed by Michael Blake RN on 10/17/2023 at 4:31 PM
10/18 [facility] website checked for status of pre cert/remains in \"pending\" status.  Electronically signed by Prince Brian RN on 10/18/2023 at 4:28 PM'
10/19 Call received from [facility] regarding offer for MD to do a Peer to Peer regarding precert for SNF by  at 10:30 am EST by calling [phone], Option 5. Will need Name, , and Insurance ID [id number]. Updated PT and OT notes uploaded to [facility] website.  Electronically signed by Hemanth Perla RN on 10/19/2023 at 3:36 PM
10/19 [facility] website checked for status of precert request. The request remains in Pending status with a note stating, \"Member with inconsistent ability to follow commands and may not meet criteria for level of care requested. \" Call placed to Jeanette Dunn to notify of current status of pre cert. CM will await final decision from insurance company. The family is agreeable to pay privately for the facility if necessary. Electronically signed by Jeimy Ingram RN on 10/19/2023 at 8:37 AM
10/20 Call received from [facility] regarding pre cert denied by insurance company. Son Seferino Falcon notified of outcome of precert. The family has arranged to take the patient to [facility] private pay. Her sons will be transporting her to the facility. Electronically signed by Tawana Hernandez RN on 10/20/2023 at 1:17 PM
DISCHARGE SUMMARY     DATE OF DISCHARGE: 10/20/23    DISCHARGE DESTINATION: WHRV-Private Pay    HEMODIALYSIS: No    TRANSPORTATION: Private Car    NEW DME ORDERED: no    COMMENTS: Family will transport patient to [facility]. PASRR completed.  Electronically signed by George Amaya RN on 10/20/2023 at 1:24 PM
[facility] Nurse  Consult Note       [facility]  MEDICAL RECORD NUMBER:  [id number]  AGE: 80 y.o. GENDER: female  : 1933  TODAY'S DATE:  10/16/2023    Subjective   Reason for WOCN Evaluation and Assessment: Redness under breasts      Tomasa Grover is a 80 y.o. female referred by:   [] Physician  [x] Nursing  [] Other:     Wound Identification:  Wound Type:  Moisture, skin tear  Contributing Factors: poor hygiene, fall    Wound History: Reported that patient wore a bra for a year and refused to remove. Skin tear from a fall. Current Wound Care Treatment:  n/a    Patient Goal of Care:  [] Wound Healing  [] Odor Control  [] Palliative Care  [] Pain Control   [] Other:         PAST MEDICAL HISTORY        Diagnosis Date    Arthritis     bilateral knee    Forgetfulness     Hx of blood clots     DVT    Hyperlipidemia     Hypertension     Wears glasses     reading       PAST SURGICAL HISTORY    Past Surgical History:   Procedure Laterality Date    CHOLECYSTECTOMY      HERNIA REPAIR      hiatal hernia    HYSTERECTOMY, TOTAL ABDOMINAL (CERVIX REMOVED)      complete    RECTAL PROLAPSE REPAIR N/A 2022    TRANSPERINEAL RECTOSIGMOIDECTOMY (ALTEMEIER PROCEDURE), FLEXIBLE SIGMOIDOSCOPY performed by Shankar Salguero MD at [address] [place] [address] History   Problem Relation Age of Onset    Dementia Mother     Heart Disease Father     Depression Father        SOCIAL HISTORY    Social History     Tobacco Use    Smoking status: Never    Smokeless tobacco: Never   Substance Use Topics    Alcohol use: Yes     Comment: socially--wine    Drug use: Never       ALLERGIES    No Known Allergies    MEDICATIONS    No current facility-administered medications on file prior to encounter.      Current Outpatient Medications on File Prior to Encounter   Medication Sig Dispense Refill    docusate sodium (COLACE) 100 MG capsule Take 1 capsule by mouth 2 times daily as needed for
REQUEST FOR SKILLED FACILITY AUTHORIZATION SUBMITTED TO [employer]:    Patient name:  Corin Miranda    Current Location: [facility] \[facility]\"" AT [facility] Inpatient    Admit date to hospital: 10/15/2023    Requested Setting:  SNF-[facility]    Anticipated Admit Date to SNF Level of Care:  10/19/23    ORDERING MD: Danilo Masters MD  Electronically signed by Isabela Villalpando RN on 10/18/2023 at 2:53 PM
functional level: (P) Assistance with the following:, Bathing, Shopping, Housework, Cooking, Toileting    PT AM-PAC: 11 /24  OT AM-PAC: 13 /24    Family can provide assistance at DC: (P) Yes  Would you like Case Management to discuss the discharge plan with any other family members/significant others, and if so, who? (P) Yes ( and sons)  Plans to Return to Present Housing: (P) Yes  Other Identified Issues/Barriers to RETURNING to current housing: Lives with  and children assist  Potential Assistance needed at discharge: (P) [address] [place] [address]            Potential DME:  TBD  Patient expects to discharge to: (P) House (2 steps to enter)  Plan for transportation at discharge: (P) Family    Financial    Payor: [facility] MEDICARE / Plan: Myranda Nelson / Product Type: *No Product type* /     Does insurance require precert for SNF: Yes    Potential assistance Purchasing Medications: (P) No  Meds-to-Beds request: [facility] [id number] Triny Flower, [address] [phone]  [address]  Phone: [phone] Fax: [phone]      Notes:    Factors facilitating achievement of predicted outcomes: Family support, Cooperative, Pleasant, and Has needed Durable Medical Equipment at home    Barriers to discharge: Confusion, Cognitive deficit, Decreased endurance, Upper extremity weakness, Lower extremity weakness, Incontinence of bowel, Incontinence of bladder, Stairs at home, and Skin Care    Additional Case Management Notes: Patient lives with her  and her children assist with care. She has dementia and is pleasantly confused. CM recommends SNF or [address] [facility] at discharge.     The Plan for Transition of Care is related to the following treatment goals of Goals of care, counseling/discussion [Z71.89]  Frequent falls [R29.6]  Urinary tract infection without hematuria, site unspecified [N39.0]  Altered mental status, unspecified altered mental
Potential Assistance Needed Home Care   DME Ordered? Other (comment)  (Follow for needs)   Potential Assistance Purchasing Medications No   Type of [address]   Patient expects to be discharged to: [place]  (2 steps to enter)   Follow Up Appointment: Best Day/Time  Monday AM   One/Two Story Residence Two story   # of Interior Steps Isma Available Yes   History of falls? 1   Services At/After Discharge   Transition of Care Consult (CM Consult) [address] Provided?  No   Mode of Transport at Discharge Other (see comment)  (family)   Confirm Follow Up Transport Family     Electronically signed by Fer Reaves RN on 10/16/2023 at 3:25 PM

## 2023-10-20 NOTE — DISCHARGE SUMMARY
Hospital Medicine Discharge Summary    Patient ID: Ileana Mobley      Patient's PCP: Reymundo Cruz DO    Admit Date: 10/15/2023     Discharge Date:   10/20/2023    Admitting Provider: Radhika Alarcon MD     Discharge Provider: Anna Riddle MD     Discharge Diagnoses: Active Hospital Problems    Diagnosis     Acute metabolic encephalopathy [E39.99]        The patient was seen and examined on day of discharge and this discharge summary is in conjunction with any daily progress note from day of discharge. Hospital Course:     From HPI:\"Molly Almanza is a 80 y.o. female with pmh of dementia/memory issues, hypertension, hyperlipidemia who presents with worsening confusion and falls. Over the last few months patient has been having falls. Last fall was about a week ago when patient was sent to [facility] [place] and discharged home. Patient has been having worsening confusion and agitation. With hallucination family is concerned that the patient cannot manage at home with her elderly . At baseline patient does have memory issues, wanders around the house at night but is usually not very agitated. Patient does complain of dysuria but denies any other symptoms. \"      Acute metabolic encephalopathy on top of cognitive impairment and memory issues, fluctuating confusion but overall significantly better, family at bedside, plan was to discharge to SNF, I did peer to peer declined by insurance, family planning to discharge patient to 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 Completely asymptomatic.   Denies any chest pain follow-up with PCP and cardiology as outpatient if deemed necessary  Essential hypertension p.o. medication  Hyperlipidemia on statin to be continued      Physical Exam Performed:

## 2023-10-20 NOTE — PLAN OF CARE
Problem: Confusion  Goal: Confusion, delirium, dementia, or psychosis is improved or at baseline  Description: INTERVENTIONS:  1. Assess for possible contributors to thought disturbance, including medications, impaired vision or hearing, underlying metabolic abnormalities, dehydration, psychiatric diagnoses, and notify attending LIP  2. Shubert high risk fall precautions, as indicated  3. Provide frequent short contacts to provide reality reorientation, refocusing and direction  4. Decrease environmental stimuli, including noise as appropriate  5. Monitor and intervene to maintain adequate nutrition, hydration, elimination, sleep and activity  6. If unable to ensure safety without constant attention obtain sitter and review sitter guidelines with assigned personnel  7. Initiate Psychosocial CNS and Spiritual Care consult, as indicated  10/20/2023 1045 by Armin Cortez RN  Outcome: Progressing  10/20/2023 0105 by Olya Michael RN  Outcome: Progressing     Problem: Skin/Tissue Integrity  Goal: Absence of new skin breakdown  Description: 1. Monitor for areas of redness and/or skin breakdown  2. Assess vascular access sites hourly  3. Every 4-6 hours minimum:  Change oxygen saturation probe site  4. Every 4-6 hours:  If on nasal continuous positive airway pressure, respiratory therapy assess nares and determine need for appliance change or resting period.   10/20/2023 1045 by Armin Cortez RN  Outcome: Progressing  10/20/2023 0105 by Olya Michael RN  Outcome: Progressing     Problem: Neurosensory - Adult  Goal: Achieves stable or improved neurological status  10/20/2023 1045 by Armin Cortez RN  Outcome: Progressing  10/20/2023 0105 by Olya Michael RN  Outcome: Progressing  Goal: Achieves maximal functionality and self care  10/20/2023 1045 by Armin Cortez RN  Outcome: Progressing  10/20/2023 0105 by Olya Michael RN  Outcome: Progressing     Problem: Skin/Tissue Integrity - Adult  Goal: Skin integrity remains intact  10/20/2023 1045 by Kartik Johnson RN  Outcome: Progressing  Flowsheets (Taken 10/20/2023 1044)  Skin Integrity Remains Intact: Monitor for areas of redness and/or skin breakdown  10/20/2023 0105 by Nemo Soto RN  Outcome: Progressing  Flowsheets (Taken 10/20/2023 0104)  Skin Integrity Remains Intact:   Monitor for areas of redness and/or skin breakdown   Assess vascular access sites hourly  Goal: Incisions, wounds, or drain sites healing without S/S of infection  10/20/2023 1045 by Kartik Johnson RN  Outcome: Progressing  10/20/2023 0105 by Nemo Soto RN  Outcome: Progressing  Goal: Oral mucous membranes remain intact  10/20/2023 1045 by Kartik Johnson RN  Outcome: Progressing  10/20/2023 0105 by Nemo Soto RN  Outcome: Progressing     Problem: Musculoskeletal - Adult  Goal: Return mobility to safest level of function  10/20/2023 1045 by Kartik Johnson RN  Outcome: Progressing  10/20/2023 0105 by Nemo Soto RN  Outcome: Progressing  Goal: Maintain proper alignment of affected body part  10/20/2023 1045 by Kartik Johnson RN  Outcome: Progressing  10/20/2023 0105 by Nemo Soto RN  Outcome: Progressing  Goal: Return ADL status to a safe level of function  10/20/2023 1045 by Kartik Johnson RN  Outcome: Progressing  10/20/2023 0105 by Nemo Soto RN  Outcome: Progressing     Problem: Infection - Adult  Goal: Absence of infection at discharge  10/20/2023 1045 by Kartik Johnson RN  Outcome: Progressing  10/20/2023 0105 by Nemo Soto RN  Outcome: Progressing  Goal: Absence of infection during hospitalization  10/20/2023 1045 by Kartik Johnson RN  Outcome: Progressing  10/20/2023 0105 by Nemo Soto RN  Outcome: Progressing

## 2023-10-20 NOTE — PROGRESS NOTES
Physical Therapy  Facility/Department: [address] MED SURG  Physical Therapy Daily Note  (cotx)  If patient discharges prior to next session this note will serve as a discharge summary. Please see below for the latest assessment towards goals. Name: Sterling Guerra  : 1933  MRN: [id number]  Date of Service: 10/20/2023    Discharge Recommendations:  Patient would benefit from continued therapy after discharge (3-5)   Sterling Guerra scored a 13/24 on the AM-PAC short mobility form. Current research shows that an AM-PAC score of 17 or less is typically not associated with a discharge to the patient's home setting. Based on the patient's AM-PAC score and their current functional mobility deficits, it is recommended that the patient have 3-5 sessions per week of Physical Therapy at d/c to increase the patient's independence. Please see assessment section for further patient specific details. PT Equipment Recommendations  Equipment Needed: No  Other: defer to next level of care      Patient Diagnosis(es): The primary encounter diagnosis was Urinary tract infection without hematuria, site unspecified. Diagnoses of Altered mental status, unspecified altered mental status type, Frequent falls, and Goals of care, counseling/discussion were also pertinent to this visit. Past Medical History:  has a past medical history of Arthritis, Forgetfulness, Hx of blood clots, Hyperlipidemia, Hypertension, and Wears glasses. Past Surgical History:  has a past surgical history that includes Cholecystectomy; Hysterectomy, total abdominal; hernia repair; and Rectal prolapse repair (N/A, 2022). Assessment   Assessment: Today, the pt con't to have increased anxiety with mobility and a high fear of falling. Her 2 sons in the room are encouraging and appear to help her to calm down. The pt con't to function below her baseline of being able to walk with a rollator and assist of 1.  She con't to need at least mod A of 2 for sit falls  Follows Commands: Impaired  Subjective  Subjective: asked per Dr. Fortunato Neves to see the pt while sons present for an updated note; the pt found to be awake in the bed; 2 sons present in the room and are very hands-on and encouraging to the pt; the pt screams out with attempts at mobility         Social/Functional History  Social/Functional History  Lives With: Spouse  Type of Home: House  Home Layout: Two level, Bed/Bath upstairs (the pt reports she has a chair lift to 2nd floor)  Home Access: Stairs to enter without rails  Entrance Stairs - Number of Steps: 1+1 step without rail  Bathroom Shower/Tub: Walk-in shower  Bathroom Toilet: Handicap height  Bathroom Equipment: Grab bars in shower, Grab bars around toilet  Bathroom Accessibility: Walker accessible  Home Equipment: [employer] water systems, 4 wheeled (gait belt)  Has the patient had two or more falls in the past year or any fall with injury in the past year?: Yes  Receives Help From: Family (son is retired and assists as needed)  Homemaking Assistance:  ( does all)  Homemaking Responsibilities: No  Ambulation Assistance:  (with rollator)  Active : No  Patient's  Info:   Leisure & Hobbies: the pt's son reports he does exercises with her everyday including mind games  Additional Comments: Information gathered from previous evaluation back in 2/2022.  Sons present and able to confirm information      Objective     Bed mobility  Supine to Sit: Minimal assistance (HOB maximally elevated, VC's for use of bedrails, much increased time and encouragement)  Sit to Supine: Unable to assess (pt seated in recliner at end of session)  Scooting: Minimal assistance (min A to scoot to EOB to get feet on floor, SBA with walker placed in front of her to pull on., mod A x2 to scoot back into chair)  Bed Mobility Comments: pt very anxious and fearful of falling; does better when she initiates movement, but she has difficulty sequencing requiring frequent

## 2023-10-20 NOTE — PROGRESS NOTES
Written and verbal d/c instructions reviewed with pt and family member (son), son states understanding, IV removed without complication. Pt wheeled down via wheelchair for d/c.       Electronically signed by Mateo Guillen RN on 10/20/2023 at 4:18 PM

## 2023-10-20 NOTE — PLAN OF CARE
Problem: Confusion  Goal: Confusion, delirium, dementia, or psychosis is improved or at baseline  Description: INTERVENTIONS:  1. Assess for possible contributors to thought disturbance, including medications, impaired vision or hearing, underlying metabolic abnormalities, dehydration, psychiatric diagnoses, and notify attending LIP  2. Roxana high risk fall precautions, as indicated  3. Provide frequent short contacts to provide reality reorientation, refocusing and direction  4. Decrease environmental stimuli, including noise as appropriate  5. Monitor and intervene to maintain adequate nutrition, hydration, elimination, sleep and activity  6. If unable to ensure safety without constant attention obtain sitter and review sitter guidelines with assigned personnel  7. Initiate Psychosocial CNS and Spiritual Care consult, as indicated  Outcome: Progressing     Problem: Skin/Tissue Integrity  Goal: Absence of new skin breakdown  Description: 1. Monitor for areas of redness and/or skin breakdown  2. Assess vascular access sites hourly  3. Every 4-6 hours minimum:  Change oxygen saturation probe site  4. Every 4-6 hours:  If on nasal continuous positive airway pressure, respiratory therapy assess nares and determine need for appliance change or resting period.   Outcome: Progressing     Problem: Neurosensory - Adult  Goal: Achieves stable or improved neurological status  Outcome: Progressing  Goal: Achieves maximal functionality and self care  Outcome: Progressing     Problem: Skin/Tissue Integrity - Adult  Goal: Skin integrity remains intact  Outcome: Progressing  Flowsheets (Taken 10/20/2023 0104)  Skin Integrity Remains Intact:   Monitor for areas of redness and/or skin breakdown   Assess vascular access sites hourly  Goal: Incisions, wounds, or drain sites healing without S/S of infection  Outcome: Progressing  Goal: Oral mucous membranes remain intact  Outcome: Progressing     Problem: Musculoskeletal - Adult  Goal: Return mobility to safest level of function  Outcome: Progressing  Goal: Maintain proper alignment of affected body part  Outcome: Progressing  Goal: Return ADL status to a safe level of function  Outcome: Progressing     Problem: Infection - Adult  Goal: Absence of infection at discharge  Outcome: Progressing  Goal: Absence of infection during hospitalization  Outcome: Progressing

## 2023-10-20 NOTE — PLAN OF CARE
Problem: Confusion  Goal: Confusion, delirium, dementia, or psychosis is improved or at baseline  Description: INTERVENTIONS:  1. Assess for possible contributors to thought disturbance, including medications, impaired vision or hearing, underlying metabolic abnormalities, dehydration, psychiatric diagnoses, and notify attending LIP  2. D[place]agh high risk fall precautions, as indicated  3. Provide frequent short contacts to provide reality reorientation, refocusing and direction  4. Decrease environmental stimuli, including noise as appropriate  5. Monitor and intervene to maintain adequate nutrition, hydration, elimination, sleep and activity  6. If unable to ensure safety without constant attention obtain sitter and review sitter guidelines with assigned personnel  7. Initiate Psychosocial CNS and Spiritual Care consult, as indicated  10/20/2023 1618 by Zoey Carrillo RN  Outcome: Adequate for Discharge  10/20/2023 1045 by Zoey Carrillo RN  Outcome: Progressing     Problem: Skin/Tissue Integrity  Goal: Absence of new skin breakdown  Description: 1. Monitor for areas of redness and/or skin breakdown  2. Assess vascular access sites hourly  3. Every 4-6 hours minimum:  Change oxygen saturation probe site  4. Every 4-6 hours:  If on nasal continuous positive airway pressure, respiratory therapy assess nares and determine need for appliance change or resting period.   10/20/2023 1618 by Zoey Carrillo RN  Outcome: Adequate for Discharge  10/20/2023 1045 by Zeoy Carrillo RN  Outcome: Progressing     Problem: Neurosensory - Adult  Goal: Achieves stable or improved neurological status  10/20/2023 1618 by Zoey Carrillo RN  Outcome: Adequate for Discharge  10/20/2023 1045 by Zoey Carrillo RN  Outcome: Progressing  Goal: Achieves maximal functionality and self care  10/20/2023 1618 by Zoey Carrillo RN  Outcome: Adequate for Discharge  10/20/2023 1045 by Zoey Carrillo RN  Outcome: Progressing Problem: Skin/Tissue Integrity - Adult  Goal: Skin integrity remains intact  10/20/2023 1618 by Keeley Díaz RN  Outcome: Adequate for Discharge  10/20/2023 1045 by Keeley Díaz RN  Outcome: Progressing  Flowsheets (Taken 10/20/2023 1044)  Skin Integrity Remains Intact: Monitor for areas of redness and/or skin breakdown  Goal: Incisions, wounds, or drain sites healing without S/S of infection  10/20/2023 1618 by Keeley Díaz RN  Outcome: Adequate for Discharge  10/20/2023 1045 by Keeley Díaz RN  Outcome: Progressing  Goal: Oral mucous membranes remain intact  10/20/2023 1618 by Keeley Díaz RN  Outcome: Adequate for Discharge  10/20/2023 1045 by Keeley Díaz RN  Outcome: Progressing     Problem: Musculoskeletal - Adult  Goal: Return mobility to safest level of function  10/20/2023 1618 by Keeley Díaz RN  Outcome: Adequate for Discharge  10/20/2023 1045 by Keeley Díaz RN  Outcome: Progressing  Goal: Maintain proper alignment of affected body part  10/20/2023 1618 by Keeley Díaz RN  Outcome: Adequate for Discharge  10/20/2023 1045 by Keeley Díaz RN  Outcome: Progressing  Goal: Return ADL status to a safe level of function  10/20/2023 1618 by Keeley Díaz RN  Outcome: Adequate for Discharge  10/20/2023 1045 by Keeley Díaz RN  Outcome: Progressing     Problem: Infection - Adult  Goal: Absence of infection at discharge  10/20/2023 1618 by Keeley Díaz RN  Outcome: Adequate for Discharge  10/20/2023 1045 by Keeley Díaz RN  Outcome: Progressing  Goal: Absence of infection during hospitalization  10/20/2023 1618 by Keeley Díaz RN  Outcome: Adequate for Discharge  10/20/2023 1045 by Keeley Díza RN  Outcome: Progressing

## 2023-10-20 NOTE — PROGRESS NOTES
Occupational Therapy  Facility/Department: [facility]  Occupational Therapy Daily Treatment Note    Name: Marcia Pedraza  : 1933  MRN: [id number]  Date of Service: 10/20/2023    Discharge Recommendations:  Patient would benefit from continued therapy after discharge, 3-5 sessions per week  OT Equipment Recommendations  Other: defer to d/c facility     Marcia Pedraza scored a 13/24 on the AM-PAC ADL Inpatient form. Current research shows that an AM-PAC score of 17 or less is typically not associated with a discharge to the patient's home setting. Based on the patient's AM-PAC score and their current ADL deficits, it is recommended that the patient have 3-5 sessions per week of Occupational Therapy at d/c to increase the patient's independence. Please see assessment section for further patient specific details. If patient discharges prior to next session this note will serve as a discharge summary. Please see below for the latest assessment towards goals. Patient Diagnosis(es): The primary encounter diagnosis was Urinary tract infection without hematuria, site unspecified. Diagnoses of Altered mental status, unspecified altered mental status type, Frequent falls, and Goals of care, counseling/discussion were also pertinent to this visit. Past Medical History:  has a past medical history of Arthritis, Forgetfulness, Hx of blood clots, Hyperlipidemia, Hypertension, and Wears glasses. Past Surgical History:  has a past surgical history that includes Cholecystectomy; Hysterectomy, total abdominal; hernia repair; and Rectal prolapse repair (N/A, 2022). Treatment Diagnosis: decreased fxl transfers/mobility and ADL status      Assessment   Performance deficits / Impairments: Decreased functional mobility ; Decreased balance;Decreased coordination;Decreased ADL status; Decreased cognition;Decreased ROM; Decreased endurance;Decreased high-level IADLs;Decreased strength  Assessment: Marcia Pedraza

## 2023-10-27 NOTE — PLAN OF CARE
Problem: Falls - Risk of:  Goal: Will remain free from falls  Description: Will remain free from falls  Outcome: Ongoing  Goal: Absence of physical injury  Description: Absence of physical injury  Outcome: Ongoing   Pt free from falls this shift. Fall precautions in place at all times. Call light always within reach. Pt able and agreeable to contact for safety appropriately. Problem: Skin Integrity:  Goal: Will show no infection signs and symptoms  Description: Will show no infection signs and symptoms  Outcome: Ongoing  Goal: Absence of new skin breakdown  Description: Absence of new skin breakdown  Outcome: Ongoing   Skin assessment performed each shift per protocol. Patient turned and repositioned every two hours and prn with pillow support. Patient checked for incontence every two hours. Problem: Pain:  Description: Pain management should include both nonpharmacologic and pharmacologic interventions. Goal: Pain level will decrease  Description: Pain level will decrease  Outcome: Ongoing  Goal: Control of acute pain  Description: Control of acute pain  Outcome: Ongoing  Goal: Control of chronic pain  Description: Control of chronic pain  Outcome: Ongoing   Pt able to express presence/absence of pain and rate pain appropriately using numerical scale. Pain/discomfort being managed with PRN analgesics per MD orders (see MAR). Pain assessed every shift and after interventions. Staging Info: By selecting yes to the question above you will include information on AJCC 8 tumor staging in your Mohs note. Information on tumor staging will be automatically added for SCCs on the head and neck. AJCC 8 includes tumor size, tumor depth, perineural involvement and bone invasion.

## 2024-01-23 NOTE — PROGRESS NOTES
Physical Therapy  Facility/Department: [address] MED SURG  Physical Therapy treatment session- [address] with OT    Name: Cally Mobley  : 1933  MRN: [id number]  Date of Service: 10/19/2023    Discharge Recommendations:  Patient would benefit from continued therapy after discharge (3-5x/wk)   PT Equipment Recommendations  Equipment Needed: No  Other: defer to next level of care    Cally Mobley scored a 9/ on the AM-PAC short mobility form. Current research shows that an AM-PAC score of 17 or less is typically not associated with a discharge to the patient's home setting. Based on the patient's AM-PAC score and their current functional mobility deficits, it is recommended that the patient have 3-5 sessions per week of Physical Therapy at d/c to increase the patient's independence. Please see assessment section for further patient specific details. If patient discharges prior to next session this note will serve as a discharge summary. Please see below for the latest assessment towards goals. Patient Diagnosis(es): The primary encounter diagnosis was Urinary tract infection without hematuria, site unspecified. Diagnoses of Altered mental status, unspecified altered mental status type, Frequent falls, and Goals of care, counseling/discussion were also pertinent to this visit. Past Medical History:  has a past medical history of Arthritis, Forgetfulness, Hx of blood clots, Hyperlipidemia, Hypertension, and Wears glasses. Past Surgical History:  has a past surgical history that includes Cholecystectomy; Hysterectomy, total abdominal; hernia repair; and Rectal prolapse repair (N/A, 2022). Assessment   Body Structures, Functions, Activity Limitations Requiring Skilled Therapeutic Intervention: Decreased functional mobility   Assessment: Today, the pt with increased anxiety and required modAx2 to stand to rollator and unable to ambulate this date.   Recommend that the pt gets additional skilled PT yes

## 2024-02-02 ENCOUNTER — HOSPITAL ENCOUNTER (EMERGENCY)
Age: 89
Discharge: HOME | End: 2024-02-02
Attending: STUDENT IN AN ORGANIZED HEALTH CARE EDUCATION/TRAINING PROGRAM
Payer: MEDICARE

## 2024-02-02 ENCOUNTER — APPOINTMENT (OUTPATIENT)
Dept: GENERAL RADIOLOGY | Age: 89
End: 2024-02-02
Payer: MEDICARE

## 2024-02-02 VITALS
HEIGHT: 62 IN | DIASTOLIC BLOOD PRESSURE: 64 MMHG | OXYGEN SATURATION: 90 % | HEART RATE: 85 BPM | SYSTOLIC BLOOD PRESSURE: 140 MMHG | TEMPERATURE: 97.8 F | BODY MASS INDEX: 26.01 KG/M2 | RESPIRATION RATE: 19 BRPM | WEIGHT: 141.31 LBS

## 2024-02-02 DIAGNOSIS — J44.1 COPD EXACERBATION (HCC): Primary | ICD-10-CM

## 2024-02-02 LAB
ANION GAP SERPL CALCULATED.3IONS-SCNC: 12 MMOL/L (ref 3–16)
BASE EXCESS BLDV CALC-SCNC: 2.1 MMOL/L
BASOPHILS # BLD: 0 K/UL (ref 0–0.2)
BASOPHILS NFR BLD: 0.4 %
BUN SERPL-MCNC: 28 MG/DL (ref 7–20)
CALCIUM SERPL-MCNC: 9.5 MG/DL (ref 8.3–10.6)
CHLORIDE SERPL-SCNC: 97 MMOL/L (ref 99–110)
CO2 BLDV-SCNC: 30 MMOL/L
CO2 SERPL-SCNC: 24 MMOL/L (ref 21–32)
COHGB MFR BLDV: 1.3 %
CREAT SERPL-MCNC: 0.8 MG/DL (ref 0.6–1.2)
DEPRECATED RDW RBC AUTO: 16.5 % (ref 12.4–15.4)
EOSINOPHIL # BLD: 0.3 K/UL (ref 0–0.6)
EOSINOPHIL NFR BLD: 3.9 %
FLUAV RNA UPPER RESP QL NAA+PROBE: NEGATIVE
FLUBV AG NPH QL: NEGATIVE
GFR SERPLBLD CREATININE-BSD FMLA CKD-EPI: >60 ML/MIN/{1.73_M2}
GLUCOSE SERPL-MCNC: 194 MG/DL (ref 70–99)
HCO3 BLDV-SCNC: 29 MMOL/L (ref 23–29)
HCT VFR BLD AUTO: 35.2 % (ref 36–48)
HGB BLD-MCNC: 11.8 G/DL (ref 12–16)
LYMPHOCYTES # BLD: 1.2 K/UL (ref 1–5.1)
LYMPHOCYTES NFR BLD: 17.4 %
MAGNESIUM SERPL-MCNC: 2 MG/DL (ref 1.8–2.4)
MCH RBC QN AUTO: 28.9 PG (ref 26–34)
MCHC RBC AUTO-ENTMCNC: 33.5 G/DL (ref 31–36)
MCV RBC AUTO: 86.3 FL (ref 80–100)
METHGB MFR BLDV: 0.6 %
MONOCYTES # BLD: 0.6 K/UL (ref 0–1.3)
MONOCYTES NFR BLD: 8.4 %
NEUTROPHILS # BLD: 4.9 K/UL (ref 1.7–7.7)
NEUTROPHILS NFR BLD: 69.9 %
NT-PROBNP SERPL-MCNC: 175 PG/ML (ref 0–449)
O2 THERAPY: ABNORMAL
PCO2 BLDV: 52.5 MMHG (ref 40–50)
PH BLDV: 7.35 [PH] (ref 7.35–7.45)
PLATELET # BLD AUTO: 209 K/UL (ref 135–450)
PMV BLD AUTO: 9.4 FL (ref 5–10.5)
PO2 BLDV: 35 MMHG
POTASSIUM SERPL-SCNC: 3.5 MMOL/L (ref 3.5–5.1)
RBC # BLD AUTO: 4.08 M/UL (ref 4–5.2)
SAO2 % BLDV: 61 %
SARS-COV-2 RDRP RESP QL NAA+PROBE: NOT DETECTED
SODIUM SERPL-SCNC: 133 MMOL/L (ref 136–145)
TROPONIN, HIGH SENSITIVITY: 14 NG/L (ref 0–14)
TROPONIN, HIGH SENSITIVITY: 21 NG/L (ref 0–14)
WBC # BLD AUTO: 7 K/UL (ref 4–11)

## 2024-02-02 PROCEDURE — 99284 EMERGENCY DEPT VISIT MOD MDM: CPT

## 2024-02-02 PROCEDURE — 82803 BLOOD GASES ANY COMBINATION: CPT

## 2024-02-02 PROCEDURE — 87635 SARS-COV-2 COVID-19 AMP PRB: CPT

## 2024-02-02 PROCEDURE — 94640 AIRWAY INHALATION TREATMENT: CPT

## 2024-02-02 PROCEDURE — 83735 ASSAY OF MAGNESIUM: CPT

## 2024-02-02 PROCEDURE — 84484 ASSAY OF TROPONIN QUANT: CPT

## 2024-02-02 PROCEDURE — 83880 ASSAY OF NATRIURETIC PEPTIDE: CPT

## 2024-02-02 PROCEDURE — 94760 N-INVAS EAR/PLS OXIMETRY 1: CPT

## 2024-02-02 PROCEDURE — 87804 INFLUENZA ASSAY W/OPTIC: CPT

## 2024-02-02 PROCEDURE — 6370000000 HC RX 637 (ALT 250 FOR IP): Performed by: STUDENT IN AN ORGANIZED HEALTH CARE EDUCATION/TRAINING PROGRAM

## 2024-02-02 PROCEDURE — 80048 BASIC METABOLIC PNL TOTAL CA: CPT

## 2024-02-02 PROCEDURE — 96374 THER/PROPH/DIAG INJ IV PUSH: CPT

## 2024-02-02 PROCEDURE — 2700000000 HC OXYGEN THERAPY PER DAY

## 2024-02-02 PROCEDURE — 85025 COMPLETE CBC W/AUTO DIFF WBC: CPT

## 2024-02-02 PROCEDURE — 6360000002 HC RX W HCPCS: Performed by: STUDENT IN AN ORGANIZED HEALTH CARE EDUCATION/TRAINING PROGRAM

## 2024-02-02 PROCEDURE — 71046 X-RAY EXAM CHEST 2 VIEWS: CPT

## 2024-02-02 RX ORDER — IPRATROPIUM BROMIDE AND ALBUTEROL SULFATE 2.5; .5 MG/3ML; MG/3ML
2 SOLUTION RESPIRATORY (INHALATION) ONCE
Status: COMPLETED | OUTPATIENT
Start: 2024-02-02 | End: 2024-02-02

## 2024-02-02 RX ORDER — METHYLPREDNISOLONE SODIUM SUCCINATE 125 MG/2ML
125 INJECTION, POWDER, LYOPHILIZED, FOR SOLUTION INTRAMUSCULAR; INTRAVENOUS ONCE
Status: COMPLETED | OUTPATIENT
Start: 2024-02-02 | End: 2024-02-02

## 2024-02-02 RX ORDER — PREDNISONE 50 MG/1
50 TABLET ORAL DAILY
Qty: 5 TABLET | Refills: 0 | Status: SHIPPED | OUTPATIENT
Start: 2024-02-03 | End: 2024-02-08

## 2024-02-02 RX ORDER — DOXYCYCLINE HYCLATE 100 MG
100 TABLET ORAL 2 TIMES DAILY
Qty: 14 TABLET | Refills: 0 | Status: SHIPPED | OUTPATIENT
Start: 2024-02-02 | End: 2024-02-09

## 2024-02-02 RX ORDER — IPRATROPIUM BROMIDE AND ALBUTEROL SULFATE 2.5; .5 MG/3ML; MG/3ML
SOLUTION RESPIRATORY (INHALATION)
Status: DISCONTINUED
Start: 2024-02-02 | End: 2024-02-02 | Stop reason: HOSPADM

## 2024-02-02 RX ADMIN — IPRATROPIUM BROMIDE AND ALBUTEROL SULFATE 2 DOSE: .5; 3 SOLUTION RESPIRATORY (INHALATION) at 15:06

## 2024-02-02 RX ADMIN — IPRATROPIUM BROMIDE AND ALBUTEROL SULFATE 2 DOSE: .5; 2.5 SOLUTION RESPIRATORY (INHALATION) at 11:58

## 2024-02-02 RX ADMIN — METHYLPREDNISOLONE SODIUM SUCCINATE 125 MG: 125 INJECTION, POWDER, LYOPHILIZED, FOR SOLUTION INTRAMUSCULAR; INTRAVENOUS at 12:23

## 2024-02-02 ASSESSMENT — PAIN - FUNCTIONAL ASSESSMENT: PAIN_FUNCTIONAL_ASSESSMENT: NONE - DENIES PAIN

## 2024-02-02 NOTE — ED PROVIDER NOTES
[facility] EMERGENCY DEPARTMENT      EMERGENCY MEDICINE     Pt Name: Molly Latif  MRN: [id number]  Birthdate 6/21/1933  Date of evaluation: 2/2/2024  Provider: Napoleon Mcrae MD    CHIEF COMPLAINT       Chief Complaint   Patient presents with    Shortness of Breath     PT has been Sob with productive cough for 2 weeks. Recent UTI fined abx yesterday. Pt was wheezing today so family called EMS. Pt having increasingly frequent falls.      HISTORY OF PRESENT ILLNESS   Molly Latif is a 89 y.o. female who presents to the emergency department for worsening shortness of breath the last 2 weeks as well as intermittently productive cough for the last 2 days.  Patient did have recent UTI.  He is asymptomatic in that regard.  Patient was brought in by EMS from her facility due to wheezing.  She denies having any chest pain.    PASTMEDICAL HISTORY     Past Medical History:   Diagnosis Date    Arthritis     bilateral knee    Forgetfulness     Hx of blood clots 2020    DVT    Hyperlipidemia     Hypertension     Wears glasses     reading       Patient Active Problem List   Diagnosis Code    Rectal prolapse K62.3    Acute metabolic encephalopathy G93.41     SURGICAL HISTORY       Past Surgical History:   Procedure Laterality Date    CHOLECYSTECTOMY      HERNIA REPAIR      hiatal hernia    HYSTERECTOMY, TOTAL ABDOMINAL (CERVIX REMOVED)      complete    RECTAL PROLAPSE REPAIR N/A 2/18/2022    TRANSPERINEAL RECTOSIGMOIDECTOMY (ALTEMEIER PROCEDURE), FLEXIBLE SIGMOIDOSCOPY performed by Tre Wright MD at [facility] OR       CURRENT MEDICATIONS       Previous Medications    AMLODIPINE (NORVASC) 5 MG TABLET    Take 5 mg by mouth nightly     ATORVASTATIN (LIPITOR) 20 MG TABLET    Take 20 mg by mouth at bedtime     CALCIUM CARBONATE (OSCAL) 500 MG TABS TABLET    Take 400 mg by mouth 2 times daily    DOCUSATE SODIUM (COLACE) 100 MG CAPSULE    Take 1 capsule by mouth 2 times daily as needed for  pneumothorax.  She will be treated as a COPD exacerbation and given antibiotics to go home with as well as she has some signs that she is having clinical development of a pneumonia.  She is comfortable and not tachypneic, no longer having significant wheezing only minimal wheezing present at this time.  Will do a few more DuoNeb treatments she will be sent back to the nursing facility for further evaluation.      Consults (none if blank):        Shared Decision-Making was performed and disposition discussed with the patient and son and their questions were answered     Social determinants of health impacting treatment or disposition:  None      Code Status:    Not addressed at this visit      Vitals Reviewed:    Vitals:    02/02/24 1400 02/02/24 1415 02/02/24 1420 02/02/24 1430   BP: (!) 126/56      Pulse: 63 76 78 70   Resp: 16 20 18 19   Temp:       TempSrc:       SpO2: 93% 91% 92% 90%   Weight:       Height:           The patient was seen and examined. Appropriate diagnostic testing was performed and results reviewed with the patients sons and patient .    The results of pertinent diagnostic studies and exam findings were discussed. The patient’s provisional diagnosis and plan of care were discussed with the patient and son who expressed a complete understanding. Any medications were reviewed and indications and risks of medications were discussed with the patient and son    Strict verbal and written return precautions, instructions and appropriate follow-up were provided as well..     ED Medications administered this visit:  (None if blank)  Medications   ipratropium 0.5 mg-albuterol 2.5 mg (DUONEB) 0.5-2.5 (3) MG/3ML nebulizer solution (  Not Given 2/2/24 1204)   ipratropium 0.5 mg-albuterol 2.5 mg (DUONEB) nebulizer solution 2 Dose (has no administration in time range)   ipratropium 0.5 mg-albuterol 2.5 mg (DUONEB) nebulizer solution 2 Dose (2 Doses Inhalation Given 2/2/24 1158)   methylPREDNISolone sodium

## 2024-02-02 NOTE — ED TRIAGE NOTES
PT has been Sob with productive cough for 2 weeks. Recent UTI fined abx yesterday. Pt was wheezing today so family called EMS. Pt having increasingly frequent falls.

## 2025-02-23 ENCOUNTER — HOSPITAL ENCOUNTER (INPATIENT)
Age: 89
LOS: 6 days | Discharge: OTHER INSTITUTION | End: 2025-03-01
Attending: EMERGENCY MEDICINE
Payer: MEDICARE

## 2025-02-23 ENCOUNTER — APPOINTMENT (OUTPATIENT)
Dept: CT IMAGING | Age: 89
End: 2025-02-23
Payer: MEDICARE

## 2025-02-23 ENCOUNTER — APPOINTMENT (OUTPATIENT)
Dept: GENERAL RADIOLOGY | Age: 89
End: 2025-02-23
Payer: MEDICARE

## 2025-02-23 DIAGNOSIS — K11.20 SIALOADENITIS: ICD-10-CM

## 2025-02-23 DIAGNOSIS — R63.8 POOR FLUID INTAKE: Primary | ICD-10-CM

## 2025-02-23 DIAGNOSIS — K11.3 SALIVARY GLAND ABSCESS: ICD-10-CM

## 2025-02-23 LAB
ANION GAP SERPL CALCULATED.3IONS-SCNC: 11 MMOL/L (ref 3–16)
BASOPHILS # BLD: 0 K/UL (ref 0–0.2)
BASOPHILS NFR BLD: 0.3 %
BUN SERPL-MCNC: 19 MG/DL (ref 7–20)
CALCIUM SERPL-MCNC: 9.7 MG/DL (ref 8.3–10.6)
CHLORIDE SERPL-SCNC: 93 MMOL/L (ref 99–110)
CO2 SERPL-SCNC: 30 MMOL/L (ref 21–32)
CREAT SERPL-MCNC: 0.7 MG/DL (ref 0.6–1.2)
DEPRECATED RDW RBC AUTO: 14.6 % (ref 12.4–15.4)
EOSINOPHIL # BLD: 0.2 K/UL (ref 0–0.6)
EOSINOPHIL NFR BLD: 2.3 %
FLUAV + FLUBV AG NOSE IA.RAPID: NOT DETECTED
FLUAV + FLUBV AG NOSE IA.RAPID: NOT DETECTED
GFR SERPLBLD CREATININE-BSD FMLA CKD-EPI: 81 ML/MIN/{1.73_M2}
GLUCOSE SERPL-MCNC: 112 MG/DL (ref 70–99)
HCT VFR BLD AUTO: 37.2 % (ref 36–48)
HGB BLD-MCNC: 12.3 G/DL (ref 12–16)
LYMPHOCYTES # BLD: 0.9 K/UL (ref 1–5.1)
LYMPHOCYTES NFR BLD: 10 %
MCH RBC QN AUTO: 29.2 PG (ref 26–34)
MCHC RBC AUTO-ENTMCNC: 33.1 G/DL (ref 31–36)
MCV RBC AUTO: 88.2 FL (ref 80–100)
MONOCYTES # BLD: 0.7 K/UL (ref 0–1.3)
MONOCYTES NFR BLD: 7.8 %
NEUTROPHILS # BLD: 7.4 K/UL (ref 1.7–7.7)
NEUTROPHILS NFR BLD: 79.6 %
PLATELET # BLD AUTO: 291 K/UL (ref 135–450)
PMV BLD AUTO: 9.8 FL (ref 5–10.5)
POTASSIUM SERPL-SCNC: 3.9 MMOL/L (ref 3.5–5.1)
RBC # BLD AUTO: 4.21 M/UL (ref 4–5.2)
SARS-COV-2 RDRP RESP QL NAA+PROBE: NOT DETECTED
SODIUM SERPL-SCNC: 134 MMOL/L (ref 136–145)
WBC # BLD AUTO: 9.2 K/UL (ref 4–11)

## 2025-02-23 PROCEDURE — 99285 EMERGENCY DEPT VISIT HI MDM: CPT

## 2025-02-23 PROCEDURE — 1200000000 HC SEMI PRIVATE

## 2025-02-23 PROCEDURE — 87502 INFLUENZA DNA AMP PROBE: CPT

## 2025-02-23 PROCEDURE — 80048 BASIC METABOLIC PNL TOTAL CA: CPT

## 2025-02-23 PROCEDURE — 6360000002 HC RX W HCPCS

## 2025-02-23 PROCEDURE — 2580000003 HC RX 258: Performed by: EMERGENCY MEDICINE

## 2025-02-23 PROCEDURE — 96374 THER/PROPH/DIAG INJ IV PUSH: CPT

## 2025-02-23 PROCEDURE — 6370000000 HC RX 637 (ALT 250 FOR IP)

## 2025-02-23 PROCEDURE — 96361 HYDRATE IV INFUSION ADD-ON: CPT

## 2025-02-23 PROCEDURE — 2580000003 HC RX 258

## 2025-02-23 PROCEDURE — 87635 SARS-COV-2 COVID-19 AMP PRB: CPT

## 2025-02-23 PROCEDURE — 71045 X-RAY EXAM CHEST 1 VIEW: CPT

## 2025-02-23 PROCEDURE — 70491 CT SOFT TISSUE NECK W/DYE: CPT

## 2025-02-23 PROCEDURE — 36415 COLL VENOUS BLD VENIPUNCTURE: CPT

## 2025-02-23 PROCEDURE — 6360000002 HC RX W HCPCS: Performed by: EMERGENCY MEDICINE

## 2025-02-23 PROCEDURE — 6360000004 HC RX CONTRAST MEDICATION: Performed by: EMERGENCY MEDICINE

## 2025-02-23 PROCEDURE — 85025 COMPLETE CBC W/AUTO DIFF WBC: CPT

## 2025-02-23 RX ORDER — POLYETHYLENE GLYCOL 3350 17 G/17G
17 POWDER, FOR SOLUTION ORAL DAILY PRN
Status: DISCONTINUED | OUTPATIENT
Start: 2025-02-23 | End: 2025-03-01 | Stop reason: HOSPADM

## 2025-02-23 RX ORDER — SODIUM CHLORIDE 9 MG/ML
INJECTION, SOLUTION INTRAVENOUS PRN
Status: DISCONTINUED | OUTPATIENT
Start: 2025-02-23 | End: 2025-02-27

## 2025-02-23 RX ORDER — ONDANSETRON 4 MG/1
4 TABLET, FILM COATED ORAL EVERY 6 HOURS PRN
COMMUNITY

## 2025-02-23 RX ORDER — TRIAMTERENE AND HYDROCHLOROTHIAZIDE 37.5; 25 MG/1; MG/1
1 TABLET ORAL DAILY
Status: DISCONTINUED | OUTPATIENT
Start: 2025-02-24 | End: 2025-03-01 | Stop reason: HOSPADM

## 2025-02-23 RX ORDER — SODIUM CHLORIDE 0.9 % (FLUSH) 0.9 %
5-40 SYRINGE (ML) INJECTION EVERY 12 HOURS SCHEDULED
Status: DISCONTINUED | OUTPATIENT
Start: 2025-02-23 | End: 2025-03-01 | Stop reason: HOSPADM

## 2025-02-23 RX ORDER — IPRATROPIUM BROMIDE AND ALBUTEROL SULFATE 2.5; .5 MG/3ML; MG/3ML
1 SOLUTION RESPIRATORY (INHALATION) EVERY 6 HOURS PRN
COMMUNITY

## 2025-02-23 RX ORDER — NYSTATIN 100000 [USP'U]/G
POWDER TOPICAL
COMMUNITY

## 2025-02-23 RX ORDER — POLYETHYLENE GLYCOL 3350 17 G/17G
17 POWDER, FOR SOLUTION ORAL DAILY PRN
COMMUNITY

## 2025-02-23 RX ORDER — POTASSIUM CHLORIDE 7.45 MG/ML
10 INJECTION INTRAVENOUS PRN
Status: DISCONTINUED | OUTPATIENT
Start: 2025-02-23 | End: 2025-03-01 | Stop reason: HOSPADM

## 2025-02-23 RX ORDER — GUAIFENESIN/DEXTROMETHORPHAN 100-10MG/5
10 SYRUP ORAL EVERY 6 HOURS PRN
COMMUNITY

## 2025-02-23 RX ORDER — ACETAMINOPHEN 500 MG
1000 TABLET ORAL 3 TIMES DAILY
COMMUNITY

## 2025-02-23 RX ORDER — ALBUTEROL SULFATE 90 UG/1
2 INHALANT RESPIRATORY (INHALATION) EVERY 6 HOURS PRN
COMMUNITY

## 2025-02-23 RX ORDER — MAGNESIUM SULFATE IN WATER 40 MG/ML
2000 INJECTION, SOLUTION INTRAVENOUS PRN
Status: DISCONTINUED | OUTPATIENT
Start: 2025-02-23 | End: 2025-03-01 | Stop reason: HOSPADM

## 2025-02-23 RX ORDER — SODIUM CHLORIDE 0.9 % (FLUSH) 0.9 %
5-40 SYRINGE (ML) INJECTION PRN
Status: DISCONTINUED | OUTPATIENT
Start: 2025-02-23 | End: 2025-03-01 | Stop reason: HOSPADM

## 2025-02-23 RX ORDER — ACETAMINOPHEN 325 MG/1
650 TABLET ORAL EVERY 6 HOURS PRN
Status: DISCONTINUED | OUTPATIENT
Start: 2025-02-23 | End: 2025-03-01 | Stop reason: HOSPADM

## 2025-02-23 RX ORDER — ACETAMINOPHEN 650 MG/1
650 SUPPOSITORY RECTAL EVERY 6 HOURS PRN
Status: DISCONTINUED | OUTPATIENT
Start: 2025-02-23 | End: 2025-03-01 | Stop reason: HOSPADM

## 2025-02-23 RX ORDER — SODIUM CHLORIDE 9 MG/ML
INJECTION, SOLUTION INTRAVENOUS CONTINUOUS
Status: DISCONTINUED | OUTPATIENT
Start: 2025-02-23 | End: 2025-02-26

## 2025-02-23 RX ORDER — IOPAMIDOL 755 MG/ML
75 INJECTION, SOLUTION INTRAVASCULAR
Status: COMPLETED | OUTPATIENT
Start: 2025-02-23 | End: 2025-02-23

## 2025-02-23 RX ORDER — 0.9 % SODIUM CHLORIDE 0.9 %
500 INTRAVENOUS SOLUTION INTRAVENOUS ONCE
Status: COMPLETED | OUTPATIENT
Start: 2025-02-23 | End: 2025-02-23

## 2025-02-23 RX ORDER — AMOXICILLIN AND CLAVULANATE POTASSIUM 500; 125 MG/1; MG/1
1 TABLET, FILM COATED ORAL 2 TIMES DAILY
Status: ON HOLD | COMMUNITY
Start: 2025-02-21 | End: 2025-03-01 | Stop reason: HOSPADM

## 2025-02-23 RX ORDER — ENOXAPARIN SODIUM 100 MG/ML
40 INJECTION SUBCUTANEOUS NIGHTLY
Status: DISCONTINUED | OUTPATIENT
Start: 2025-02-23 | End: 2025-03-01 | Stop reason: HOSPADM

## 2025-02-23 RX ORDER — CETIRIZINE HYDROCHLORIDE 10 MG/1
10 TABLET ORAL DAILY
COMMUNITY

## 2025-02-23 RX ORDER — AMLODIPINE BESYLATE 5 MG/1
5 TABLET ORAL NIGHTLY
Status: DISCONTINUED | OUTPATIENT
Start: 2025-02-23 | End: 2025-03-01 | Stop reason: HOSPADM

## 2025-02-23 RX ORDER — ESTRADIOL 0.1 MG/G
CREAM VAGINAL SEE ADMIN INSTRUCTIONS
COMMUNITY

## 2025-02-23 RX ORDER — CALCIUM CARBONATE 500 MG/1
1 TABLET, CHEWABLE ORAL 2 TIMES DAILY
COMMUNITY

## 2025-02-23 RX ORDER — POTASSIUM CHLORIDE 1500 MG/1
40 TABLET, EXTENDED RELEASE ORAL PRN
Status: DISCONTINUED | OUTPATIENT
Start: 2025-02-23 | End: 2025-03-01 | Stop reason: HOSPADM

## 2025-02-23 RX ORDER — MENTHOL AND ZINC OXIDE .44; 20.625 G/100G; G/100G
OINTMENT TOPICAL
COMMUNITY

## 2025-02-23 RX ORDER — ONDANSETRON 2 MG/ML
4 INJECTION INTRAMUSCULAR; INTRAVENOUS EVERY 6 HOURS PRN
Status: DISCONTINUED | OUTPATIENT
Start: 2025-02-23 | End: 2025-03-01 | Stop reason: HOSPADM

## 2025-02-23 RX ORDER — ONDANSETRON 4 MG/1
4 TABLET, ORALLY DISINTEGRATING ORAL EVERY 8 HOURS PRN
Status: DISCONTINUED | OUTPATIENT
Start: 2025-02-23 | End: 2025-03-01 | Stop reason: HOSPADM

## 2025-02-23 RX ORDER — GUAIFENESIN 600 MG/1
600 TABLET, EXTENDED RELEASE ORAL 2 TIMES DAILY PRN
COMMUNITY

## 2025-02-23 RX ADMIN — ACETAMINOPHEN 650 MG: 325 TABLET ORAL at 23:03

## 2025-02-23 RX ADMIN — SODIUM CHLORIDE 3000 MG: 900 INJECTION INTRAVENOUS at 20:42

## 2025-02-23 RX ADMIN — SODIUM CHLORIDE 500 ML: 9 INJECTION, SOLUTION INTRAVENOUS at 13:31

## 2025-02-23 RX ADMIN — ENOXAPARIN SODIUM 40 MG: 100 INJECTION SUBCUTANEOUS at 20:42

## 2025-02-23 RX ADMIN — IOPAMIDOL 75 ML: 755 INJECTION, SOLUTION INTRAVENOUS at 12:39

## 2025-02-23 RX ADMIN — AMLODIPINE BESYLATE 5 MG: 5 TABLET ORAL at 20:42

## 2025-02-23 RX ADMIN — ONDANSETRON 4 MG: 2 INJECTION, SOLUTION INTRAMUSCULAR; INTRAVENOUS at 18:02

## 2025-02-23 RX ADMIN — SODIUM CHLORIDE 3000 MG: 900 INJECTION INTRAVENOUS at 14:20

## 2025-02-23 RX ADMIN — SODIUM CHLORIDE: 9 INJECTION, SOLUTION INTRAVENOUS at 16:34

## 2025-02-23 ASSESSMENT — PAIN SCALES - GENERAL
PAINLEVEL_OUTOF10: 4
PAINLEVEL_OUTOF10: 4
PAINLEVEL_OUTOF10: 0

## 2025-02-23 ASSESSMENT — PAIN DESCRIPTION - DESCRIPTORS: DESCRIPTORS: ACHING;DISCOMFORT

## 2025-02-23 ASSESSMENT — PAIN DESCRIPTION - LOCATION: LOCATION: GENERALIZED

## 2025-02-23 NOTE — PROGRESS NOTES
PT was admitted to unit. Sons at bedside. Pt from memory unit. Pt alert to self only. Pt reported pain level at 6. Due to Roque Score under 18 we tried to put pump on bed but all three pumps available all had broken red caps so I was not able to attach one to her bed. All admission questions answered by son Noble. We ordered patient dinner. All extra sheets removed under patient. Patient is lying left side. Bed locked, lowest position, call light within reach. No further needs at this time.

## 2025-02-23 NOTE — H&P
Name:  Molly Latif /Age/Sex: 1933  (89 y.o. female)   MRN & CSN:  [id number] & [id number] Encounter Date/Time: 2025 2:47 PM EST   Location:  [address] [place] [address] PCP: Elinor Hurst DO     Attending:Albert Garcia MD       Molly Latif is a 89 y.o. female who presented with     Chief Complaint   Patient presents with    Illness     Pt sent from memory care. Pt has been getting over upper resp illness. Pt has not been feeling well this week.          Assessment and Plan     Sialoadenitis  Left sided masticatory myositis  Left medial pterygoid abscess  Poor oral intake   As seen on imaging   Started on IV Unasyn and IVF   Clear liquid diet for now pending nursing swallow evaluation   Aspiration precautions   Warm compresses   ENT consulted from ER    History of dementia   Lives in a memory care unit currently    Essential hypertension   Continue home medications    Constipation   Bowel regimen    DVT prophylaxis   Lovenox    HPI :      Molly Latif is a 89 y.o. female with  has a past medical history of Arthritis, Forgetfulness, Hx of blood clots, Hyperlipidemia, Hypertension, and Wears glasses. who presented with chief complaints of left sided facial swelling. Patient has a history of dementia so history was obtained from son present at bedside. According to son patient has been having a lot of drooling recently and has noticed left sided facial swelling since Tuesday. Patient denies any localized pain but has generalized pain all over her body which is chronic. Denies any fever, chills, shortness of breath, nausea and vomiting    Review of Systems:      10 point ROS negative except stated above    Objective:     No intake or output data in the 24 hours ending 25 1447   Vitals:   Vitals:    25 1119 25 1120 25 1130 25 1215   BP: (!) 141/62  (!) 115/55    Pulse: 88  86 85   Resp: 18  16 19   Temp:  98.8 °F (37.1 °C)     SpO2: 93%  93% 94%       Physical Exam:        General:  NAD  Eyes: EOMI  ENT: Left sided swelling of face, clenching teeth on attempting to open mouth  Cardiovascular: Regular rate.  Respiratory: Clear to auscultation  Gastrointestinal: Soft, non tender  Genitourinary: no suprapubic tenderness  Musculoskeletal: No edema  Neuro: Alert and oriented*1  Psych: Mood appropriate.     Medications:     Medications:    ampicillin-sulbactam  3,000 mg IntraVENous Once    sodium chloride flush  5-40 mL IntraVENous 2 times per day    enoxaparin  40 mg SubCUTAneous Daily    ampicillin-sulbactam  3,000 mg IntraVENous Q6H    amLODIPine  5 mg Oral Nightly    triamterene-hydroCHLOROthiazide  1 tablet Oral Daily      Infusions:    sodium chloride      sodium chloride       PRN Meds: sodium chloride flush, 5-40 mL, PRN  sodium chloride, , PRN  potassium chloride, 40 mEq, PRN   Or  potassium alternative oral replacement, 40 mEq, PRN   Or  potassium chloride, 10 mEq, PRN  magnesium sulfate, 2,000 mg, PRN  ondansetron, 4 mg, Q8H PRN   Or  ondansetron, 4 mg, Q6H PRN  polyethylene glycol, 17 g, Daily PRN  acetaminophen, 650 mg, Q6H PRN   Or  acetaminophen, 650 mg, Q6H PRN        Labs and Imaging     Recent Labs     02/23/25  1153   WBC 9.2   HGB 12.3      MCV 88.2   *   K 3.9   CL 93*   CO2 30   BUN 19   CREATININE 0.7   GLUCOSE 112*   CALCIUM 9.7       CT SOFT TISSUE NECK W CONTRAST    Result Date: 2/23/2025  1. Evidence of myositis of the left medial pterygoid, lateral pterygoid and overlying temporalis. 10 mm abscess involving the left medial pterygoid muscle adjacent to the inner cortex of the left mandible at the site of the mandibular foramen. 2. Enlargement and likely intraglandular abscess of the minor salivary glands along the left floor of the mouth, limited in evaluation due to extensive streak artifact from the dental amalgam. 3. Left submandibular sialoadenitis.     XR CHEST PORTABLE    Result Date: 2/23/2025  Small left pleural effusion Otherwise, no acute

## 2025-02-23 NOTE — PROGRESS NOTES
4 Eyes Skin Assessment     NAME:  Molly Latif  YOB: 1933  MEDICAL RECORD NUMBER:  [id number]    The patient is being assessed for  Admission    I agree that at least one RN has performed a thorough Head to Toe Skin Assessment on the patient. ALL assessment sites listed below have been assessed.      Areas assessed by both nurses:    Head, Face, Ears, Shoulders, Back, Chest, Arms, Elbows, Hands, Sacrum. Buttock, Coccyx, Ischium, Legs. Feet and Heels, and Under Medical Devices         Does the Patient have a Wound? No noted wound(s)       Roque Prevention initiated by RN: Yes  Wound Care Orders initiated by RN: No    Pressure Injury (Stage 3,4, Unstageable, DTI, NWPT, and Complex wounds) if present, place Wound referral order by RN under : No    New Ostomies, if present place, Ostomy referral order under : No     Nurse 1 eSignature: Electronically signed by Valencia Dale RN on 2/23/25 at 5:04 PM EST    **SHARE this note so that the co-signing nurse can place an eSignature**    Nurse 2 eSignature: Electronically signed by Flavia Hurley RN on 2/23/25 at 5:05 PM EST

## 2025-02-23 NOTE — PROGRESS NOTES
Medication Reconciliation    List of medications patient is currently taking is complete.     Source of information: 1. Medication Report from [facility]                                      2. EPIC records      Allergies  Patient has no known allergies.     Notes regarding home medications:     1. According to times listed on report, patient received all of her morning home medications prior to arrival to the emergency department.    2. Patient was prescribed Augmentin 500-125 BID for 7 days on 2/21/25.    Dora Roberto, Pharmacy Intern  2/23/2025 2:28 PM

## 2025-02-23 NOTE — ED PROVIDER NOTES
[facility] EMERGENCY DEPARTMENT    CHIEF COMPLAINT  Illness (Pt sent from memory care. Pt has been getting over upper resp illness. Pt has not been feeling well this week.)       HISTORY OF PRESENT ILLNESS  Molly Latif is a 89 y.o. female who presents to the ED w/ several concerns.     She is brought by her son from memory care unit.  She has been having swelling on the left side of her face/cheek, reluctant to open her mouth, poor appetite the last few days. She had nausea/vomiting today. She has been reporting pain.    She was constipated for a few days. SNF gave her miralax and she had a BM today.   Patient provides minimal hx but denies abdominal pain.     She has had cough since January, son feels like phlegm has increased. Denies fever.         I have reviewed the following from the nursing documentation:    Past Medical History:   Diagnosis Date    Arthritis     bilateral knee    Forgetfulness     Hx of blood clots 2020    DVT    Hyperlipidemia     Hypertension     Wears glasses     reading     Past Surgical History:   Procedure Laterality Date    CHOLECYSTECTOMY      HERNIA REPAIR      hiatal hernia    HYSTERECTOMY, TOTAL ABDOMINAL (CERVIX REMOVED)      complete    RECTAL PROLAPSE REPAIR N/A 2/18/2022    TRANSPERINEAL RECTOSIGMOIDECTOMY (ALTEMEIER PROCEDURE), FLEXIBLE SIGMOIDOSCOPY performed by Tre Wright MD at [facility] OR     Family History   Problem Relation Age of Onset    Dementia Mother     Heart Disease Father     Depression Father      Social History     Socioeconomic History    Marital status:      Spouse name: Not on file    Number of children: Not on file    Years of education: Not on file    Highest education level: Not on file   Occupational History    Not on file   Tobacco Use    Smoking status: Never    Smokeless tobacco: Never   Vaping Use    Vaping status: Not on file   Substance and Sexual Activity    Alcohol use: Yes     Comment: socially--wine    Drug use: Never     Monocytes % 7.8 %    Eosinophils % 2.3 %    Basophils % 0.3 %    Neutrophils Absolute 7.4 1.7 - 7.7 K/uL    Lymphocytes Absolute 0.9 (L) 1.0 - 5.1 K/uL    Monocytes Absolute 0.7 0.0 - 1.3 K/uL    Eosinophils Absolute 0.2 0.0 - 0.6 K/uL    Basophils Absolute 0.0 0.0 - 0.2 K/uL   BMP w/ Reflex to MG   Result Value Ref Range    Sodium 134 (L) 136 - 145 mmol/L    Potassium reflex Magnesium 3.9 3.5 - 5.1 mmol/L    Chloride 93 (L) 99 - 110 mmol/L    CO2 30 21 - 32 mmol/L    Anion Gap 11 3 - 16    Glucose 112 (H) 70 - 99 mg/dL    BUN 19 7 - 20 mg/dL    Creatinine 0.7 0.6 - 1.2 mg/dL    Est, Glom Filt Rate 81 >60    Calcium 9.7 8.3 - 10.6 mg/dL         RADIOLOGY  I have reviewed all radiographic studies for this visit.   CT SOFT TISSUE NECK W CONTRAST   Final Result   1. Evidence of myositis of the left medial pterygoid, lateral pterygoid and   overlying temporalis. 10 mm abscess involving the left medial pterygoid   muscle adjacent to the inner cortex of the left mandible at the site of the   mandibular foramen.   2. Enlargement and likely intraglandular abscess of the minor salivary glands   along the left floor of the mouth, limited in evaluation due to extensive   streak artifact from the dental amalgam.   3. Left submandibular sialoadenitis.         XR CHEST PORTABLE   Final Result   Small left pleural effusion      Otherwise, no acute cardiopulmonary process              ED COURSE/MDM  This is a 89-year-old woman with history of dementia who presents with several days of left-sided facial swelling, poor oral intake and reluctance to open her mouth. Clinical exam was somewhat difficult due to patient's cognitive status and ability to cooperate. However, she does appear to be protecting her airway and managing her secretions without difficulty. She does not have significant trismus. She has dry mucous membranes. CT shows sialoadenitis, intraglandular abscess of salivary gland, likely parotitis and abscess associated

## 2025-02-24 LAB
ANION GAP SERPL CALCULATED.3IONS-SCNC: 10 MMOL/L (ref 3–16)
BASOPHILS # BLD: 0.1 K/UL (ref 0–0.2)
BASOPHILS NFR BLD: 0.9 %
BILIRUB UR QL STRIP.AUTO: NEGATIVE
BUN SERPL-MCNC: 16 MG/DL (ref 7–20)
CALCIUM SERPL-MCNC: 8.7 MG/DL (ref 8.3–10.6)
CHLORIDE SERPL-SCNC: 102 MMOL/L (ref 99–110)
CLARITY UR: CLEAR
CO2 SERPL-SCNC: 26 MMOL/L (ref 21–32)
COLOR UR: YELLOW
CREAT SERPL-MCNC: 0.7 MG/DL (ref 0.6–1.2)
DEPRECATED RDW RBC AUTO: 15 % (ref 12.4–15.4)
EOSINOPHIL # BLD: 0.3 K/UL (ref 0–0.6)
EOSINOPHIL NFR BLD: 2.9 %
GFR SERPLBLD CREATININE-BSD FMLA CKD-EPI: 81 ML/MIN/{1.73_M2}
GLUCOSE SERPL-MCNC: 102 MG/DL (ref 70–99)
GLUCOSE UR STRIP.AUTO-MCNC: NEGATIVE MG/DL
HCT VFR BLD AUTO: 32.5 % (ref 36–48)
HGB BLD-MCNC: 11 G/DL (ref 12–16)
HGB UR QL STRIP.AUTO: NEGATIVE
KETONES UR STRIP.AUTO-MCNC: NEGATIVE MG/DL
LEUKOCYTE ESTERASE UR QL STRIP.AUTO: NEGATIVE
LYMPHOCYTES # BLD: 1.1 K/UL (ref 1–5.1)
LYMPHOCYTES NFR BLD: 11.6 %
MCH RBC QN AUTO: 29.9 PG (ref 26–34)
MCHC RBC AUTO-ENTMCNC: 33.9 G/DL (ref 31–36)
MCV RBC AUTO: 88.3 FL (ref 80–100)
MONOCYTES # BLD: 0.9 K/UL (ref 0–1.3)
MONOCYTES NFR BLD: 9.6 %
NEUTROPHILS # BLD: 6.9 K/UL (ref 1.7–7.7)
NEUTROPHILS NFR BLD: 75 %
NITRITE UR QL STRIP.AUTO: NEGATIVE
PH UR STRIP.AUTO: 8 [PH] (ref 5–8)
PLATELET # BLD AUTO: 260 K/UL (ref 135–450)
PMV BLD AUTO: 9.7 FL (ref 5–10.5)
POTASSIUM SERPL-SCNC: 3.9 MMOL/L (ref 3.5–5.1)
PROT UR STRIP.AUTO-MCNC: NEGATIVE MG/DL
RBC # BLD AUTO: 3.68 M/UL (ref 4–5.2)
SODIUM SERPL-SCNC: 138 MMOL/L (ref 136–145)
SP GR UR STRIP.AUTO: 1.02 (ref 1–1.03)
UA COMPLETE W REFLEX CULTURE PNL UR: NORMAL
UA DIPSTICK W REFLEX MICRO PNL UR: NORMAL
URN SPEC COLLECT METH UR: NORMAL
UROBILINOGEN UR STRIP-ACNC: 0.2 E.U./DL
WBC # BLD AUTO: 9.2 K/UL (ref 4–11)

## 2025-02-24 PROCEDURE — 6360000002 HC RX W HCPCS

## 2025-02-24 PROCEDURE — 94760 N-INVAS EAR/PLS OXIMETRY 1: CPT

## 2025-02-24 PROCEDURE — 85025 COMPLETE CBC W/AUTO DIFF WBC: CPT

## 2025-02-24 PROCEDURE — 99222 1ST HOSP IP/OBS MODERATE 55: CPT | Performed by: OTOLARYNGOLOGY

## 2025-02-24 PROCEDURE — 36415 COLL VENOUS BLD VENIPUNCTURE: CPT

## 2025-02-24 PROCEDURE — 81003 URINALYSIS AUTO W/O SCOPE: CPT

## 2025-02-24 PROCEDURE — 2500000003 HC RX 250 WO HCPCS

## 2025-02-24 PROCEDURE — 6370000000 HC RX 637 (ALT 250 FOR IP)

## 2025-02-24 PROCEDURE — 2580000003 HC RX 258

## 2025-02-24 PROCEDURE — 1200000000 HC SEMI PRIVATE

## 2025-02-24 PROCEDURE — 80048 BASIC METABOLIC PNL TOTAL CA: CPT

## 2025-02-24 RX ADMIN — SODIUM CHLORIDE 3000 MG: 900 INJECTION INTRAVENOUS at 03:21

## 2025-02-24 RX ADMIN — AMLODIPINE BESYLATE 5 MG: 5 TABLET ORAL at 21:42

## 2025-02-24 RX ADMIN — SODIUM CHLORIDE, PRESERVATIVE FREE 10 ML: 5 INJECTION INTRAVENOUS at 21:42

## 2025-02-24 RX ADMIN — ONDANSETRON 4 MG: 2 INJECTION, SOLUTION INTRAMUSCULAR; INTRAVENOUS at 17:58

## 2025-02-24 RX ADMIN — TRIAMTERENE AND HYDROCHLOROTHIAZIDE 1 TABLET: 25; 37.5 TABLET ORAL at 08:29

## 2025-02-24 RX ADMIN — SODIUM CHLORIDE 3000 MG: 900 INJECTION INTRAVENOUS at 22:33

## 2025-02-24 RX ADMIN — SODIUM CHLORIDE: 9 INJECTION, SOLUTION INTRAVENOUS at 03:19

## 2025-02-24 RX ADMIN — SODIUM CHLORIDE: 9 INJECTION, SOLUTION INTRAVENOUS at 14:50

## 2025-02-24 RX ADMIN — SODIUM CHLORIDE 3000 MG: 900 INJECTION INTRAVENOUS at 08:29

## 2025-02-24 RX ADMIN — SODIUM CHLORIDE 3000 MG: 900 INJECTION INTRAVENOUS at 14:52

## 2025-02-24 NOTE — CARE COORDINATION
Case Management Assessment  Initial Evaluation    Date/Time of Evaluation: 2/24/2025 10:44 AM  Assessment Completed by: VANDA Walker    If patient is discharged prior to next notation, then this note serves as note for discharge by case management.    Patient Name: Molly Latif                   YOB: 1933  Diagnosis: Sialoadenitis [K11.20]  Salivary gland abscess [K11.3]  Poor fluid intake [R63.8]                   Date / Time: 2/23/2025 11:10 AM    Patient Admission Status: Inpatient   Readmission Risk (Low < 19, Mod (19-27), High > 27): Readmission Risk Score: 9.7    Current PCP: Elinor Hurst, DO  PCP verified by CM? (P) Yes (Pt is now seeing NP, Meri Wilson at [facility])    Chart Reviewed: Yes      History Provided by: (P) Child/Family, Spouse  Patient Orientation: (P) Other (see comment) (Spoke with spouse and son, Carmen as pt has dementia)    Patient Cognition: (P) Dementia / Early Alzheimer's    Hospitalization in the last 30 days (Readmission):  No    If yes, Readmission Assessment in CM Navigator will be completed.    Advance Directives:      Code Status: DNR-CCA   Patient's Primary Decision Maker is: (P) Named in Scanned ACP Document    Primary Decision Maker: Hansel Latif - Spouse - [phone]    Secondary Decision Maker: andrecarmen - Child - [phone]    Secondary Decision Maker: EdilmikeyNabil - Child - [phone]    Discharge Planning:    Patient lives with: (P) Other (Comment) (AL) Type of Home: (P) Assisted living ([facility])  Primary Care Giver: (P) Other (Comment) (Memory Care at [facility])  Patient Support Systems include: (P) Spouse/Significant Other, Family Members   Current Financial resources: (P) Medicare  Current community resources: (P) Assisted Living ([facility])  Current services prior to admission: (P) Durable Medical Equipment            Current DME: (P) Wheelchair            Type of  members/significant others, and if so, who? Yes  (Spouse or children)   Financial Resources Medicare   Community Resources Assisted Living  ([facility])   CM/SW Referral Other (see comment)  (D/c planning needs)   Discharge Planning   Type of Residence Assisted living  ([facility])   Living Arrangements Other (Comment)  (AL)   Current Services Prior To Admission Durable Medical Equipment   Current DME Prior to Arrival Wheelchair   Potential Assistance Needed Skilled Nursing Facility   DME Ordered? No   Potential Assistance Purchasing Medications No   Type of Home Care Services None   Patient expects to be discharged to: Assisted living   History of falls? 0

## 2025-02-24 NOTE — PLAN OF CARE
Problem: Discharge Planning  Goal: Discharge to home or other facility with appropriate resources  2/24/2025 1004 by Donna Pringle, RN  Outcome: Progressing  Flowsheets (Taken 2/24/2025 0829)  Discharge to home or other facility with appropriate resources:   Identify barriers to discharge with patient and caregiver   Arrange for needed discharge resources and transportation as appropriate   Identify discharge learning needs (meds, wound care, etc)  2/23/2025 2209 by Feliz Conn RN  Outcome: Progressing  2/23/2025 2209 by Feliz Conn RN  Outcome: Progressing  2/23/2025 2207 by Feliz Conn RN  Outcome: Progressing  Flowsheets (Taken 2/23/2025 2039)  Discharge to home or other facility with appropriate resources:   Identify barriers to discharge with patient and caregiver   Arrange for needed discharge resources and transportation as appropriate   Identify discharge learning needs (meds, wound care, etc)     Problem: Skin/Tissue Integrity  Goal: Skin integrity remains intact  Description: 1.  Monitor for areas of redness and/or skin breakdown  2.  Assess vascular access sites hourly  3.  Every 4-6 hours minimum:  Change oxygen saturation probe site  4.  Every 4-6 hours:  If on nasal continuous positive airway pressure, respiratory therapy assess nares and determine need for appliance change or resting period  2/24/2025 1004 by Donna Pringle, RN  Outcome: Progressing  Flowsheets (Taken 2/24/2025 0829)  Skin Integrity Remains Intact: Monitor for areas of redness and/or skin breakdown  2/23/2025 2209 by Feliz Conn RN  Outcome: Progressing  2/23/2025 2209 by Feliz Conn RN  Outcome: Progressing  2/23/2025 2207 by Feliz Conn RN  Outcome: Progressing  Flowsheets (Taken 2/23/2025 2039)  Skin Integrity Remains Intact: Monitor for areas of redness and/or skin breakdown     Problem: Safety - Adult  Goal: Free from fall injury  2/24/2025 1004 by Donna Pringle, RN  Outcome:

## 2025-02-24 NOTE — PROGRESS NOTES
Upon admission, Son's Alejandra said pt is a DNR. Full code noted in the system. Facility called and faxed DNR paper work to [facility]. This RN placed DNR paper work in chart. Night shift RN made aware and states she will message doctor to have changed. Night shift Charge RN made aware.

## 2025-02-24 NOTE — PROGRESS NOTES
[place]2.0  [facility] [place]      Name:  Molly Latif /Age/Sex: 1933  (89 y.o. female)   MRN & CSN:  [id number] & [id number] Encounter Date/Time: 2025 1:30 PM EST    Location:  [id number] PCP: Elinor Hurst DO       Hospital Day: 2  Subjective:   Chief Complaint: Follow-up swelling of the left side of the face    Patient seen and evaluated the bedside, patient's son at the bedside, patient still having drooling from the right side of her face, having difficulty with mastication,/oral intake has been poor    Review of Systems:    Review of Systems    10 point ROS negative except as stated above in \"subjective\" section    Objective:     Intake/Output Summary (Last 24 hours) at 2025 1330  Last data filed at 2025 1017  Gross per 24 hour   Intake 839.34 ml   Output --   Net 839.34 ml      Vitals:   Vitals:    25 1143   BP: 125/77   Pulse: 93   Resp: 15   Temp: 98 °F (36.7 °C)   SpO2: 94%     Physical Exam:   General: Awake, alert and oriented, NAD  Cardiovascular: S1S2 present, regular rate and rhythm, no murmurs  Respiratory: Clear to auscultation  Gastrointestinal: Soft, non tender, positive bowel sounds   Genitourinary: no suprapubic tenderness  General: The right side, some fullness noted, tender to touch, unable to open her mouth wide because of pain    Medications:     Medications:    sodium chloride flush  5-40 mL IntraVENous 2 times per day    enoxaparin  40 mg SubCUTAneous Nightly    ampicillin-sulbactam  3,000 mg IntraVENous Q6H    amLODIPine  5 mg Oral Nightly    triamterene-hydroCHLOROthiazide  1 tablet Oral Daily      Infusions:    sodium chloride      sodium chloride 100 mL/hr at 25 0319     PRN Meds: sodium chloride flush, 5-40 mL, PRN  sodium chloride, , PRN  potassium chloride, 40 mEq, PRN   Or  potassium alternative oral replacement, 40 mEq, PRN   Or  potassium chloride, 10 mEq, PRN  magnesium sulfate, 2,000 mg, PRN  ondansetron, 4 mg, Q8H PRN    Otherwise, no acute cardiopulmonary process       Assessment and Plan:   Molly Latif is a 89 y.o. female     Conditions under treatment:    # Myositis, left-sided  muscles  # 1 cm abscess involving the left medial pterygoid muscle  # Sialadenitis  # Dementia  # Clinical dehydration  # Essential hypertension  # Constipation    Plan:    -Patient with diminished oral intake assess clinical dehydration, continue with IV fluids  -Broad-based inflammation as well as abscess noted of the left-sided  muscles, ENT has been consulted, currently on empiric antibiotics, pain management as needed  -Patient has dementia, lives with son, currently without any behavioral issues  -Continue home medications for hypertension    Personally reviewed Lab Studies and Imaging     Telemetry strip reviewed and interpreted personally and results sinus rhythm    Imaging that was interpreted personally includes see and results inflammation of the masticatory muscles    Electronically signed by Paco Lamas MD on 2/24/2025 at 1:30 PM    This not was generated completely or in part using Dragon, speech recognition software, please excuse any inaccuracies or misstatements.

## 2025-02-24 NOTE — PROGRESS NOTES
Pt's O2 saturation on room air has been dropping in 80's. O2 was placed on Pt at 1-2L per NC. Pt is confused and continues to take oxygen off. O2 saturation recheck was 82% on the room air. O2 was placed back on the Pt . A verbal discussion was held with the Pt regarding the rationale for the oxygen but patient has Dementia. Will continue to monitor.

## 2025-02-24 NOTE — PLAN OF CARE
Problem: Discharge Planning  Goal: Discharge to home or other facility with appropriate resources  2/23/2025 2209 by Feliz Conn RN  Outcome: Progressing  2/23/2025 2209 by Feliz Conn RN  Outcome: Progressing  2/23/2025 2207 by Feliz Conn RN  Outcome: Progressing  Flowsheets (Taken 2/23/2025 2039)  Discharge to home or other facility with appropriate resources:   Identify barriers to discharge with patient and caregiver   Arrange for needed discharge resources and transportation as appropriate   Identify discharge learning needs (meds, wound care, etc)     Problem: Skin/Tissue Integrity  Goal: Skin integrity remains intact  Description: 1.  Monitor for areas of redness and/or skin breakdown  2.  Assess vascular access sites hourly  3.  Every 4-6 hours minimum:  Change oxygen saturation probe site  4.  Every 4-6 hours:  If on nasal continuous positive airway pressure, respiratory therapy assess nares and determine need for appliance change or resting period  2/23/2025 2209 by Feliz Conn RN  Outcome: Progressing  2/23/2025 2209 by Feliz Conn RN  Outcome: Progressing  2/23/2025 2207 by Feliz Conn RN  Outcome: Progressing  Flowsheets (Taken 2/23/2025 2039)  Skin Integrity Remains Intact: Monitor for areas of redness and/or skin breakdown     Problem: Safety - Adult  Goal: Free from fall injury  2/23/2025 2209 by Feliz Conn RN  Outcome: Progressing  2/23/2025 2209 by Feliz Conn RN  Outcome: Progressing     Problem: ABCDS Injury Assessment  Goal: Absence of physical injury  2/23/2025 2209 by Feliz Conn RN  Outcome: Progressing  2/23/2025 2209 by Feliz Conn RN  Outcome: Progressing     Problem: Pain  Goal: Verbalizes/displays adequate comfort level or baseline comfort level  2/23/2025 2209 by Feliz Conn RN  Outcome: Progressing  2/23/2025 2209 by Feliz Conn RN  Outcome: Progressing  Flowsheets (Taken 2/23/2025 2039)  Verbalizes/displays  adequate comfort level or baseline comfort level:   Encourage patient to monitor pain and request assistance   Assess pain using appropriate pain scale   Administer analgesics based on type and severity of pain and evaluate response   Implement non-pharmacological measures as appropriate and evaluate response   Consider cultural and social influences on pain and pain management

## 2025-02-24 NOTE — PROGRESS NOTES
Code status updated to DNR-CCA following my verification with the [facility] paper work.

## 2025-02-24 NOTE — CONSULTS
[facility]  DIVISION OF OTOLARYNGOLOGY- HEAD & NECK SURGERY  CONSULT      Patient Name: Molly Latif  Medical Record Number:  [id number]  Primary Care Physician:  Elinor Hurst DO  Date of Consultation: 2/24/2025    Chief Complaint: Left facial pain and swelling        HISTORY OF PRESENT ILLNESS  Molly is a(n) 89 y.o. female who presented to [facility] on 2/23/2025 for multiple complaints, being seen in ENT consultation for left facial swelling.  She resides at a memory care unit.  Her son brought her in for swelling of the left side of her face and cheek, reluctance to open her mouth, and poor appetite.  History obtained mostly from her son at bedside   No leukocytosis.  She is on Unasyn.           Patient Active Problem List   Diagnosis    Rectal prolapse    Acute metabolic encephalopathy    Sialoadenitis     Past Surgical History:   Procedure Laterality Date    CHOLECYSTECTOMY      HERNIA REPAIR      hiatal hernia    HYSTERECTOMY, TOTAL ABDOMINAL (CERVIX REMOVED)      complete    RECTAL PROLAPSE REPAIR N/A 2/18/2022    TRANSPERINEAL RECTOSIGMOIDECTOMY (ALTEMEIER PROCEDURE), FLEXIBLE SIGMOIDOSCOPY performed by Tre Wright MD at [facility] OR     Family History   Problem Relation Age of Onset    Dementia Mother     Heart Disease Father     Depression Father      Social History     Socioeconomic History    Marital status:      Spouse name: Not on file    Number of children: Not on file    Years of education: Not on file    Highest education level: Not on file   Occupational History    Not on file   Tobacco Use    Smoking status: Never    Smokeless tobacco: Never   Vaping Use    Vaping status: Not on file   Substance and Sexual Activity    Alcohol use: Yes     Comment: socially--wine    Drug use: Never    Sexual activity: Not Currently   Other Topics Concern    Not on file   Social History Narrative    Not on file     Social Determinants of Health     Financial Resource Strain: Not on file   Food

## 2025-02-24 NOTE — ACP (ADVANCE CARE PLANNING)
Advance Care Planning   Healthcare Decision Maker:    Primary Decision Maker: Hansel Powell KIM - Spouse - [phone]    Secondary Decision Maker: carmen powell - Child - [phone]    Secondary Decision Maker: Nabil Powell - Child - [phone]      Today we documented Decision Maker(s) consistent with ACP documents on file.       VADNA Anderson [facility]  [phone]  Electronically signed by VANDA Walker on 2/24/2025 at 10:40 AM

## 2025-02-25 LAB
ANION GAP SERPL CALCULATED.3IONS-SCNC: 9 MMOL/L (ref 3–16)
BASOPHILS # BLD: 0 K/UL (ref 0–0.2)
BASOPHILS NFR BLD: 0.3 %
BUN SERPL-MCNC: 11 MG/DL (ref 7–20)
CALCIUM SERPL-MCNC: 8.8 MG/DL (ref 8.3–10.6)
CHLORIDE SERPL-SCNC: 101 MMOL/L (ref 99–110)
CO2 SERPL-SCNC: 26 MMOL/L (ref 21–32)
CREAT SERPL-MCNC: 0.6 MG/DL (ref 0.6–1.2)
DEPRECATED RDW RBC AUTO: 14.7 % (ref 12.4–15.4)
EOSINOPHIL # BLD: 0.2 K/UL (ref 0–0.6)
EOSINOPHIL NFR BLD: 2.6 %
GFR SERPLBLD CREATININE-BSD FMLA CKD-EPI: 84 ML/MIN/{1.73_M2}
GLUCOSE SERPL-MCNC: 113 MG/DL (ref 70–99)
HCT VFR BLD AUTO: 30.7 % (ref 36–48)
HGB BLD-MCNC: 10.5 G/DL (ref 12–16)
LYMPHOCYTES # BLD: 1 K/UL (ref 1–5.1)
LYMPHOCYTES NFR BLD: 11.3 %
MAGNESIUM SERPL-MCNC: 1.64 MG/DL (ref 1.8–2.4)
MCH RBC QN AUTO: 30 PG (ref 26–34)
MCHC RBC AUTO-ENTMCNC: 34.4 G/DL (ref 31–36)
MCV RBC AUTO: 87.2 FL (ref 80–100)
MONOCYTES # BLD: 0.7 K/UL (ref 0–1.3)
MONOCYTES NFR BLD: 8.2 %
NEUTROPHILS # BLD: 6.8 K/UL (ref 1.7–7.7)
NEUTROPHILS NFR BLD: 77.6 %
PLATELET # BLD AUTO: 262 K/UL (ref 135–450)
PMV BLD AUTO: 9.7 FL (ref 5–10.5)
POTASSIUM SERPL-SCNC: 3.4 MMOL/L (ref 3.5–5.1)
RBC # BLD AUTO: 3.52 M/UL (ref 4–5.2)
SODIUM SERPL-SCNC: 136 MMOL/L (ref 136–145)
WBC # BLD AUTO: 8.8 K/UL (ref 4–11)

## 2025-02-25 PROCEDURE — 80048 BASIC METABOLIC PNL TOTAL CA: CPT

## 2025-02-25 PROCEDURE — 6360000002 HC RX W HCPCS

## 2025-02-25 PROCEDURE — 97166 OT EVAL MOD COMPLEX 45 MIN: CPT

## 2025-02-25 PROCEDURE — 6360000002 HC RX W HCPCS: Performed by: HOSPITALIST

## 2025-02-25 PROCEDURE — 97530 THERAPEUTIC ACTIVITIES: CPT

## 2025-02-25 PROCEDURE — 85025 COMPLETE CBC W/AUTO DIFF WBC: CPT

## 2025-02-25 PROCEDURE — 1200000000 HC SEMI PRIVATE

## 2025-02-25 PROCEDURE — 83735 ASSAY OF MAGNESIUM: CPT

## 2025-02-25 PROCEDURE — 94760 N-INVAS EAR/PLS OXIMETRY 1: CPT

## 2025-02-25 PROCEDURE — 6370000000 HC RX 637 (ALT 250 FOR IP)

## 2025-02-25 PROCEDURE — 2580000003 HC RX 258

## 2025-02-25 PROCEDURE — 36415 COLL VENOUS BLD VENIPUNCTURE: CPT

## 2025-02-25 PROCEDURE — 97535 SELF CARE MNGMENT TRAINING: CPT

## 2025-02-25 PROCEDURE — 97161 PT EVAL LOW COMPLEX 20 MIN: CPT

## 2025-02-25 RX ORDER — POTASSIUM CHLORIDE 7.45 MG/ML
10 INJECTION INTRAVENOUS
Status: COMPLETED | OUTPATIENT
Start: 2025-02-25 | End: 2025-02-25

## 2025-02-25 RX ORDER — POTASSIUM CHLORIDE 7.45 MG/ML
10 INJECTION INTRAVENOUS
Status: DISPENSED | OUTPATIENT
Start: 2025-02-25 | End: 2025-02-25

## 2025-02-25 RX ORDER — MAGNESIUM SULFATE 1 G/100ML
1000 INJECTION INTRAVENOUS ONCE
Status: COMPLETED | OUTPATIENT
Start: 2025-02-25 | End: 2025-02-25

## 2025-02-25 RX ADMIN — SODIUM CHLORIDE: 9 INJECTION, SOLUTION INTRAVENOUS at 02:15

## 2025-02-25 RX ADMIN — SODIUM CHLORIDE 3000 MG: 900 INJECTION INTRAVENOUS at 09:30

## 2025-02-25 RX ADMIN — SODIUM CHLORIDE 3000 MG: 900 INJECTION INTRAVENOUS at 21:45

## 2025-02-25 RX ADMIN — POTASSIUM CHLORIDE 10 MEQ: 7.46 INJECTION, SOLUTION INTRAVENOUS at 12:10

## 2025-02-25 RX ADMIN — SODIUM CHLORIDE 3000 MG: 900 INJECTION INTRAVENOUS at 02:19

## 2025-02-25 RX ADMIN — AMLODIPINE BESYLATE 5 MG: 5 TABLET ORAL at 21:40

## 2025-02-25 RX ADMIN — MAGNESIUM SULFATE HEPTAHYDRATE 1000 MG: 1 INJECTION, SOLUTION INTRAVENOUS at 11:10

## 2025-02-25 RX ADMIN — ONDANSETRON 4 MG: 2 INJECTION, SOLUTION INTRAMUSCULAR; INTRAVENOUS at 11:59

## 2025-02-25 RX ADMIN — ENOXAPARIN SODIUM 40 MG: 100 INJECTION SUBCUTANEOUS at 21:40

## 2025-02-25 RX ADMIN — POTASSIUM CHLORIDE 10 MEQ: 7.46 INJECTION, SOLUTION INTRAVENOUS at 13:35

## 2025-02-25 RX ADMIN — ONDANSETRON 4 MG: 2 INJECTION, SOLUTION INTRAMUSCULAR; INTRAVENOUS at 22:31

## 2025-02-25 RX ADMIN — ACETAMINOPHEN 650 MG: 325 TABLET ORAL at 22:31

## 2025-02-25 RX ADMIN — SODIUM CHLORIDE 3000 MG: 900 INJECTION INTRAVENOUS at 16:13

## 2025-02-25 RX ADMIN — TRIAMTERENE AND HYDROCHLOROTHIAZIDE 1 TABLET: 25; 37.5 TABLET ORAL at 09:06

## 2025-02-25 ASSESSMENT — PAIN DESCRIPTION - LOCATION: LOCATION: BACK

## 2025-02-25 ASSESSMENT — PAIN DESCRIPTION - DESCRIPTORS: DESCRIPTORS: ACHING

## 2025-02-25 NOTE — PROGRESS NOTES
Physical Therapy  Facility/Department: [address] MED SURG  Physical Therapy Initial and Discharge Assessment    Name: Molly Latif  : 1933  MRN: [id number]  Date of Service: 2025    Discharge Recommendations:  Long Term Care with PT, Long Term Care without PT   Molly Latif scored a 10/24 on the AM-PAC short mobility form.  At this time, further PT upon discharge will be left up to the discretion of the facility.  Recommend patient returns to prior setting with prior services.     PT Equipment Recommendations  Equipment Needed: No      Patient Diagnosis(es): The primary encounter diagnosis was Poor fluid intake. Diagnoses of Salivary gland abscess and Sialoadenitis were also pertinent to this visit.  Past Medical History:  has a past medical history of Arthritis, Forgetfulness, Hx of blood clots, Hyperlipidemia, Hypertension, and Wears glasses.  Past Surgical History:  has a past surgical history that includes Cholecystectomy; Hysterectomy, total abdominal; hernia repair; and Rectal prolapse repair (N/A, 2022).    Assessment  Assessment: The pt is a 89 yo female who presented to the ED with L sided facial swelling. ENT consulted and feel the pt has: \"sialadenitis secondary to poor p.o. intake and dehydration\". PMHx: Rectal prolapse; Acute metabolic encephalopathy, Sialoadenitis, acute metabolic encephalopathy, hernia repair, rectal prolapse repair, dementia   The pt is currently from A-living,memory care unit at [facility]; per son's report the pt is no longer ambulatory and it takes 2 people to complete a pivot transfer bed <> w/c and w/c <> commode; the pt is dependent for self-care but can feed herself and complete some grooming with set-up. The pt's son reports she can move herself in the w/c using her legs but anticipate that she gets asisst for most of her w/c mobility. The pt's son reports he is at the EC everyday for at least 11 hours each day. Today, the pt demonstrated that she is most  likely functioning close or at her baseline: she is requiring at least mod A for supine > sit; max A of 2 for pivot transfers and max A of 2 for sit <> stand and brief static standing. The pt is behavioral and screams with any mobility, mocks people when they give her instructions and did throw a tissue at therapist. Anticipate that the pt will return to her facility at d/c and will leave further skilled PT services up to the discretion of the facility. At this time, the pt does not have acute care skilled PT needs and nursing was instructed to use the maxi-lift to get the pt up to the chair if needed. Will sign off.  Decision Making: Low Complexity  Barriers to Learning: cog, hearing  No Skilled PT: No PT goals identified  Requires PT Follow-Up: No  Activity Tolerance  Activity Tolerance: Patient tolerated evaluation without incident    Plan  Physical Therapy Plan  General Plan: Discharge with evaluation only  Safety Devices  Type of Devices: Call light within reach, Chair alarm in place, Heels elevated for pressure relief, Patient at risk for falls, Left in chair, Nurse notified, Gait belt    Restrictions  Restrictions/Precautions  Restrictions/Precautions: Fall Risk  Position Activity Restriction  Other Position/Activity Restrictions: 2 L O2; pure-wick     Subjective  General  Chart Reviewed: Yes  Additional Pertinent Hx: Per Albert Garcia MD H&P on 2-: The pt is a 89 yo female who presented to the ED with L sided facial swelling. ENT consulted and feel the pt has: \"sialadenitis secondary to poor p.o. intake and dehydration\".     PMHx: Rectal prolapse; Acute metabolic encephalopathy, Sialoadenitis, acute metabolic encephalopathy, hernia repair, rectal prolapse repair, dementia  Response To Previous Treatment: Not applicable  Family/Caregiver Present: Yes (son)  Referring Practitioner: Paco Lamas MD  Referral Date : 02/24/25  Diagnosis: sialadenitis  Follows Commands: Impaired  Subjective  Subjective:

## 2025-02-25 NOTE — PROGRESS NOTES
Occupational Therapy  Facility/Department: [address] MED SURG  Occupational Therapy Initial Assessment and Discharge    Name: Molly Latif  : 1933  MRN: [id number]  Date of Service: 2025    Discharge Recommendations:  Long Term Care without OT     Molly Latif scored a 12 on the -[facility] ADL Inpatient form.  At this time, no further OT is recommended upon discharge due to pt functioning at/near baseline.  Recommend patient returns to prior setting with prior services.            Patient Diagnosis(es): The primary encounter diagnosis was Poor fluid intake. Diagnoses of Salivary gland abscess and Sialoadenitis were also pertinent to this visit.  Past Medical History:  has a past medical history of Arthritis, Forgetfulness, Hx of blood clots, Hyperlipidemia, Hypertension, and Wears glasses.  Past Surgical History:  has a past surgical history that includes Cholecystectomy; Hysterectomy, total abdominal; hernia repair; and Rectal prolapse repair (N/A, 2022).           Assessment  Performance deficits / Impairments: Decreased functional mobility ;Decreased ADL status;Decreased ROM;Decreased strength;Decreased cognition;Decreased safe awareness;Decreased balance;Decreased endurance  Assessment: Pt is a 89 y.o. female presenting to [facility] with left facial pain and swelling. Pt admitted with sialadenitis secondary to poor p.o. intake and dehydration. At baseline, pt lives in [facility] A-living, memory impaired unit, has assist for majority of ADLs, non-ambulatory and requires assist x2 for pivot transfers to w/c. Pt currently functioning at/near baseline, today-pt persents confused, anxious, and very fearful of falling. Pt behavioral and screams/cries with all mobility. Pt required mod A x2 for bed mobility, max A x2 for pivot transfer bed>chair, and max A x2 for brief static standing balance at bedrail, unsteady with posterior lean. Pt for toileting hygiene/changing dpeends following

## 2025-02-25 NOTE — PROGRESS NOTES
Patient needing continuous reminder to not pull on tele leads and remove oxygen. Patient agitated, states she does not want the heart monitor. Patient able to be redirected and relaxed once heart monitor removed. Attending notified.

## 2025-02-25 NOTE — PROGRESS NOTES
V2.0  [facility] Hospitalist Progress Note      Name:  Molly Latif /Age/Sex: 1933  (89 y.o. female)   MRN & CSN:  [id number] & [id number] Encounter Date/Time: 2025 1:19 PM EST    Location:  [id number]/4109-01 PCP: Elinor Hurst DO       Hospital Day: 3  Subjective:   Chief Complaint: Follow-up facial swelling    Patient seen and evaluated the bedside, denies any complaints, seems to be doing better, feels better, able to open her mouth better, oral intake is marginally better    Review of Systems:    Review of Systems    10 point ROS negative except as stated above in \"subjective\" section    Objective:     Intake/Output Summary (Last 24 hours) at 2025 1319  Last data filed at 2025 0916  Gross per 24 hour   Intake 240 ml   Output 2400 ml   Net -2160 ml      Vitals:   Vitals:    25 0842   BP:    Pulse:    Resp:    Temp:    SpO2: 93%     Physical Exam:   General: Awake, alert and oriented, NAD  Face: Still with significant left facial swelling, able to open her mouth slightly more today  Cardiovascular: S1S2 present, regular rate and rhythm, no murmurs  Respiratory: Clear to auscultation  Gastrointestinal: Soft, non tender, positive bowel sounds   Genitourinary: no suprapubic tenderness  Musculoskeletal: No edema    Medications:     Medications:    potassium chloride  10 mEq IntraVENous Q1H    sodium chloride flush  5-40 mL IntraVENous 2 times per day    enoxaparin  40 mg SubCUTAneous Nightly    ampicillin-sulbactam  3,000 mg IntraVENous Q6H    amLODIPine  5 mg Oral Nightly    triamterene-hydroCHLOROthiazide  1 tablet Oral Daily      Infusions:    sodium chloride      sodium chloride 100 mL/hr at 25 0215     PRN Meds: sodium chloride flush, 5-40 mL, PRN  sodium chloride, , PRN  potassium chloride, 40 mEq, PRN   Or  potassium alternative oral replacement, 40 mEq, PRN   Or  potassium chloride, 10 mEq, PRN  magnesium sulfate, 2,000 mg, PRN  ondansetron, 4 mg, Q8H PRN   Or  ondansetron,  K/uL    Basophils Absolute 0.0 0.0 - 0.2 K/uL   Magnesium    Collection Time: 02/25/25  5:25 AM   Result Value Ref Range    Magnesium 1.64 (L) 1.80 - 2.40 mg/dL        Imaging/Diagnostics Last 24 Hours   CT SOFT TISSUE NECK W CONTRAST    Result Date: 2/23/2025  EXAMINATION: CT OF THE NECK SOFT TISSUE WITH CONTRAST  2/23/2025 TECHNIQUE: CT of the neck was performed with the administration of intravenous contrast. Multiplanar reformatted images are provided for review. Automated exposure control, iterative reconstruction, and/or weight based adjustment of the mA/kV was utilized to reduce the radiation dose to as low as reasonably achievable. COMPARISON: None. HISTORY: ORDERING SYSTEM PROVIDED HISTORY: dental abscess vs infected salivary gland, swelling ?mass of left cheek/jaw. hx dementia FINDINGS: PHARYNX/LARYNX:  The tonsillar pillars are normal in appearance.  The tongue is normal in appearance.  The valleculae, epiglottis, aryepiglottic folds and pyriform sinuses appear unremarkable.  The true and false vocal cords are normal in appearance.  Evidence of myositis of the left medial pterygoid, lateral pterygoid and overlying temporalis.  10 mm abscess involving the left medial pterygoid muscle adjacent to the inner cortex of the left mandible at the site of the mandibular foramen.  There is enlargement and likely intraglandular abscess of the minor salivary glands along the left floor of the mouth, limited evaluation due to extensive streak artifact from the dental amalgam. SALIVARY GLANDS/THYROID:  The left submandibular gland is inflamed.  The parotid and right submandibular glands appear unremarkable.  The thyroid gland appears unremarkable. LYMPH NODES:  No cervical or supraclavicular lymphadenopathy is seen. SOFT TISSUES:  Left lower face soft tissue swelling. BRAIN/ORBITS/SINUSES:  The visualized portion of the intracranial contents appear unremarkable.  The visualized portion of the orbits, paranasal sinuses

## 2025-02-25 NOTE — PLAN OF CARE
Problem: Discharge Planning  Goal: Discharge to home or other facility with appropriate resources  Outcome: Progressing     Problem: Skin/Tissue Integrity  Goal: Skin integrity remains intact  Description: 1.  Monitor for areas of redness and/or skin breakdown  2.  Assess vascular access sites hourly  3.  Every 4-6 hours minimum:  Change oxygen saturation probe site  4.  Every 4-6 hours:  If on nasal continuous positive airway pressure, respiratory therapy assess nares and determine need for appliance change or resting period  Outcome: Progressing     Problem: Safety - Adult  Goal: Free from fall injury  Outcome: Progressing     Problem: ABCDS Injury Assessment  Goal: Absence of physical injury  Outcome: Progressing     Problem: Pain  Goal: Verbalizes/displays adequate comfort level or baseline comfort level  Outcome: Progressing

## 2025-02-26 LAB
ANION GAP SERPL CALCULATED.3IONS-SCNC: 10 MMOL/L (ref 3–16)
BASOPHILS # BLD: 0 K/UL (ref 0–0.2)
BASOPHILS NFR BLD: 0.4 %
BUN SERPL-MCNC: 17 MG/DL (ref 7–20)
CALCIUM SERPL-MCNC: 9.2 MG/DL (ref 8.3–10.6)
CHLORIDE SERPL-SCNC: 102 MMOL/L (ref 99–110)
CO2 SERPL-SCNC: 26 MMOL/L (ref 21–32)
CREAT SERPL-MCNC: 0.6 MG/DL (ref 0.6–1.2)
DEPRECATED RDW RBC AUTO: 14.4 % (ref 12.4–15.4)
EOSINOPHIL # BLD: 0.5 K/UL (ref 0–0.6)
EOSINOPHIL NFR BLD: 7.4 %
GFR SERPLBLD CREATININE-BSD FMLA CKD-EPI: 84 ML/MIN/{1.73_M2}
GLUCOSE SERPL-MCNC: 117 MG/DL (ref 70–99)
HCT VFR BLD AUTO: 34.6 % (ref 36–48)
HGB BLD-MCNC: 11.7 G/DL (ref 12–16)
LYMPHOCYTES # BLD: 1.4 K/UL (ref 1–5.1)
LYMPHOCYTES NFR BLD: 21.9 %
MAGNESIUM SERPL-MCNC: 1.99 MG/DL (ref 1.8–2.4)
MCH RBC QN AUTO: 29.8 PG (ref 26–34)
MCHC RBC AUTO-ENTMCNC: 33.9 G/DL (ref 31–36)
MCV RBC AUTO: 87.7 FL (ref 80–100)
MONOCYTES # BLD: 0.6 K/UL (ref 0–1.3)
MONOCYTES NFR BLD: 9 %
NEUTROPHILS # BLD: 4 K/UL (ref 1.7–7.7)
NEUTROPHILS NFR BLD: 61.3 %
PLATELET # BLD AUTO: 282 K/UL (ref 135–450)
PMV BLD AUTO: 9.6 FL (ref 5–10.5)
POTASSIUM SERPL-SCNC: 3.8 MMOL/L (ref 3.5–5.1)
RBC # BLD AUTO: 3.95 M/UL (ref 4–5.2)
SODIUM SERPL-SCNC: 138 MMOL/L (ref 136–145)
WBC # BLD AUTO: 6.5 K/UL (ref 4–11)

## 2025-02-26 PROCEDURE — 6360000002 HC RX W HCPCS

## 2025-02-26 PROCEDURE — 36415 COLL VENOUS BLD VENIPUNCTURE: CPT

## 2025-02-26 PROCEDURE — 2580000003 HC RX 258

## 2025-02-26 PROCEDURE — 80048 BASIC METABOLIC PNL TOTAL CA: CPT

## 2025-02-26 PROCEDURE — 85025 COMPLETE CBC W/AUTO DIFF WBC: CPT

## 2025-02-26 PROCEDURE — 94760 N-INVAS EAR/PLS OXIMETRY 1: CPT

## 2025-02-26 PROCEDURE — 83735 ASSAY OF MAGNESIUM: CPT

## 2025-02-26 PROCEDURE — 1200000000 HC SEMI PRIVATE

## 2025-02-26 PROCEDURE — 99231 SBSQ HOSP IP/OBS SF/LOW 25: CPT | Performed by: OTOLARYNGOLOGY

## 2025-02-26 PROCEDURE — 6370000000 HC RX 637 (ALT 250 FOR IP)

## 2025-02-26 RX ADMIN — SODIUM CHLORIDE 3000 MG: 900 INJECTION INTRAVENOUS at 08:10

## 2025-02-26 RX ADMIN — SODIUM CHLORIDE 3000 MG: 900 INJECTION INTRAVENOUS at 04:01

## 2025-02-26 RX ADMIN — SODIUM CHLORIDE 3000 MG: 900 INJECTION INTRAVENOUS at 22:23

## 2025-02-26 RX ADMIN — ACETAMINOPHEN 650 MG: 325 TABLET ORAL at 22:53

## 2025-02-26 RX ADMIN — POLYETHYLENE GLYCOL 3350 17 G: 17 POWDER, FOR SOLUTION ORAL at 08:14

## 2025-02-26 RX ADMIN — ONDANSETRON 4 MG: 2 INJECTION, SOLUTION INTRAMUSCULAR; INTRAVENOUS at 17:46

## 2025-02-26 RX ADMIN — TRIAMTERENE AND HYDROCHLOROTHIAZIDE 1 TABLET: 25; 37.5 TABLET ORAL at 08:11

## 2025-02-26 RX ADMIN — AMLODIPINE BESYLATE 5 MG: 5 TABLET ORAL at 22:53

## 2025-02-26 RX ADMIN — ENOXAPARIN SODIUM 40 MG: 100 INJECTION SUBCUTANEOUS at 22:54

## 2025-02-26 RX ADMIN — SODIUM CHLORIDE 3000 MG: 900 INJECTION INTRAVENOUS at 15:55

## 2025-02-26 ASSESSMENT — PAIN SCALES - GENERAL: PAINLEVEL_OUTOF10: 0

## 2025-02-26 NOTE — CARE COORDINATION
LSW spoke with Belinda (GUERLINE at [facility]) who states that PTA patient could stand and pivot with one person assistance. Patient is currently requiring max assist of 2 people. PT and OT notes faxed to Belinda for her review. LSW awaiting response from [facility] regarding patient's return.   Electronically signed by BOOM Elliott on 2/26/2025 at 9:54 AM  [phone]

## 2025-02-26 NOTE — PLAN OF CARE
Problem: Discharge Planning  Goal: Discharge to home or other facility with appropriate resources  Outcome: Progressing     Problem: Skin/Tissue Integrity  Goal: Skin integrity remains intact  Description: 1.  Monitor for areas of redness and/or skin breakdown  2.  Assess vascular access sites hourly  3.  Every 4-6 hours minimum:  Change oxygen saturation probe site  4.  Every 4-6 hours:  If on nasal continuous positive airway pressure, respiratory therapy assess nares and determine need for appliance change or resting period  Outcome: Progressing  Flowsheets (Taken 2/25/2025 1138 by Xiao Montana RN)  Skin Integrity Remains Intact: Monitor for areas of redness and/or skin breakdown     Problem: Safety - Adult  Goal: Free from fall injury  Outcome: Progressing     Problem: ABCDS Injury Assessment  Goal: Absence of physical injury  Outcome: Progressing     Problem: Pain  Goal: Verbalizes/displays adequate comfort level or baseline comfort level  Outcome: Progressing

## 2025-02-26 NOTE — PROGRESS NOTES
[facility]  HEAD AND NECK - ENT  PROGRESS  NOTE    Date of Service: 2025    ASSESSMENT: Molly Latif is a 89 y.o. female  with sialadenitis secondary to poor p.o. intake and dehydration.      PLAN/RECOMMENDATIONS:   1.  Continue antibiotics, sialagogues.  Encourage oral hydration.   2.   Follow-up with dentist for possible odontogenic infection.  There is no abscess that needs drained at this time.   Okay to discharge from ENT standpoint.  Recommend 10 days total of antibiotics.    The patient was discussed with the ENT attending, who agreed with the above findings and plan.    SUBJECTIVE:   No significant overnight events.  Her son is at bedside.  She is more awake and alert today, answering questions appropriately.  He reports she is doing better with p.o. intake, has not tried solid food yet however.  She just ordered her first try of solid food.  Molly or her caregiver reports have no complaints.  Her pain has improved.    OBJECTIVE:  Physical Exam:   Temp (24hrs), Av.2 °F (36.8 °C), Min:97.6 °F (36.4 °C), Max:98.7 °F (37.1 °C)    Vitals:    25 2140 25 0625 25 0754 25 0816   BP: (!) 145/79  (!) 152/78    Pulse:   81    Resp:   18    Temp:   97.6 °F (36.4 °C)    TempSrc:   Oral    SpO2:   95% 94%   Weight:  65.5 kg (144 lb 8 oz)     Height:         I/O last 3 completed shifts:  In: 480 [P.O.:480]  Out: 4400 [Urine:4400]    GENERAL: No Acute Distress, Alert and Oriented, no hoarseness  EYES: EOMI, Anti-icteric  NOSE: No epistaxis, nasal mucosa within normal limits, no purulent drainage  EARS: Normal external canal appearance, EAC patent bilaterally  FACE: 1/6 House-Brackmann Scale, symmetric, sensation equal bilaterally  ORAL CAVITY: No masses or lesions palpated, uvula is midline, moist mucous membranes, 1+ tonsils, good dentition  NECK: Normal range of motion, no thyromegaly, trachea is midline, no lymphadenopathy, no neck masses, no crepitus.  Slight fullness to the left  mandible and submandibular space.  No erythema, fluctuance, abscess.  CHEST: Normal respiratory effort, no retractions, breathing comfortably  SKIN: No rashes, normal appearing skin, no evidence of skin lesions/tumors      Lab Studies:  Lab Results   Component Value Date    WBC 6.5 02/26/2025    HGB 11.7 (L) 02/26/2025    HCT 34.6 (L) 02/26/2025    MCV 87.7 02/26/2025     02/26/2025     Lab Results   Component Value Date    GLUCOSE 117 (H) 02/26/2025    BUN 17 02/26/2025    CREATININE 0.6 02/26/2025    K 3.8 02/26/2025     02/26/2025     02/26/2025    CALCIUM 9.2 02/26/2025     Lab Results   Component Value Date    MG 1.99 02/26/2025     No results found for: \"PHOS\"  Lab Results   Component Value Date    ALKPHOS 49 10/17/2023    ALT 10 10/17/2023    AST 11 (L) 10/17/2023    BILITOT 0.5 10/17/2023    ALBUMIN 3.2 (L) 10/17/2023       Perla Land, APRN - CNP

## 2025-02-26 NOTE — PROGRESS NOTES
muscles    Electronically signed by Paco Lamas MD on 2/26/2025 at 1:04 PM    This not was generated completely or in part using Dragon, speech recognition software, please excuse any inaccuracies or misstatements.

## 2025-02-26 NOTE — CARE COORDINATION
Call received from Belinda at [facility] requesting an in person evaluation when patient is working with therapy due to concerns that facility may no longer be able to accommodate patients needs. Will attempt to arrange with therapy on 2/27 if possible.   Electronically signed by BOOM Elliott on 2/26/2025 at 4:18 PM  [phone]

## 2025-02-27 PROCEDURE — 94760 N-INVAS EAR/PLS OXIMETRY 1: CPT

## 2025-02-27 PROCEDURE — 97530 THERAPEUTIC ACTIVITIES: CPT

## 2025-02-27 PROCEDURE — 6360000002 HC RX W HCPCS

## 2025-02-27 PROCEDURE — 2500000003 HC RX 250 WO HCPCS

## 2025-02-27 PROCEDURE — 6370000000 HC RX 637 (ALT 250 FOR IP)

## 2025-02-27 PROCEDURE — 1200000000 HC SEMI PRIVATE

## 2025-02-27 PROCEDURE — 97112 NEUROMUSCULAR REEDUCATION: CPT

## 2025-02-27 PROCEDURE — 6370000000 HC RX 637 (ALT 250 FOR IP): Performed by: HOSPITALIST

## 2025-02-27 PROCEDURE — 9990000010 HC NO CHARGE VISIT

## 2025-02-27 PROCEDURE — 2580000003 HC RX 258

## 2025-02-27 RX ADMIN — TRIAMTERENE AND HYDROCHLOROTHIAZIDE 1 TABLET: 25; 37.5 TABLET ORAL at 10:50

## 2025-02-27 RX ADMIN — SODIUM CHLORIDE 3000 MG: 900 INJECTION INTRAVENOUS at 04:26

## 2025-02-27 RX ADMIN — AMOXICILLIN AND CLAVULANATE POTASSIUM 1 TABLET: 875; 125 TABLET, FILM COATED ORAL at 21:14

## 2025-02-27 RX ADMIN — ENOXAPARIN SODIUM 40 MG: 100 INJECTION SUBCUTANEOUS at 21:14

## 2025-02-27 RX ADMIN — ONDANSETRON 4 MG: 4 TABLET, ORALLY DISINTEGRATING ORAL at 13:43

## 2025-02-27 RX ADMIN — SODIUM CHLORIDE 3000 MG: 900 INJECTION INTRAVENOUS at 10:55

## 2025-02-27 RX ADMIN — ONDANSETRON 4 MG: 2 INJECTION, SOLUTION INTRAMUSCULAR; INTRAVENOUS at 05:08

## 2025-02-27 RX ADMIN — AMLODIPINE BESYLATE 5 MG: 5 TABLET ORAL at 21:14

## 2025-02-27 RX ADMIN — ACETAMINOPHEN 650 MG: 325 TABLET ORAL at 04:39

## 2025-02-27 RX ADMIN — SODIUM CHLORIDE, PRESERVATIVE FREE 10 ML: 5 INJECTION INTRAVENOUS at 10:51

## 2025-02-27 ASSESSMENT — PAIN SCALES - GENERAL
PAINLEVEL_OUTOF10: 0
PAINLEVEL_OUTOF10: 3

## 2025-02-27 ASSESSMENT — PAIN DESCRIPTION - DESCRIPTORS: DESCRIPTORS: ACHING;DISCOMFORT

## 2025-02-27 ASSESSMENT — PAIN DESCRIPTION - ORIENTATION: ORIENTATION: RIGHT;LEFT

## 2025-02-27 ASSESSMENT — PAIN DESCRIPTION - LOCATION: LOCATION: GENERALIZED

## 2025-02-27 ASSESSMENT — PAIN - FUNCTIONAL ASSESSMENT: PAIN_FUNCTIONAL_ASSESSMENT: ACTIVITIES ARE NOT PREVENTED

## 2025-02-27 NOTE — PROGRESS NOTES
Physical Therapy  Facility/Department: [address] MED SURG  Physical Therapy Co-treatment    Name: Molly Latif  : 1933  MRN: [id number]  Date of Service: 2025    Discharge Recommendations:  Long Term Care with PT   PT Equipment Recommendations  Equipment Needed: No      Patient Diagnosis(es): The primary encounter diagnosis was Poor fluid intake. Diagnoses of Salivary gland abscess and Sialoadenitis were also pertinent to this visit.  Past Medical History:  has a past medical history of Arthritis, Forgetfulness, Hx of blood clots, Hyperlipidemia, Hypertension, and Wears glasses.  Past Surgical History:  has a past surgical history that includes Cholecystectomy; Hysterectomy, total abdominal; hernia repair; and Rectal prolapse repair (N/A, 2022).    Assessment  Assessment: The pt is a 89 yo female who presented to the ED with L sided facial swelling. ENT consulted and feel the pt has: \"sialadenitis secondary to poor p.o. intake and dehydration\". PMHx: Rectal prolapse; Acute metabolic encephalopathy, Sialoadenitis, acute metabolic encephalopathy, hernia repair, rectal prolapse repair, dementia   The pt is currently from assisted living,memory care unit at [facility]; per son's report the pt is no longer ambulatory and it takes 2 people to complete a pivot transfer bed <> w/c and w/c <> commode. Today, the pt. was able to stand up to Aline Stedy with 2 person assist. She was limited by fear of falling and decreased cognition/command following, and could not follow commands for practicing sit to/from stand from recliner to attempt without use of Aline Stedy. Pt. could benefit from continued therapy upon D/C to improve strength and transfers.  Treatment Diagnosis: impaired mobility  Barriers to Learning: cognition  Requires PT Follow-Up: Yes  Activity Tolerance  Activity Tolerance: Treatment limited secondary to decreased cognition  Activity Tolerance Comments: fear of falling    Plan  Physical Therapy   (staff or son assist with w/c mobility, pt can peddle some with her feet)  Prior Level of Assist for Transfers: Needs assistance (pivot transfer to w/c with assist x2)  Additional Comments: Son visits pt everyday and is present for ~11 hours/day    Vision/Hearing  Vision  Vision: Within Functional Limits  Hearing  Hearing: Exceptions to WFL  Hearing Exceptions: Hard of hearing/hearing concerns      Cognition   Orientation  Overall Orientation Status: Impaired  Orientation Level: Oriented to person;Disoriented to time;Disoriented to situation;Disoriented to place  Cognition  Overall Cognitive Status: Exceptions  Following Commands: Follows one step commands with repetition;Follows one step commands with increased time  Attention Span: Attends with cues to redirect;Difficulty attending to directions  Memory: Decreased recall of biographical Information;Decreased recall of precautions;Decreased short term memory;Decreased recall of recent events;Decreased long term memory  Safety Judgement: Decreased awareness of need for safety  Problem Solving: Decreased awareness of errors;Assistance required to identify errors made;Assistance required to generate solutions;Assistance required to implement solutions;Assistance required to correct errors made  Insights: Not aware of deficits  Initiation: Requires cues for all  Sequencing: Requires cues for all  Cognition Comment: pt is very anxious/fearful of falling    Objective     Gross Assessment  AROM: Generally decreased, functional  Strength:  (able to assist with moving LE's to EOB but functionally weak)        Bed mobility  Supine to Sit: Partial/Moderate assistance;2 Person assistance (HOB elevated; use of bedrail; inc time and effort to complete)  Sit to Supine: Unable to assess (the pt up to the chair at end of session)  Scooting: Substantial/Maximal assistance  Transfers  Sit to Stand: Substantial/Maximal assistance;2 Person Assistance (to Aline Han)  Stand to Sit:

## 2025-02-27 NOTE — PROGRESS NOTES
the shelter, is requesting in person evaluation while therapy is working with her, case management looking into coordinating that     -Continue home medications for hypertension     Personally reviewed Lab Studies and Imaging        Electronically signed by Paco Lamas MD on 2/27/2025 at 11:13 AM    This not was generated completely or in part using Dragon, speech recognition software, please excuse any inaccuracies or misstatements.

## 2025-02-27 NOTE — PROGRESS NOTES
Physical Therapy      Molly Latif  2/27/2025    -received call from [facility] to confirm recommendations for transfers  -reviewed chart..  -it was recommended by PT and OT that nursing use the Maxi-move for transfers to and from bed  -she required maximum assist of 2 persons for stand pivot transfers- not safe for nursing staff to manage this     - note reviewed     -if there needs to be an observation of patient's facility staff to determine if they can continue to provide her care at this increased level will need  to contact PTOT to arrange    - at this time recommend use of the Maxi-move for transfers    Electronically signed by JAYANT CORADO, PT on 2/27/2025 at 12:37 PM

## 2025-02-27 NOTE — CARE COORDINATION
Discharge Planning:  DOYLE contacted [facility] [phone] and spoke with Belinda.  Belinda explained that this facility is only able to accept this pt back if pt is able to stand to pivot and transfer with one person as this is her baseline.  Pts son does not want this pt to go to a SNF and would like this pt to return to her assisted living.  SW spoke with therapy who is agreeable to re eval this pt today.   SW met with pts son and spoke with pts dgtr in law via phone.  Pts son and dgtr in law stated pts AL monthly bill was increased by $1000.00 d/t her being an assist of 2.    Pts dgtr in law stated she will call [facility] and will talk with Belinda.  In the meantime, PT/OT will again work with pt.    Family is aware.  DOYLE will continue to follow.   VANDA Anderson  [phone]  Electronically signed by VANDA Walker on 2/27/2025 at 2:08 PM

## 2025-02-27 NOTE — PROGRESS NOTES
Occupational Therapy  Facility/Department: [address] MED SURG  Occupational Therapy Re-evaluation/Daily Treatment Note    Name: Molly Latif  : 1933  MRN: [id number]  Date of Service: 2025    Discharge Recommendations:  Continue to assess pending progress     Molly Latif scored a  on the -[facility] ADL Inpatient form. Please see assessment section for further patient specific details.    If patient discharges prior to next session this note will serve as a discharge summary.  Please see below for the latest assessment towards goals.       Patient Diagnosis(es): The primary encounter diagnosis was Poor fluid intake. Diagnoses of Salivary gland abscess and Sialoadenitis were also pertinent to this visit.  Past Medical History:  has a past medical history of Arthritis, Forgetfulness, Hx of blood clots, Hyperlipidemia, Hypertension, and Wears glasses.  Past Surgical History:  has a past surgical history that includes Cholecystectomy; Hysterectomy, total abdominal; hernia repair; and Rectal prolapse repair (N/A, 2022).    Assessment  Performance deficits / Impairments: Decreased functional mobility ;Decreased ADL status;Decreased ROM;Decreased strength;Decreased cognition;Decreased safe awareness;Decreased balance;Decreased endurance  Assessment: Pt is a 89 y.o. female presenting to [facility] with left facial pain and swelling. Pt admitted with sialadenitis secondary to poor p.o. intake and dehydration. At baseline, pt lives in [facility] A-living, memory impaired unit, has assist for majority of ADLs, non-ambulatory and requires assist x2 for pivot transfers to w/c. Pt currently functioning below her baseline, today-pt anxious and very fearful of falling. Pt screams/cries with all attempts for OOB activity. Pt required mod A x2 for bed mobility, max A x2 for fxl transfers to/from STEDY and max A x2 for brief static standing balance in STEDY. totalA fxl mobility via STEDY from bed>recliner this  chair but unable to complete 2/2 fear of falling and feet sliding out from underneath pt (unsafe to attempt at this time).  Vision  Vision: Within Functional Limits  Hearing  Hearing: Exceptions to WFL  Hearing Exceptions: Hard of hearing/hearing concerns  Cognition  Overall Cognitive Status: Exceptions  Following Commands: Follows one step commands with repetition;Follows one step commands with increased time  Attention Span: Attends with cues to redirect;Difficulty attending to directions  Memory: Decreased recall of biographical Information;Decreased recall of precautions;Decreased short term memory;Decreased recall of recent events;Decreased long term memory  Safety Judgement: Decreased awareness of need for safety  Problem Solving: Decreased awareness of errors;Assistance required to identify errors made;Assistance required to generate solutions;Assistance required to implement solutions;Assistance required to correct errors made  Insights: Not aware of deficits  Initiation: Requires cues for all  Sequencing: Requires cues for all  Cognition Comment: pt is very anxious/fearful of falling  Orientation  Overall Orientation Status: Impaired  Orientation Level: Oriented to person;Disoriented to time;Disoriented to situation;Disoriented to place               Static Sitting Balance Exercises: pt with variable assist from mod/max A to CGA (with hands off approach) seated EOB and in STEDY 2/2 L lean noted holding onto bedrail and STEDY bar for support, pt very fearful of falling throughout  Static Standing Balance Exercises: maxA x2 briefly to stand at STEDY during fxl transfers  Education Given To: Patient  Education Provided: Role of Therapy;Transfer Training;Orientation  Education Method: Verbal;Demonstration  Barriers to Learning: Cognition;Hearing  Education Outcome: Unable to demonstrate understanding                 AM-PAC - ADL  AM-PAC Daily Activity - Inpatient   How much help is needed for putting on and

## 2025-02-28 PROCEDURE — 6370000000 HC RX 637 (ALT 250 FOR IP)

## 2025-02-28 PROCEDURE — 6360000002 HC RX W HCPCS

## 2025-02-28 PROCEDURE — 1200000000 HC SEMI PRIVATE

## 2025-02-28 PROCEDURE — 6370000000 HC RX 637 (ALT 250 FOR IP): Performed by: HOSPITALIST

## 2025-02-28 PROCEDURE — 94760 N-INVAS EAR/PLS OXIMETRY 1: CPT

## 2025-02-28 RX ADMIN — TRIAMTERENE AND HYDROCHLOROTHIAZIDE 1 TABLET: 25; 37.5 TABLET ORAL at 08:23

## 2025-02-28 RX ADMIN — AMOXICILLIN AND CLAVULANATE POTASSIUM 1 TABLET: 875; 125 TABLET, FILM COATED ORAL at 21:10

## 2025-02-28 RX ADMIN — ACETAMINOPHEN 650 MG: 325 TABLET ORAL at 02:27

## 2025-02-28 RX ADMIN — ACETAMINOPHEN 650 MG: 325 TABLET ORAL at 21:10

## 2025-02-28 RX ADMIN — AMLODIPINE BESYLATE 5 MG: 5 TABLET ORAL at 21:10

## 2025-02-28 RX ADMIN — ONDANSETRON 4 MG: 4 TABLET, ORALLY DISINTEGRATING ORAL at 02:27

## 2025-02-28 RX ADMIN — ONDANSETRON 4 MG: 4 TABLET, ORALLY DISINTEGRATING ORAL at 21:10

## 2025-02-28 RX ADMIN — ACETAMINOPHEN 650 MG: 325 TABLET ORAL at 14:38

## 2025-02-28 RX ADMIN — AMOXICILLIN AND CLAVULANATE POTASSIUM 1 TABLET: 875; 125 TABLET, FILM COATED ORAL at 08:23

## 2025-02-28 RX ADMIN — ENOXAPARIN SODIUM 40 MG: 100 INJECTION SUBCUTANEOUS at 21:10

## 2025-02-28 ASSESSMENT — PAIN DESCRIPTION - LOCATION: LOCATION: BACK

## 2025-02-28 ASSESSMENT — PAIN DESCRIPTION - DESCRIPTORS: DESCRIPTORS: ACHING

## 2025-02-28 ASSESSMENT — PAIN SCALES - GENERAL
PAINLEVEL_OUTOF10: 0
PAINLEVEL_OUTOF10: 5

## 2025-02-28 ASSESSMENT — PAIN SCALES - WONG BAKER: WONGBAKER_NUMERICALRESPONSE: NO HURT

## 2025-02-28 NOTE — CARE COORDINATION
Discharge Planning:  DOYLE contacted [facility] [phone] to talk with Belinda.  DOYLE was informed that Belinda is currently with a resident and will call this SW back shortly.   VANDA Anderson [facility]  [phone]  Electronically signed by VANDA Walker on 2/28/2025 at 9:45 AM    Addendum:  DOYLE spoke with pts dgtr in [facility].  Nola stated she tried to get in touch with Belinda but did not receive a call back.  Belinda stated she continues to get the \"run around\" about pts needs.  DOYLE requested to send updated PT/OT notes to Belinda so that [facility] can make a decision regarding the staff ability to care for pt.  DOYLE asked Belinda to please f/u with pts family after they have made a decision.  Belinda stated because the patient is in the hospital. She does not need to call the family.  DOYLE again requested that Belinda review the therapy notes and then f/u with family re: the staffs ability to care for pt.  DOYLE explained that the role of this SW is to ensure pt has a safe d/c but this SW is unable to move forward with SNF/LTC placement until [facility] makes a decision about pt being able to return. Belinda agreed to review the therapy notes.  PT/OT notes faxed to Belinda at [phone].  Will await a response.   VANDA Anderson [facility]  [phone]  Electronically signed by VANDA Walker on 2/28/2025 at 12:09 PM    Addendum:  DOYLE spoke with pts dgtr in [facility].  Nola is aware that there is a chance that this pt will not be able to return to [facility].  SNF/ECF list e mailed to Nola at [email or web address] for review.  Nola stated she will also try to speak with the DONBelinda at [facility].   VANDA Anderson [facility]  [phone]  Electronically signed by VANDA Walker on 2/28/2025 at 12:21 PM    Addendum:  DOYLE received a call from pts dgtr in [facility].  Nola stated she has talked with Otoniel Foy at [facility] and was informed that this facility is willing to

## 2025-02-28 NOTE — PLAN OF CARE
Problem: Skin/Tissue Integrity  Goal: Skin integrity remains intact  Description: 1.  Monitor for areas of redness and/or skin breakdown  2.  Assess vascular access sites hourly  3.  Every 4-6 hours minimum:  Change oxygen saturation probe site  4.  Every 4-6 hours:  If on nasal continuous positive airway pressure, respiratory therapy assess nares and determine need for appliance change or resting period  2/27/2025 1825 by Baylee Pollard, RN  Outcome: Progressing     Problem: Safety - Adult  Goal: Free from fall injury  2/27/2025 2214 by Desirae Otto, RN  Flowsheets (Taken 2/27/2025 2214)  Free From Fall Injury:   Instruct family/caregiver on patient safety   Based on caregiver fall risk screen, instruct family/caregiver to ask for assistance with transferring infant if caregiver noted to have fall risk factors  2/27/2025 1825 by Baylee Pollard, RN  Outcome: Progressing

## 2025-02-28 NOTE — PROGRESS NOTES
[place]2.0  [facility] [place]      Name:  Molly Latif /Age/Sex: 1933  (89 y.o. female)   MRN & CSN:  [id number] & [id number] Encounter Date/Time: 2025 2:09 PM EST    Location:  M8U-4612/4109-01 PCP: Elinor Hurst DO       Hospital Day: 6  Subjective:   Chief Complaint: Follow-up facial swelling    Seen and evaluated the bedside, doing really well, denies any complaint    Review of Systems:    Review of Systems    10 point ROS negative except as stated above in \"subjective\" section    Objective:     Intake/Output Summary (Last 24 hours) at 2025 1409  Last data filed at 2025 1007  Gross per 24 hour   Intake 538 ml   Output 2600 ml   Net -2062 ml      Vitals:   Vitals:    25 0755   BP: (!) 160/75   Pulse: 86   Resp: 16   Temp: 100.4 °F (38 °C)   SpO2: 92%     Physical Exam:   General: Awake, alert and oriented, NAD  Face: Significant improved swelling, able to open mouth better  Cardiovascular: S1S2 present, regular rate and rhythm, no murmurs  Respiratory: Clear to auscultation  Gastrointestinal: Soft, non tender, positive bowel sounds   Genitourinary: no suprapubic tenderness  Musculoskeletal: No edema    Medications:     Medications:    amoxicillin-clavulanate  1 tablet Oral 2 times per day    sodium chloride flush  5-40 mL IntraVENous 2 times per day    enoxaparin  40 mg SubCUTAneous Nightly    amLODIPine  5 mg Oral Nightly    triamterene-hydroCHLOROthiazide  1 tablet Oral Daily      Infusions:   PRN Meds: sodium chloride flush, 5-40 mL, PRN  potassium chloride, 40 mEq, PRN   Or  potassium alternative oral replacement, 40 mEq, PRN   Or  potassium chloride, 10 mEq, PRN  magnesium sulfate, 2,000 mg, PRN  ondansetron, 4 mg, Q8H PRN   Or  ondansetron, 4 mg, Q6H PRN  polyethylene glycol, 17 g, Daily PRN  acetaminophen, 650 mg, Q6H PRN   Or  acetaminophen, 650 mg, Q6H PRN        Labs      No results found for this or any previous visit (from the past 24 hour(s)).      Imaging/Diagnostics Last 24 Hours   CT SOFT TISSUE NECK W CONTRAST    Result Date: 2/23/2025  EXAMINATION: CT OF THE NECK SOFT TISSUE WITH CONTRAST  2/23/2025 TECHNIQUE: CT of the neck was performed with the administration of intravenous contrast. Multiplanar reformatted images are provided for review. Automated exposure control, iterative reconstruction, and/or weight based adjustment of the mA/kV was utilized to reduce the radiation dose to as low as reasonably achievable. COMPARISON: None. HISTORY: ORDERING SYSTEM PROVIDED HISTORY: dental abscess vs infected salivary gland, swelling ?mass of left cheek/jaw. hx dementia FINDINGS: PHARYNX/LARYNX:  The tonsillar pillars are normal in appearance.  The tongue is normal in appearance.  The valleculae, epiglottis, aryepiglottic folds and pyriform sinuses appear unremarkable.  The true and false vocal cords are normal in appearance.  Evidence of myositis of the left medial pterygoid, lateral pterygoid and overlying temporalis.  10 mm abscess involving the left medial pterygoid muscle adjacent to the inner cortex of the left mandible at the site of the mandibular foramen.  There is enlargement and likely intraglandular abscess of the minor salivary glands along the left floor of the mouth, limited evaluation due to extensive streak artifact from the dental amalgam. SALIVARY GLANDS/THYROID:  The left submandibular gland is inflamed.  The parotid and right submandibular glands appear unremarkable.  The thyroid gland appears unremarkable. LYMPH NODES:  No cervical or supraclavicular lymphadenopathy is seen. SOFT TISSUES:  Left lower face soft tissue swelling. BRAIN/ORBITS/SINUSES:  The visualized portion of the intracranial contents appear unremarkable.  The visualized portion of the orbits, paranasal sinuses and mastoid air cells demonstrate no acute abnormality. LUNG APICES/SUPERIOR MEDIASTINUM:  No focal consolidation is seen within the visualized lung apices.  No

## 2025-02-28 NOTE — DISCHARGE INSTR - COC
Continuity of Care Form    Patient Name: Molly Powell   :  1933  MRN:  [id number]    Admit date:  2025  Discharge date:  ***    Code Status Order: DNR-CCA   Advance Directives:   Advance Care Flowsheet Documentation             Admitting Physician:  No admitting provider for patient encounter.  PCP: Elinor Hurst DO    Discharging Nurse: ***  Discharging Hospital Unit/Room#: F0S-7466/4109-01  Discharging Unit Phone Number: ***    Emergency Contact:   Extended Emergency Contact Information  Primary Emergency Contact: Hansel Powell  Address: [address] [place] [address] [place] [address]  Home Phone: [phone]  Relation: Spouse  Secondary Emergency Contact: carmen powell  Home Phone: [phone]  Relation: Child    Past Surgical History:  Past Surgical History:   Procedure Laterality Date    CHOLECYSTECTOMY      HERNIA REPAIR      hiatal hernia    HYSTERECTOMY, TOTAL ABDOMINAL (CERVIX REMOVED)      complete    RECTAL PROLAPSE REPAIR N/A 2022    TRANSPERINEAL RECTOSIGMOIDECTOMY (ALTEMEIER PROCEDURE), FLEXIBLE SIGMOIDOSCOPY performed by Tre Wright MD at [facility] OR       Immunization History:   Immunization History   Administered Date(s) Administered    COVID-19, MODERNA BLUE border, Primary or Immunocompromised, (age 12y+), IM, 100 mcg/0.5mL 2021, 2021    COVID-19, MODERNA Bivalent, (age 12y+), IM, 50 mcg/0.5 mL 10/21/2022    COVID-19, MODERNA Booster BLUE border, (age 18y+), IM, 50mcg/0.25mL 2021    TDaP, ADACEL (age 10y-64y), BOOSTRIX (age 10y+), IM, 0.5mL 10/06/2023       Active Problems:  Patient Active Problem List   Diagnosis Code    Rectal prolapse K62.3    Acute metabolic encephalopathy G93.41    Sialoadenitis K11.20       Isolation/Infection:   Isolation            No Isolation          Patient Infection Status       None to display                     Nurse Assessment:  Last Vital Signs: BP (!) 160/75   Pulse 86   Temp 100.4 °F (38 °C) (Oral)   Resp 16   Ht 1.575 m  {Therapy; copd oxygen:71469}  Ventilator:    { CC Vent List:310060064}    Rehab Therapies: {THERAPEUTIC INTERVENTION:2362376171}  Weight Bearing Status/Restrictions: { CC Weight Bearin}  Other Medical Equipment (for information only, NOT a DME order):  {EQUIPMENT:543654995}  Other Treatments: ***    Patient's personal belongings (please select all that are sent with patient):  {CHP DME Belongings:835004715}    RN SIGNATURE:  {Esignature:619797638}    CASE MANAGEMENT/SOCIAL WORK SECTION    Inpatient Status Date: 2025    Readmission Risk Assessment Score:  [facility] RISK OF UNPLANNED READMISSION 2.0             10.5 Total Score        Discharging to Facility/ Agency   Name: [facility]  Address:  Phone: [phone]  Fax:[phone]        / signature: Electronically signed by VANDA Walker on 25 at 2:42 PM EST    PHYSICIAN SECTION    Prognosis: {Prognosis:5029573750}    Condition at Discharge: { Patient Condition:489408853}    Rehab Potential (if transferring to Rehab): {Prognosis:1521815563}    Recommended Labs or Other Treatments After Discharge: ***    Physician Certification: I certify the above information and transfer of Molly Latif  is necessary for the continuing treatment of the diagnosis listed and that she requires {Admit to Appropriate Level of Care:00150} for {GREATER/LESS:516867062} 30 days.     Update Admission H&P: {CHP DME Changes in HandP:998194509}    PHYSICIAN SIGNATURE:  {Esignature:270660870}

## 2025-03-01 VITALS
WEIGHT: 151.6 LBS | HEART RATE: 79 BPM | OXYGEN SATURATION: 92 % | BODY MASS INDEX: 27.9 KG/M2 | RESPIRATION RATE: 16 BRPM | HEIGHT: 62 IN | TEMPERATURE: 97.9 F | SYSTOLIC BLOOD PRESSURE: 117 MMHG | DIASTOLIC BLOOD PRESSURE: 78 MMHG

## 2025-03-01 PROCEDURE — 6370000000 HC RX 637 (ALT 250 FOR IP)

## 2025-03-01 PROCEDURE — 94760 N-INVAS EAR/PLS OXIMETRY 1: CPT

## 2025-03-01 PROCEDURE — 6370000000 HC RX 637 (ALT 250 FOR IP): Performed by: HOSPITALIST

## 2025-03-01 RX ADMIN — AMOXICILLIN AND CLAVULANATE POTASSIUM 1 TABLET: 875; 125 TABLET, FILM COATED ORAL at 09:33

## 2025-03-01 RX ADMIN — TRIAMTERENE AND HYDROCHLOROTHIAZIDE 1 TABLET: 25; 37.5 TABLET ORAL at 09:33

## 2025-03-01 NOTE — DISCHARGE SUMMARY
V2.0  Discharge Summary    Name:  Molly Latif /Age/Sex: 1933 (89 y.o. female)   Admit Date: 2025  Discharge Date: 3/1/25    MRN & CSN:  [id number] & [id number] Encounter Date and Time 3/1/25 11:22 AM EST    Attending:  Paco Lamas MD Discharging Provider: Paco Lamas MD     Discharge Diagnosis:     # Myositis, left-sided  muscles  # Sialadenitis  # Dementia  # Clinical dehydration  # Essential hypertension  # Constipation    Consultants:  IP CONSULT TO OTOLARYNGOLOGY  IP CONSULT TO HOSPITALIST    Brief HPI:    Per admitting H and P...\" Molly Latif is a 89 y.o. female with  has a past medical history of Arthritis, Forgetfulness, Hx of blood clots, Hyperlipidemia, Hypertension, and Wears glasses. who presented with chief complaints of left sided facial swelling. Patient has a history of dementia so history was obtained from son present at bedside. According to son patient has been having a lot of drooling recently and has noticed left sided facial swelling since Tuesday. Patient denies any localized pain but has generalized pain all over her body which is chronic. Denies any fever, chills, shortness of breath, nausea and vomiting \"      Brief hospital course:     Please refer to the admitting H and P for details surrounding the initial presentation.    Patient had a CT scan which revealed evidence of myositis of the left medial pterygoid, left lateral pterygoid and overlapping temporalis muscle there was concern for fluid collection, was seen by ENT, initially was kept n.p.o., was kept on IV fluids.  ENT did not feel the patient had any abscess and that the patient needed any intervention besides continued antibiotics and supportive care.  Patient was also noted to have submandibular sialoadenitis.  With IV antibiotics, patient started to improve.  Patient was able to open her mouth better at which point patient was started on diet, ultimately patient is back on a regular diet.

## 2025-03-01 NOTE — PLAN OF CARE
Problem: Discharge Planning  Goal: Discharge to home or other facility with appropriate resources  Outcome: Progressing     Problem: Skin/Tissue Integrity  Goal: Skin integrity remains intact  Description: 1.  Monitor for areas of redness and/or skin breakdown  2.  Assess vascular access sites hourly  3.  Every 4-6 hours minimum:  Change oxygen saturation probe site  4.  Every 4-6 hours:  If on nasal continuous positive airway pressure, respiratory therapy assess nares and determine need for appliance change or resting period  Outcome: Progressing  Flowsheets (Taken 2/28/2025 2340)  Skin Integrity Remains Intact:   Monitor for areas of redness and/or skin breakdown   Assess vascular access sites hourly     Problem: Safety - Adult  Goal: Free from fall injury  Outcome: Progressing     Problem: ABCDS Injury Assessment  Goal: Absence of physical injury  Outcome: Progressing     Problem: Pain  Goal: Verbalizes/displays adequate comfort level or baseline comfort level  Outcome: Progressing     Problem: Neurosensory - Adult  Goal: Achieves stable or improved neurological status  Outcome: Progressing  Goal: Achieves maximal functionality and self care  Outcome: Progressing     Problem: Respiratory - Adult  Goal: Achieves optimal ventilation and oxygenation  Outcome: Progressing     Problem: Skin/Tissue Integrity - Adult  Goal: Skin integrity remains intact  Description: 1.  Monitor for areas of redness and/or skin breakdown  2.  Assess vascular access sites hourly  3.  Every 4-6 hours minimum:  Change oxygen saturation probe site  4.  Every 4-6 hours:  If on nasal continuous positive airway pressure, respiratory therapy assess nares and determine need for appliance change or resting period  Outcome: Progressing  Flowsheets (Taken 2/28/2025 2472)  Skin Integrity Remains Intact:   Monitor for areas of redness and/or skin breakdown   Assess vascular access sites hourly  Goal: Oral mucous membranes remain intact  Outcome:  provide reality reorientation, refocusing and direction  4. Decrease environmental stimuli, including noise as appropriate  5. Monitor and intervene to maintain adequate nutrition, hydration, elimination, sleep and activity  6. If unable to ensure safety without constant attention obtain sitter and review sitter guidelines with assigned personnel  7. Initiate Psychosocial CNS and Spiritual Care consult, as indicated  Outcome: Progressing     Problem: Behavior  Goal: Pt/Family maintain appropriate behavior and adhere to behavioral management agreement, if implemented  Description: INTERVENTIONS:  1. Assess patient/family's coping skills and  non-compliant behavior (including use of illegal substances)  2. Notify security of behavior or suspected illegal substances which indicate the need for search of the family and/or belongings  3. Encourage verbalization of thoughts and concerns in a socially appropriate manner  4. Utilize positive, consistent limit setting strategies supporting safety of patient, staff and others  5. Encourage participation in the decision making process about the behavioral management agreement  6. If a visitor's behavior poses a threat to safety call refer to organization policy.  7. Initiate consult with , Psychosocial CNS, Spiritual Care as appropriate  Outcome: Progressing

## 2025-03-01 NOTE — CARE COORDINATION
DISCHARGE SUMMARY     DATE OF DISCHARGE: 3/1/2025    DISCHARGE DESTINATION: [facility]    HOME CARE: No  FACILITY    Level of Care: memory care    Report Number: [phone]    Fax Number:  [phone]    Precert Obtained: N/A    Hens Completed: N/A    PASARR: N/A    Notified: RN, Family, and Facility/Agency    TRANSPORTATION: [employer]    Company Name: [employer]     Time: 12:00PM    Phone Number: [phone]    Electronically signed by Sarah Valencia on 3/1/2025 at 11:06 AM  #[phone]

## (undated) DEVICE — DRAPE THYROID

## (undated) DEVICE — NEEDLE HYPO 25GA L1.5IN BVL ORIENTED ECLIPSE

## (undated) DEVICE — Z DISCONTINUED BY MEDLINE USE 2711682 TRAY SKIN PREP DRY W/ PREM GLV

## (undated) DEVICE — GLOVE SURG SZ 7 CRM LTX FREE POLYISOPRENE POLYMER BEAD ANTI

## (undated) DEVICE — MINOR SET UP: Brand: MEDLINE INDUSTRIES, INC.

## (undated) DEVICE — YANKAUER,BULB TIP,W/O VENT,RIGID,STERILE: Brand: MEDLINE

## (undated) DEVICE — SYRINGE IRRIG 60ML SFT PLIABLE BLB EZ TO GRP 1 HND USE W/

## (undated) DEVICE — SOLUTION IV IRRIG POUR BRL 0.9% SODIUM CHL 2F7124

## (undated) DEVICE — TUBING, SUCTION, 1/4" X 12', STRAIGHT: Brand: MEDLINE

## (undated) DEVICE — PAD SANITARY MTRN TAB BELT WRP NS 11IN

## (undated) DEVICE — DRAPE SURG UTIL 26X15 IN W/ TAPE N INVASIVE MULT LAYR DISP

## (undated) DEVICE — UNDERPAD INCONT XL MESH PROTCT + DISP FOR MAT USE

## (undated) DEVICE — GOWN SIRUS NONREIN XL W/TWL: Brand: MEDLINE INDUSTRIES, INC.

## (undated) DEVICE — LOOP,VESSEL,MAXI,BLUE,2/PK,STERILE: Brand: MEDLINE

## (undated) DEVICE — CLEANER,CAUTERY TIP,2X2",STERILE: Brand: MEDLINE

## (undated) DEVICE — PAD,NON-ADHERENT,3X8,STERILE,LF,1/PK: Brand: MEDLINE

## (undated) DEVICE — CANISTER, RIGID, 1200CC: Brand: MEDLINE INDUSTRIES, INC.

## (undated) DEVICE — CATHETER IV 14GA L3.25IN ORNG FLUORINATED ETHYLENE

## (undated) DEVICE — DRESSING,GAUZE,PETROLATUM,CURAD,3"X9",ST: Brand: CURAD

## (undated) DEVICE — STERILE SURGICAL LUBRICANT, METAL TUBE: Brand: SURGILUBE

## (undated) DEVICE — SOLUTION PREP POVIDONE IOD FOR SKIN MUCOUS MEM PRIOR TO